# Patient Record
Sex: FEMALE | Race: WHITE | NOT HISPANIC OR LATINO | Employment: OTHER | URBAN - NONMETROPOLITAN AREA
[De-identification: names, ages, dates, MRNs, and addresses within clinical notes are randomized per-mention and may not be internally consistent; named-entity substitution may affect disease eponyms.]

---

## 2017-11-06 ENCOUNTER — TRANSCRIBE ORDERS (OUTPATIENT)
Dept: ADMINISTRATIVE | Facility: HOSPITAL | Age: 77
End: 2017-11-06

## 2017-11-06 DIAGNOSIS — N64.4 BREAST PAIN: ICD-10-CM

## 2017-11-06 DIAGNOSIS — IMO0002 MASS: Primary | ICD-10-CM

## 2017-11-08 ENCOUNTER — HOSPITAL ENCOUNTER (OUTPATIENT)
Dept: MAMMOGRAPHY | Facility: HOSPITAL | Age: 77
Discharge: HOME | End: 2017-11-08
Attending: FAMILY MEDICINE | Admitting: FAMILY MEDICINE

## 2017-11-08 ENCOUNTER — HOSPITAL ENCOUNTER (OUTPATIENT)
Dept: ULTRASOUND IMAGING | Facility: HOSPITAL | Age: 77
Discharge: HOME | End: 2017-11-08
Attending: FAMILY MEDICINE

## 2017-11-08 DIAGNOSIS — N64.4 BREAST PAIN: ICD-10-CM

## 2017-11-08 DIAGNOSIS — IMO0002 MASS: ICD-10-CM

## 2017-11-08 PROCEDURE — G0279 TOMOSYNTHESIS, MAMMO: HCPCS

## 2017-11-08 PROCEDURE — 76642 ULTRASOUND BREAST LIMITED: CPT

## 2017-11-08 PROCEDURE — G0204 DX MAMMO INCL CAD BI: HCPCS

## 2017-11-20 ENCOUNTER — OFFICE VISIT (OUTPATIENT)
Dept: GASTROENTEROLOGY | Age: 77
End: 2017-11-20
Payer: MEDICARE

## 2017-11-20 VITALS
OXYGEN SATURATION: 97 % | SYSTOLIC BLOOD PRESSURE: 122 MMHG | BODY MASS INDEX: 29.09 KG/M2 | HEART RATE: 80 BPM | DIASTOLIC BLOOD PRESSURE: 80 MMHG | WEIGHT: 174.6 LBS | HEIGHT: 65 IN

## 2017-11-20 DIAGNOSIS — R10.13 MIDEPIGASTRIC PAIN: ICD-10-CM

## 2017-11-20 DIAGNOSIS — R11.2 NAUSEA AND VOMITING, INTRACTABILITY OF VOMITING NOT SPECIFIED, UNSPECIFIED VOMITING TYPE: ICD-10-CM

## 2017-11-20 DIAGNOSIS — R53.1 GENERALIZED WEAKNESS: ICD-10-CM

## 2017-11-20 DIAGNOSIS — Z87.11 HISTORY OF GASTRIC ULCER: ICD-10-CM

## 2017-11-20 DIAGNOSIS — Z87.19 HISTORY OF ESOPHAGEAL STRICTURE: ICD-10-CM

## 2017-11-20 DIAGNOSIS — R13.19 ESOPHAGEAL DYSPHAGIA: Primary | ICD-10-CM

## 2017-11-20 PROCEDURE — G8427 DOCREV CUR MEDS BY ELIG CLIN: HCPCS | Performed by: NURSE PRACTITIONER

## 2017-11-20 PROCEDURE — 99214 OFFICE O/P EST MOD 30 MIN: CPT | Performed by: NURSE PRACTITIONER

## 2017-11-20 PROCEDURE — 1036F TOBACCO NON-USER: CPT | Performed by: NURSE PRACTITIONER

## 2017-11-20 PROCEDURE — G8419 CALC BMI OUT NRM PARAM NOF/U: HCPCS | Performed by: NURSE PRACTITIONER

## 2017-11-20 PROCEDURE — 1123F ACP DISCUSS/DSCN MKR DOCD: CPT | Performed by: NURSE PRACTITIONER

## 2017-11-20 PROCEDURE — G8482 FLU IMMUNIZE ORDER/ADMIN: HCPCS | Performed by: NURSE PRACTITIONER

## 2017-11-20 PROCEDURE — 1090F PRES/ABSN URINE INCON ASSESS: CPT | Performed by: NURSE PRACTITIONER

## 2017-11-20 PROCEDURE — G8400 PT W/DXA NO RESULTS DOC: HCPCS | Performed by: NURSE PRACTITIONER

## 2017-11-20 PROCEDURE — 4040F PNEUMOC VAC/ADMIN/RCVD: CPT | Performed by: NURSE PRACTITIONER

## 2017-11-20 ASSESSMENT — ENCOUNTER SYMPTOMS
CONSTIPATION: 0
SORE THROAT: 0
ABDOMINAL PAIN: 1
RECTAL PAIN: 0
CHEST TIGHTNESS: 0
DIARRHEA: 0
NAUSEA: 1
COUGH: 0
VOMITING: 1
ABDOMINAL DISTENTION: 0
BLOOD IN STOOL: 0
BACK PAIN: 0
VOICE CHANGE: 0
TROUBLE SWALLOWING: 1
SHORTNESS OF BREATH: 0

## 2017-11-20 NOTE — PROGRESS NOTES
Subjective:      Patient ID: Mat Adam is a 69 y.o. female. HPI  Chief Complaint   Patient presents with    Gastroesophageal Reflux     referred by Dr. More Martínez    Nausea & Vomiting       Patient c/o nausea and vomiting for last 2 months. Occurs daily. Occurs after eating but not every meal. Associated with AUTUMN pain. She also c/o dysphagia. Onset march/april with progressive worsening. She has history of esophageal stricture previously dilated and history of gastric ulcers. She states she feels weak and tired all the time. She has rx for pantoprazole but takes this only prn. States she does not have a lot of heartburn. Family History   Problem Relation Age of Onset    Breast Cancer Mother     Colon Cancer Neg Hx     Colon Polyps Neg Hx     Esophageal Cancer Neg Hx     Liver Disease Neg Hx     Liver Cancer Neg Hx     Rectal Cancer Neg Hx     Stomach Cancer Neg Hx        Past Medical History:   Diagnosis Date    Arthritis     Fernandes's esophagus     Diabetes ([facility] [address]     Elevated lipids     Hiatal hernia     Osteoarthritis     other     Non Specified Ulcers    Skin cancer     History OF. Past Surgical History:   Procedure Laterality Date    BREAST SURGERY      BG Biopsy    CHOLECYSTECTOMY      COLONOSCOPY  3/16/2005    DR. OTERO    COLONOSCOPY  5/21/2010    tubular adenoma    COLONOSCOPY  12/31/2015    Dr Mcallister Expose, normal, 5 yr recall    FRACTURE SURGERY      x 2 right ankle    HAND SURGERY      HYSTERECTOMY, TOTAL ABDOMINAL      KNEE SURGERY      OTHER SURGICAL HISTORY      Basa Cell Lesion Removal    ROTATOR CUFF REPAIR      X2    UPPER GASTROINTESTINAL ENDOSCOPY  5/19/2005    DR. KIM    UPPER GASTROINTESTINAL ENDOSCOPY  10/22/2009    peptic stricture, schatzki ring dilated 46 fr, sm hiatal hernia, gastritis    UPPER GASTROINTESTINAL ENDOSCOPY  10/2014    empiric dilation, urease neg    UPPER GASTROINTESTINAL ENDOSCOPY N/A 12/18/2015     Analisa, stricture/ulcers, repeat 8 wks       Current Outpatient Prescriptions   Medication Sig Dispense Refill    celecoxib (CELEBREX) 200 MG capsule Take 200 mg by mouth daily.  levothyroxine (SYNTHROID) 75 MCG tablet Take 75 mcg by mouth Daily.  GlipiZIDE (GLUCOTROL PO) Take 10 mg by mouth daily.  MetFORMIN HCl (GLUCOPHAGE PO) Take 1,000 mg by mouth 2 times daily.  LISINOPRIL PO Take 10 mg by mouth daily. No current facility-administered medications for this visit. No Known Allergies     reports that she quit smoking about 28 years ago. She has never used smokeless tobacco. She reports that she drinks alcohol. She reports that she does not use drugs. Review of Systems   Constitutional: Positive for fatigue. Negative for appetite change, fever and unexpected weight change. HENT: Positive for trouble swallowing. Negative for sore throat and voice change. Respiratory: Negative for cough, chest tightness and shortness of breath. Cardiovascular: Negative for chest pain, palpitations and leg swelling. Gastrointestinal: Positive for abdominal pain, nausea and vomiting. Negative for abdominal distention, blood in stool, constipation, diarrhea and rectal pain. Food sticking   Musculoskeletal: Negative for arthralgias, back pain and gait problem. Skin: Negative for pallor, rash and wound. Neurological: Positive for weakness (generalized). Negative for dizziness and light-headedness. Hematological: Negative for adenopathy. Does not bruise/bleed easily. All other systems reviewed and are negative. Objective:   Physical Exam   Constitutional: She is oriented to person, place, and time. She appears well-developed and well-nourished. No distress. /80   Pulse 80   Ht 5' 4.5\" (1.638 m)   Wt 174 lb 9.6 oz (79.2 kg)   SpO2 97%   BMI 29.51 kg/m²      Eyes: Conjunctivae are normal. No scleral icterus. Neck: No tracheal deviation present. 40mg.

## 2017-12-01 ENCOUNTER — ANESTHESIA EVENT (OUTPATIENT)
Dept: OPERATING ROOM | Age: 77
End: 2017-12-01

## 2017-12-01 RX ORDER — SODIUM CHLORIDE, SODIUM LACTATE, POTASSIUM CHLORIDE, CALCIUM CHLORIDE 600; 310; 30; 20 MG/100ML; MG/100ML; MG/100ML; MG/100ML
INJECTION, SOLUTION INTRAVENOUS CONTINUOUS
Status: CANCELLED | OUTPATIENT
Start: 2017-12-01

## 2017-12-01 RX ORDER — LIDOCAINE HYDROCHLORIDE 10 MG/ML
1 INJECTION, SOLUTION EPIDURAL; INFILTRATION; INTRACAUDAL; PERINEURAL
Status: CANCELLED | OUTPATIENT
Start: 2017-12-01 | End: 2017-12-01

## 2017-12-06 ENCOUNTER — HOSPITAL ENCOUNTER (OUTPATIENT)
Age: 77
Setting detail: OUTPATIENT SURGERY
Discharge: HOME | End: 2017-12-06
Attending: INTERNAL MEDICINE | Admitting: INTERNAL MEDICINE
Payer: MEDICARE

## 2017-12-06 ENCOUNTER — HOSPITAL ENCOUNTER (OUTPATIENT)
Age: 77
Setting detail: SPECIMEN
Discharge: HOME | End: 2017-12-06
Payer: MEDICARE

## 2017-12-06 ENCOUNTER — ANESTHESIA (OUTPATIENT)
Dept: OPERATING ROOM | Age: 77
End: 2017-12-06

## 2017-12-06 VITALS
OXYGEN SATURATION: 99 % | TEMPERATURE: 98.6 F | DIASTOLIC BLOOD PRESSURE: 84 MMHG | WEIGHT: 171 LBS | RESPIRATION RATE: 18 BRPM | SYSTOLIC BLOOD PRESSURE: 130 MMHG | BODY MASS INDEX: 29.19 KG/M2 | HEART RATE: 76 BPM | HEIGHT: 64 IN

## 2017-12-06 VITALS — OXYGEN SATURATION: 98 % | SYSTOLIC BLOOD PRESSURE: 155 MMHG | DIASTOLIC BLOOD PRESSURE: 83 MMHG

## 2017-12-06 PROCEDURE — 87077 CULTURE AEROBIC IDENTIFY: CPT

## 2017-12-06 PROCEDURE — G8907 PT DOC NO EVENTS ON DISCHARG: HCPCS | Performed by: NURSE PRACTITIONER

## 2017-12-06 PROCEDURE — 43239 EGD BIOPSY SINGLE/MULTIPLE: CPT

## 2017-12-06 PROCEDURE — G8918 PT W/O PREOP ORDER IV AB PRO: HCPCS | Performed by: NURSE PRACTITIONER

## 2017-12-06 PROCEDURE — 43248 EGD GUIDE WIRE INSERTION: CPT

## 2017-12-06 PROCEDURE — 43248 EGD GUIDE WIRE INSERTION: CPT | Performed by: INTERNAL MEDICINE

## 2017-12-06 PROCEDURE — 43239 EGD BIOPSY SINGLE/MULTIPLE: CPT | Performed by: INTERNAL MEDICINE

## 2017-12-06 RX ORDER — LIDOCAINE HYDROCHLORIDE 10 MG/ML
INJECTION, SOLUTION EPIDURAL; INFILTRATION; INTRACAUDAL; PERINEURAL PRN
Status: DISCONTINUED | OUTPATIENT
Start: 2017-12-06 | End: 2017-12-06 | Stop reason: SDUPTHER

## 2017-12-06 RX ORDER — PROPOFOL 10 MG/ML
INJECTION, EMULSION INTRAVENOUS PRN
Status: DISCONTINUED | OUTPATIENT
Start: 2017-12-06 | End: 2017-12-06 | Stop reason: SDUPTHER

## 2017-12-06 RX ADMIN — PROPOFOL 150 MG: 10 INJECTION, EMULSION INTRAVENOUS at 11:57

## 2017-12-06 RX ADMIN — LIDOCAINE HYDROCHLORIDE 5 ML: 10 INJECTION, SOLUTION EPIDURAL; INFILTRATION; INTRACAUDAL; PERINEURAL at 11:57

## 2017-12-06 NOTE — H&P
[K21.9] 09/16/2014    History of esophageal stricture [Z87.19] 09/16/2014    Esophageal dysphagia [R13.10] 09/16/2014        All other pertinent GI symptoms negative. Physical Exam:  Cardiac:  [x]WNL  []Comments:  Pulmonary:  [x]WNL   []Comments:  Neuro/Mental Status:  [x]WNL  []Comments:  Abdominal:  [x]WNL    []Comments:  Other:   []WNL  []Comments:    Informed Consent:  The risks and benefits of the procedure have been discussed with either the patient or if they cannot consent, their representative. Assessment:  Patient examined and appropriate for planned sedation and procedure. Plan:  Proceed with planned sedation and procedure as above.     Dane Hauser,   11:33 AM  \

## 2017-12-06 NOTE — OP NOTE
will start her on bethanechol 25 mg po bid for her symptoms, then have her f/u in clinic in 4 weeks.     [facility],   12/06/17  12:05 PM

## 2017-12-06 NOTE — ANESTHESIA PRE PROCEDURE
Department of Anesthesiology  Preprocedure Note       Name:  Abigail Gallegos   Age:  69 y.o.  :  1940                                          MRN:  [id number]         Date:  2017      Surgeon: Jez Lou):  Gustavo Hauser DO    Procedure: Procedure(s):  EGD BIOPSY    Medications prior to admission:   Prior to Admission medications    Medication Sig Start Date End Date Taking? Authorizing Provider   celecoxib (CELEBREX) 200 MG capsule Take 200 mg by mouth daily. Historical Provider, MD   levothyroxine (SYNTHROID) 75 MCG tablet Take 75 mcg by mouth Daily. Historical Provider, MD   GlipiZIDE (GLUCOTROL PO) Take 10 mg by mouth daily. Historical Provider, MD   MetFORMIN HCl (GLUCOPHAGE PO) Take 1,000 mg by mouth 2 times daily. Historical Provider, MD   LISINOPRIL PO Take 10 mg by mouth daily. Historical Provider, MD       Current medications:    No current facility-administered medications for this encounter. Allergies:  No Known Allergies    Problem List:    Patient Active Problem List   Diagnosis Code    Esophageal dysphagia R13.10    GERD (gastroesophageal reflux disease) K21.9    History of esophageal stricture Z87.19    History of adenomatous polyp of colon Z86.010    Nausea and vomiting R11.2    Midepigastric pain R10.13    Generalized weakness R53.1    History of gastric ulcer Z87.19       Past Medical History:        Diagnosis Date    Arthritis     Fernandes's esophagus     Diabetes ([address]     Elevated lipids     Hiatal hernia     Osteoarthritis     other     Non Specified Ulcers    Skin cancer     History OF. Past Surgical History:        Procedure Laterality Date    BREAST SURGERY      BG Biopsy    CHOLECYSTECTOMY      COLONOSCOPY  3/16/2005    DR. OTERO    COLONOSCOPY  2010    tubular adenoma    COLONOSCOPY  2015    Dr Low Gravely, normal, 5 yr recall    FRACTURE SURGERY      x 2 right ankle    HAND SURGERY      HYSTERECTOMY, TOTAL ABDOMINAL  KNEE SURGERY      OTHER SURGICAL HISTORY      Basa Cell Lesion Removal    ROTATOR CUFF REPAIR      X2    UPPER GASTROINTESTINAL ENDOSCOPY  5/19/2005    DR. KIM    UPPER GASTROINTESTINAL ENDOSCOPY  10/22/2009    peptic stricture, schatzki ring dilated 46 fr, sm hiatal hernia, gastritis    UPPER GASTROINTESTINAL ENDOSCOPY  10/2014    empiric dilation, urease neg    UPPER GASTROINTESTINAL ENDOSCOPY N/A 12/18/2015    Dr Love Aly, stricture/ulcers, repeat 8 wks       Social History:    Social History   Substance Use Topics    Smoking status: Former Smoker     Quit date: 9/16/1989    Smokeless tobacco: Never Used    Alcohol use Yes      Comment: glass of wine a couple times per month                                Counseling given: Not Answered      Vital Signs (Current): There were no vitals filed for this visit.                                            BP Readings from Last 3 Encounters:   11/20/17 122/80   12/31/15 (!) 136/103   12/31/15 (!) 176/97       NPO Status:                                                                                 BMI:   Wt Readings from Last 3 Encounters:   11/20/17 174 lb 9.6 oz (79.2 kg)   12/31/15 178 lb (80.7 kg)   12/18/15 182 lb (82.6 kg)     There is no height or weight on file to calculate BMI.    CBC:   Lab Results   Component Value Date    WBC 7.33 02/25/2014    RBC 4.09 02/25/2014    HGB 10.9 02/25/2014    HCT 33.4 02/25/2014    MCV 81.7 02/25/2014    RDW 12.7 02/25/2014     02/25/2014       CMP:   Lab Results   Component Value Date     02/25/2014    K 4.2 02/25/2014     02/25/2014    CO2 22 02/25/2014    BUN 9 02/25/2014    CREATININE 1.2 12/03/2014    LABGLOM 65 02/25/2014    GLUCOSE 110 02/25/2014    PROT 6.0 02/25/2014    CALCIUM 8.8 02/25/2014    ALKPHOS 152 02/25/2014    AST 12 02/25/2014    ALT 25 02/25/2014       POC Tests: No results for input(s): POCGLU, POCNA, POCK, POCCL, POCBUN, POCHEMO, POCHCT in the last 72 hours.    Coags: No results found for: PROTIME, INR, APTT    HCG (If Applicable): No results found for: PREGTESTUR, PREGSERUM, HCG, HCGQUANT     ABGs: No results found for: PHART, PO2ART, FPZ3NJN, MLR5EIC, BEART, W1ALKHJW     Type & Screen (If Applicable):  No results found for: LABABO, LABRH    Anesthesia Evaluation   no history of anesthetic complications:   Airway: Mallampati: II  TM distance: >3 FB   Neck ROM: full  Mouth opening: > = 3 FB Dental: normal exam         Pulmonary:Negative Pulmonary ROS and normal exam                               Cardiovascular:Negative CV ROS                      Neuro/Psych:   Negative Neuro/Psych ROS              GI/Hepatic/Renal:   (+) hiatal hernia, GERD: poorly controlled,           Endo/Other:    (+) Type II DM, well controlled, , hypothyroidism::., .          Pt had no PAT visit       Abdominal:           Vascular: negative vascular ROS. Anesthesia Plan      general     ASA 3       Induction: intravenous. Anesthetic plan and risks discussed with patient.                       Ela Jackson CRNA   12/6/2017

## 2017-12-06 NOTE — ANESTHESIA POSTPROCEDURE EVALUATION
Department of Anesthesiology  Postprocedure Note    Patient: Denys Power  MRN: [id number]  YOB: 1940  Date of evaluation: 12/6/2017  Time:  12:06 PM     Procedure Summary     Date:  12/06/17 Room / Location:  [facility] ENDO 01 / [facility] OR    Anesthesia Start:  1156 Anesthesia Stop:      Procedures:       EGD BIOPSY (N/A )      EGD DILATION SAVORY Diagnosis:  (N/V - AUTUMN PAIN - DYSPHAGIA - HX ESOPH STRICTURE - HX GASTRIC ULCERS)    Surgeon:  Dinah Colindres DO Responsible Provider:  Conner Ramírez CRNA    Anesthesia Type:  general ASA Status:  3          Anesthesia Type: general    Alpesh Phase I:      Alpesh Phase II:      Last vitals: Reviewed and per EMR flowsheets.        Anesthesia Post Evaluation    Patient location during evaluation: bedside  Patient participation: complete - patient participated  Level of consciousness: sleepy but conscious  Pain score: 0  Airway patency: patent  Nausea & Vomiting: no nausea and no vomiting  Complications: no  Cardiovascular status: hemodynamically stable and blood pressure returned to baseline  Respiratory status: acceptable and nasal cannula  Hydration status: stable

## 2017-12-07 LAB — CLOTEST: NEGATIVE

## 2017-12-26 ENCOUNTER — OFFICE VISIT (OUTPATIENT)
Dept: GASTROENTEROLOGY | Age: 77
End: 2017-12-26
Payer: MEDICARE

## 2017-12-26 VITALS
SYSTOLIC BLOOD PRESSURE: 140 MMHG | DIASTOLIC BLOOD PRESSURE: 75 MMHG | BODY MASS INDEX: 30.35 KG/M2 | WEIGHT: 177.8 LBS | HEIGHT: 64 IN

## 2017-12-26 DIAGNOSIS — Z87.19 HISTORY OF ESOPHAGEAL STRICTURE: ICD-10-CM

## 2017-12-26 DIAGNOSIS — K21.9 CHRONIC GERD: Primary | ICD-10-CM

## 2017-12-26 DIAGNOSIS — Z87.19 S/P DILATATION OF ESOPHAGEAL STRICTURE: ICD-10-CM

## 2017-12-26 DIAGNOSIS — Z98.890 S/P DILATATION OF ESOPHAGEAL STRICTURE: ICD-10-CM

## 2017-12-26 PROCEDURE — G8427 DOCREV CUR MEDS BY ELIG CLIN: HCPCS | Performed by: NURSE PRACTITIONER

## 2017-12-26 PROCEDURE — G8417 CALC BMI ABV UP PARAM F/U: HCPCS | Performed by: NURSE PRACTITIONER

## 2017-12-26 PROCEDURE — 1123F ACP DISCUSS/DSCN MKR DOCD: CPT | Performed by: NURSE PRACTITIONER

## 2017-12-26 PROCEDURE — G8482 FLU IMMUNIZE ORDER/ADMIN: HCPCS | Performed by: NURSE PRACTITIONER

## 2017-12-26 PROCEDURE — 1090F PRES/ABSN URINE INCON ASSESS: CPT | Performed by: NURSE PRACTITIONER

## 2017-12-26 PROCEDURE — 99213 OFFICE O/P EST LOW 20 MIN: CPT | Performed by: NURSE PRACTITIONER

## 2017-12-26 PROCEDURE — 1036F TOBACCO NON-USER: CPT | Performed by: NURSE PRACTITIONER

## 2017-12-26 PROCEDURE — G8400 PT W/DXA NO RESULTS DOC: HCPCS | Performed by: NURSE PRACTITIONER

## 2017-12-26 PROCEDURE — 4040F PNEUMOC VAC/ADMIN/RCVD: CPT | Performed by: NURSE PRACTITIONER

## 2017-12-26 ASSESSMENT — ENCOUNTER SYMPTOMS
CONSTIPATION: 0
VOICE CHANGE: 0
ABDOMINAL PAIN: 0
CHOKING: 0
NAUSEA: 0
SHORTNESS OF BREATH: 0
TROUBLE SWALLOWING: 0
BLOOD IN STOOL: 0
COUGH: 0
ABDOMINAL DISTENTION: 0
RECTAL PAIN: 0
DIARRHEA: 0
VOMITING: 0

## 2017-12-26 NOTE — PROGRESS NOTES
Subjective:      Patient ID: Omid Rodriguez is a 69 y.o. female. PCP: Avery Stevenson DO     HPI  Chief Complaint   Patient presents with    Follow-up     4 week from endoscopy    Gastroesophageal Reflux       Patient seen in f/u from recent egd with dilation of esophageal stricture with 51 fr dilator and gastric biopsies. Biopsy neg h-pylori. She is swallowing better, no recent problems. She states she had sore throat after procedure lasting about 3 days. Muscles in neck felt tender and sore. She did not get rx for bethanechol. Denies any problem with nausea currently. Past Medical History:   Diagnosis Date    Arthritis     Fernandes's esophagus     Diabetes (HCC)     Elevated lipids     GERD (gastroesophageal reflux disease)     Hiatal hernia     Osteoarthritis     other     Non Specified Ulcers    Skin cancer     History OF. Past Surgical History:   Procedure Laterality Date    BREAST SURGERY      BG Biopsy    CHOLECYSTECTOMY      COLONOSCOPY  3/16/2005    DR. OTERO    COLONOSCOPY  5/21/2010    tubular adenoma    COLONOSCOPY  12/31/2015    Dr Anuradha Patterson, normal, 5 yr recall    FRACTURE SURGERY      x 2 right ankle    HAND SURGERY      HYSTERECTOMY, TOTAL ABDOMINAL      KNEE SURGERY      OTHER SURGICAL HISTORY      Basa Cell Lesion Removal    [place] EGD TRANSORAL BIOPSY SINGLE/MULTIPLE N/A 12/6/2017    EGD BIOPSY performed by Elicia Hauser DO at [address] GASTROINTESTINAL ENDOSCOPY  5/19/2005    DR. KIM    UPPER GASTROINTESTINAL ENDOSCOPY  10/22/2009    peptic stricture, schatzki ring dilated 46 fr, sm hiatal hernia, gastritis    UPPER GASTROINTESTINAL ENDOSCOPY  10/2014    empiric dilation, urease neg    UPPER GASTROINTESTINAL ENDOSCOPY N/A 12/18/2015    Dr Anuradha Patterson, stricture/ulcers, repeat 8 wks    UPPER GASTROINTESTINAL ENDOSCOPY  12/06/2017    Dr Hauser-w/dilation over wire, 46 [place]-hiatal hernia, distal

## 2018-05-09 ENCOUNTER — HOSPITAL ENCOUNTER (OUTPATIENT)
Dept: NUCLEAR MEDICINE | Age: 78
Discharge: HOME | End: 2018-05-11
Payer: MEDICARE

## 2018-05-09 DIAGNOSIS — R11.2 NAUSEA AND VOMITING, INTRACTABILITY OF VOMITING NOT SPECIFIED, UNSPECIFIED VOMITING TYPE: ICD-10-CM

## 2018-05-09 PROCEDURE — 78264 GASTRIC EMPTYING IMG STUDY: CPT

## 2018-05-09 PROCEDURE — 3430000000 HC RX DIAGNOSTIC RADIOPHARMACEUTICAL: Performed by: FAMILY MEDICINE

## 2018-05-09 PROCEDURE — A9541 TC99M SULFUR COLLOID: HCPCS | Performed by: FAMILY MEDICINE

## 2018-05-09 RX ADMIN — Medication 2 MILLICURIE: at 09:16

## 2018-05-21 ENCOUNTER — TRANSCRIBE ORDERS (OUTPATIENT)
Dept: CARDIOLOGY | Facility: HOSPITAL | Age: 78
End: 2018-05-21

## 2018-05-21 ENCOUNTER — HOSPITAL ENCOUNTER (OUTPATIENT)
Dept: CARDIOLOGY | Facility: HOSPITAL | Age: 78
Discharge: HOME | End: 2018-05-21
Admitting: PHYSICIAN ASSISTANT

## 2018-05-21 DIAGNOSIS — R53.83 OTHER FATIGUE: ICD-10-CM

## 2018-05-21 DIAGNOSIS — R11.2 NAUSEA AND VOMITING, INTRACTABILITY OF VOMITING NOT SPECIFIED, UNSPECIFIED VOMITING TYPE: Primary | ICD-10-CM

## 2018-05-21 DIAGNOSIS — E11.65 TYPE 2 DIABETES MELLITUS WITH HYPERGLYCEMIA, UNSPECIFIED LONG TERM INSULIN USE STATUS: ICD-10-CM

## 2018-05-21 DIAGNOSIS — E03.9 HYPOTHYROIDISM, UNSPECIFIED TYPE: ICD-10-CM

## 2018-05-21 DIAGNOSIS — R10.10 UPPER ABDOMINAL PAIN: ICD-10-CM

## 2018-05-21 PROCEDURE — 93005 ELECTROCARDIOGRAM TRACING: CPT

## 2018-05-21 PROCEDURE — 93010 ELECTROCARDIOGRAM REPORT: CPT | Performed by: INTERNAL MEDICINE

## 2018-05-22 ENCOUNTER — TRANSCRIBE ORDERS (OUTPATIENT)
Dept: ADMINISTRATIVE | Facility: HOSPITAL | Age: 78
End: 2018-05-22

## 2018-05-22 DIAGNOSIS — K85.90 PANCREATITIS, UNSPECIFIED PANCREATITIS TYPE: Primary | ICD-10-CM

## 2018-05-22 DIAGNOSIS — R11.2 NAUSEA AND VOMITING, INTRACTABILITY OF VOMITING NOT SPECIFIED, UNSPECIFIED VOMITING TYPE: ICD-10-CM

## 2018-05-23 ENCOUNTER — HOSPITAL ENCOUNTER (OUTPATIENT)
Dept: CT IMAGING | Facility: HOSPITAL | Age: 78
Discharge: HOME | End: 2018-05-23
Attending: FAMILY MEDICINE | Admitting: FAMILY MEDICINE

## 2018-05-23 DIAGNOSIS — K85.90 PANCREATITIS, UNSPECIFIED PANCREATITIS TYPE: ICD-10-CM

## 2018-05-23 DIAGNOSIS — R11.2 NAUSEA AND VOMITING, INTRACTABILITY OF VOMITING NOT SPECIFIED, UNSPECIFIED VOMITING TYPE: ICD-10-CM

## 2018-05-23 LAB — CREAT BLDA-MCNC: 1.2 MG/DL (ref 0.6–1.3)

## 2018-05-23 PROCEDURE — 74177 CT ABD & PELVIS W/CONTRAST: CPT

## 2018-05-23 PROCEDURE — 0 IOPAMIDOL PER 1 ML: Performed by: FAMILY MEDICINE

## 2018-05-23 PROCEDURE — 82565 ASSAY OF CREATININE: CPT

## 2018-05-23 RX ADMIN — IOPAMIDOL 100 ML: 755 INJECTION, SOLUTION INTRAVENOUS at 08:00

## 2018-06-14 RX ORDER — BUPROPION HYDROCHLORIDE 150 MG/1
150 TABLET ORAL EVERY MORNING
COMMUNITY
End: 2021-12-10

## 2018-06-14 RX ORDER — ROSUVASTATIN CALCIUM 20 MG/1
20 TABLET, COATED ORAL DAILY
Status: ON HOLD | COMMUNITY
End: 2021-07-25

## 2018-07-02 ENCOUNTER — OFFICE VISIT (OUTPATIENT)
Dept: GASTROENTEROLOGY | Age: 78
End: 2018-07-02
Payer: MEDICARE

## 2018-07-02 VITALS
HEIGHT: 65 IN | DIASTOLIC BLOOD PRESSURE: 80 MMHG | WEIGHT: 167.4 LBS | BODY MASS INDEX: 27.89 KG/M2 | HEART RATE: 73 BPM | SYSTOLIC BLOOD PRESSURE: 130 MMHG | OXYGEN SATURATION: 96 %

## 2018-07-02 DIAGNOSIS — R10.13 EPIGASTRIC ABDOMINAL PAIN: Primary | ICD-10-CM

## 2018-07-02 DIAGNOSIS — R11.2 NAUSEA AND VOMITING, INTRACTABILITY OF VOMITING NOT SPECIFIED, UNSPECIFIED VOMITING TYPE: ICD-10-CM

## 2018-07-02 DIAGNOSIS — R74.8 ABNORMAL SERUM LEVEL OF LIPASE: ICD-10-CM

## 2018-07-02 DIAGNOSIS — R10.13 EPIGASTRIC ABDOMINAL PAIN: ICD-10-CM

## 2018-07-02 LAB
ALBUMIN SERPL-MCNC: 4.5 G/DL (ref 3.5–5.2)
ALP BLD-CCNC: 97 U/L (ref 35–104)
ALT SERPL-CCNC: 12 U/L (ref 5–33)
ANION GAP SERPL CALCULATED.3IONS-SCNC: 16 MMOL/L (ref 7–19)
AST SERPL-CCNC: 14 U/L (ref 5–32)
BILIRUB SERPL-MCNC: 0.9 MG/DL (ref 0.2–1.2)
BUN BLDV-MCNC: 16 MG/DL (ref 8–23)
CALCIUM SERPL-MCNC: 9.8 MG/DL (ref 8.8–10.2)
CHLORIDE BLD-SCNC: 99 MMOL/L (ref 98–111)
CO2: 23 MMOL/L (ref 22–29)
CREAT SERPL-MCNC: 1.2 MG/DL (ref 0.5–0.9)
GFR NON-AFRICAN AMERICAN: 44
GLUCOSE BLD-MCNC: 130 MG/DL (ref 74–109)
LIPASE: 73 U/L (ref 13–60)
POTASSIUM SERPL-SCNC: 4.4 MMOL/L (ref 3.5–5)
SODIUM BLD-SCNC: 138 MMOL/L (ref 136–145)
TOTAL PROTEIN: 7.3 G/DL (ref 6.6–8.7)

## 2018-07-02 PROCEDURE — 99205 OFFICE O/P NEW HI 60 MIN: CPT | Performed by: INTERNAL MEDICINE

## 2018-07-02 PROCEDURE — 1036F TOBACCO NON-USER: CPT | Performed by: INTERNAL MEDICINE

## 2018-07-02 PROCEDURE — 4040F PNEUMOC VAC/ADMIN/RCVD: CPT | Performed by: INTERNAL MEDICINE

## 2018-07-02 PROCEDURE — 1123F ACP DISCUSS/DSCN MKR DOCD: CPT | Performed by: INTERNAL MEDICINE

## 2018-07-02 PROCEDURE — 1101F PT FALLS ASSESS-DOCD LE1/YR: CPT | Performed by: INTERNAL MEDICINE

## 2018-07-02 PROCEDURE — G8400 PT W/DXA NO RESULTS DOC: HCPCS | Performed by: INTERNAL MEDICINE

## 2018-07-02 PROCEDURE — 1090F PRES/ABSN URINE INCON ASSESS: CPT | Performed by: INTERNAL MEDICINE

## 2018-07-02 PROCEDURE — G8417 CALC BMI ABV UP PARAM F/U: HCPCS | Performed by: INTERNAL MEDICINE

## 2018-07-02 PROCEDURE — G8427 DOCREV CUR MEDS BY ELIG CLIN: HCPCS | Performed by: INTERNAL MEDICINE

## 2018-07-02 RX ORDER — CELECOXIB 200 MG/1
200 CAPSULE ORAL DAILY
Status: ON HOLD | COMMUNITY
End: 2018-08-15 | Stop reason: HOSPADM

## 2018-07-02 NOTE — PROGRESS NOTES
Fidenciodara Navarro  Primary Care Provider Danae Laguna DO  Referral Source Omid Vinson,*    Dorina Navarro is a 69 y.o. female  Chief Complaint:  Chief Complaint   Patient presents with    Nausea & Vomiting    Discuss Labs       Assessment / Plan:   Diagnosis Orders   1. Epigastric abdominal pain     2. Nausea and vomiting, intractability of vomiting not specified, unspecified vomiting type     3. Abnormal serum level of lipase       Given constellation of abdominal complaints, biliary dilatation, and questionable history of \"sand in her gallbladder\" I discussed the idea of an MRI MRCP versus EUS. This in combination with the findings of questionable thickening on her esophagus we decided to pursue an EGD with endoscopic ultrasound.     -Schedule EGD with EUS  - Recheck CMP, Lipase today    I have discussed the benefits, alternatives, and risks (including bleeding, perforation and death)  for pursuing Endoscopy (EGD/Colonscopy/EUS/ERCP) with the patient and they are willing to continue. We also discussed the need for anesthesia, IV access, proper dietary changes, medication changes if necessary, and need for bowel prep (if ordered) prior to their Endoscopic procedure. They are aware they must have someone accompany them to their scheduled procedure to drive them home - they agree to the above and are willing to continue. HPI    Very pleasant 76-year-old female referred by Dr. Rubina Torres with recurrent nausea vomiting elevated lipase. There is evidence of an outpatient CT scan from May shows some question of esophageal wall thickening and a small hiatal hernia. There is a mild dilation of common bile duct. Her pancreas is unremarkable    She has some continued nausea and vomiting. This is nonbloody. She's never been jaundice. Her weight is stable. She states her nausea vomiting has been present since October. She does go to admission [place] for the winter.  She December type pain there for 3 days per week. Occasional feelings of food in stomach acid refluxing into her esophagus. This is worse with certain foods. She states her gallbladder was removed \"a long time ago\" due to recurrent abdominal attacks. She said she had \"sand filling the gallbladder\" this was an open cholecystectomy by Dr. Weir Prim note she had a mild elevation in her lipase to 707. Her amylase was slightly elevated 190. Radiology Review:  CT of the abdomen with contrast from May 2018 shows a common bile duct and outpatient likely from previous cholecystectomy. Is a questionable distal esophageal thickening. Pancreas appears normal. This report personally reviewed by me in her care everywhere    Labwork available on referral cleanse a normal CBC and CMP. Specifically her LFTs are all normal. Lipase and amylase were elevated as described above this was in May 2018    No personal or family history of hepatobiliary disease  She does occasionally drink wine. Denies tobacco use     Past Medical History:   Diagnosis Date    Arthritis     Fernandes's esophagus     DDD (degenerative disc disease), lumbar     DM (diabetes mellitus), type 2 ([address]     Duodenal ulcer     E. coli sepsis ([facility] [address] 02/2014    Elevated lipids     Elevated pancreatic enzyme     Gastritis     GERD (gastroesophageal reflux disease)     Hiatal hernia     History of colon polyps     HTN (hypertension)     Hypercholesterolemia     Hypothyroidism     Nausea and vomiting     Osteoarthritis     other     Non Specified Ulcers    Pernicious anemia     Skin cancer     History OF.  Venous angioma     left-parietal      Past Surgical History:   Procedure Laterality Date    BREAST SURGERY      BG Biopsy    CHOLECYSTECTOMY      COLONOSCOPY  3/16/2005    DR. OTERO    COLONOSCOPY  5/21/2010    tubular adenoma    COLONOSCOPY  12/31/2015    Dr Sandro Ponce, normal, 5 yr recall    FRACTURE SURGERY      x 2 right ankle    HAND SURGERY Right 07/2012    Dr Leidy Urbina removed due to arthritis and tendon repair    HYSTERECTOMY, TOTAL ABDOMINAL  1969    KNEE SURGERY Right     LUMBAR [address] SURGERY  11/2008    L5-S1-Dr Demetrice Centeno LUMBAR SPINE SURGERY  2005    OTHER SURGICAL HISTORY Right     Basa Cell Lesion Removal-elbow    OTHER SURGICAL HISTORY Right 2002    ORIF ankle    [place] EGD TRANSORAL BIOPSY SINGLE/MULTIPLE N/A 12/6/2017    EGD BIOPSY performed by Lynette Stanford DO at [facility] Right     ROTATOR CUFF REPAIR Left 12/2013    Dr Galeana Bras  5/19/2005    DR. KIM    UPPER GASTROINTESTINAL ENDOSCOPY  10/22/2009    peptic stricture, schatzki ring dilated 46 fr, sm hiatal hernia, gastritis    UPPER GASTROINTESTINAL ENDOSCOPY  10/01/2014    Dr Hauser-empiric dilation, urease neg    UPPER GASTROINTESTINAL ENDOSCOPY N/A 12/18/2015    Dr India Weber, stricture/ulcers, repeat 8 wks    UPPER GASTROINTESTINAL ENDOSCOPY  12/06/2017    Dr Hauser-w/dilation over wire, 46 [place]-hiatal hernia, distal esophageal stricture    UPPER GASTROINTESTINAL ENDOSCOPY  12/6/2017    EGD DILATION SAVORY performed by Lynette Stanford DO at [facility] OR      Family History   Problem Relation Age of Onset    Breast Cancer Mother     Colon Cancer Neg Hx     Colon Polyps Neg Hx     Esophageal Cancer Neg Hx     Liver Disease Neg Hx     Liver Cancer Neg Hx     Rectal Cancer Neg Hx     Stomach Cancer Neg Hx       Current Outpatient Prescriptions   Medication Sig Dispense Refill    celecoxib (CELEBREX) 200 MG capsule Take 200 mg by mouth daily      rosuvastatin (CRESTOR) 20 MG tablet Take 20 mg by mouth daily      buPROPion (WELLBUTRIN XL) 150 MG extended release tablet Take 150 mg by mouth every morning      Pantoprazole Sodium (PROTONIX PO) Take 40 mg by mouth daily       levothyroxine (SYNTHROID) 75 MCG tablet Take 75 mcg by mouth Daily.  GlipiZIDE (GLUCOTROL PO) Take 10 mg by mouth daily. No tracheal deviation present. Cardiovascular: Normal rate, regular rhythm, normal heart sounds and intact distal pulses. Exam reveals no gallop and no friction rub. No murmur heard. Pulmonary/Chest: Effort normal and breath sounds normal. No accessory muscle usage. No respiratory distress. She exhibits no tenderness and no deformity. Abdominal: Soft. Normal appearance and bowel sounds are normal. She exhibits no distension and no mass. There is no hepatomegaly. There is no tenderness. There is no rebound and no guarding. Musculoskeletal: Normal range of motion. She exhibits no edema. Right ankle: She exhibits no swelling. Left ankle: She exhibits no swelling. Lymphadenopathy:     She has no cervical adenopathy. Right: No supraclavicular adenopathy present. Left: No supraclavicular adenopathy present. Neurological: She is alert and oriented to person, place, and time. She has normal strength. Gait normal.   Skin: Skin is warm, dry and intact. No bruising, no lesion and no rash noted. No cyanosis. No pallor. Nails show no clubbing. Psychiatric: She has a normal mood and affect. Her speech is normal and behavior is normal. Cognition and memory are normal.       Labs:    Multiple Labs reviewed under Care Everywhere and in referral packet from Dr Saldivar & Jo-Ann office. Pertinent findings listed above in HPI    No visits with results within 1 Month(s) from this visit.    Latest known visit with results is:   Hospital Outpatient Visit on 12/06/2017   Component Date Value Ref Range Status    Clotest 12/06/2017 Negative  Negative Final

## 2018-08-15 ENCOUNTER — ANESTHESIA EVENT (OUTPATIENT)
Dept: ENDOSCOPY | Age: 78
End: 2018-08-15
Payer: MEDICARE

## 2018-08-15 ENCOUNTER — ANESTHESIA (OUTPATIENT)
Dept: ENDOSCOPY | Age: 78
End: 2018-08-15
Payer: MEDICARE

## 2018-08-15 ENCOUNTER — HOSPITAL ENCOUNTER (OUTPATIENT)
Age: 78
Setting detail: OUTPATIENT SURGERY
Discharge: HOME | End: 2018-08-15
Attending: INTERNAL MEDICINE | Admitting: INTERNAL MEDICINE
Payer: MEDICARE

## 2018-08-15 VITALS
SYSTOLIC BLOOD PRESSURE: 129 MMHG | DIASTOLIC BLOOD PRESSURE: 65 MMHG | HEIGHT: 65 IN | HEART RATE: 76 BPM | BODY MASS INDEX: 27.16 KG/M2 | RESPIRATION RATE: 14 BRPM | TEMPERATURE: 97.3 F | OXYGEN SATURATION: 99 % | WEIGHT: 163 LBS

## 2018-08-15 VITALS
OXYGEN SATURATION: 93 % | DIASTOLIC BLOOD PRESSURE: 79 MMHG | SYSTOLIC BLOOD PRESSURE: 116 MMHG | RESPIRATION RATE: 14 BRPM

## 2018-08-15 LAB
GLUCOSE BLD-MCNC: 160 MG/DL (ref 70–99)
PERFORMED ON: ABNORMAL

## 2018-08-15 PROCEDURE — 43259 EGD US EXAM DUODENUM/JEJUNUM: CPT | Performed by: INTERNAL MEDICINE

## 2018-08-15 PROCEDURE — 6360000002 HC RX W HCPCS: Performed by: NURSE ANESTHETIST, CERTIFIED REGISTERED

## 2018-08-15 PROCEDURE — 2709999900 HC NON-CHARGEABLE SUPPLY: Performed by: INTERNAL MEDICINE

## 2018-08-15 PROCEDURE — 2580000003 HC RX 258: Performed by: INTERNAL MEDICINE

## 2018-08-15 PROCEDURE — 3609018500 HC EGD US SCOPE W/ADJACENT STRUCTURES: Performed by: INTERNAL MEDICINE

## 2018-08-15 PROCEDURE — 82948 REAGENT STRIP/BLOOD GLUCOSE: CPT

## 2018-08-15 PROCEDURE — C1769 GUIDE WIRE: HCPCS | Performed by: INTERNAL MEDICINE

## 2018-08-15 PROCEDURE — 3609012400 HC EGD TRANSORAL BIOPSY SINGLE/MULTIPLE: Performed by: INTERNAL MEDICINE

## 2018-08-15 PROCEDURE — 43239 EGD BIOPSY SINGLE/MULTIPLE: CPT | Performed by: INTERNAL MEDICINE

## 2018-08-15 PROCEDURE — 7100000010 HC PHASE II RECOVERY - FIRST 15 MIN: Performed by: INTERNAL MEDICINE

## 2018-08-15 PROCEDURE — 3609012700 HC EGD DILATION SAVORY: Performed by: INTERNAL MEDICINE

## 2018-08-15 PROCEDURE — 2500000003 HC RX 250 WO HCPCS: Performed by: INTERNAL MEDICINE

## 2018-08-15 PROCEDURE — 7100000011 HC PHASE II RECOVERY - ADDTL 15 MIN: Performed by: INTERNAL MEDICINE

## 2018-08-15 PROCEDURE — 43248 EGD GUIDE WIRE INSERTION: CPT | Performed by: INTERNAL MEDICINE

## 2018-08-15 PROCEDURE — 2500000003 HC RX 250 WO HCPCS: Performed by: NURSE ANESTHETIST, CERTIFIED REGISTERED

## 2018-08-15 PROCEDURE — 3700000000 HC ANESTHESIA ATTENDED CARE: Performed by: INTERNAL MEDICINE

## 2018-08-15 PROCEDURE — 88312 SPECIAL STAINS GROUP 1: CPT

## 2018-08-15 PROCEDURE — 88305 TISSUE EXAM BY PATHOLOGIST: CPT

## 2018-08-15 PROCEDURE — 3700000001 HC ADD 15 MINUTES (ANESTHESIA): Performed by: INTERNAL MEDICINE

## 2018-08-15 RX ORDER — PROPOFOL 10 MG/ML
INJECTION, EMULSION INTRAVENOUS CONTINUOUS PRN
Status: DISCONTINUED | OUTPATIENT
Start: 2018-08-15 | End: 2018-08-15 | Stop reason: SDUPTHER

## 2018-08-15 RX ORDER — LIDOCAINE HYDROCHLORIDE 20 MG/ML
INJECTION, SOLUTION INFILTRATION; PERINEURAL PRN
Status: DISCONTINUED | OUTPATIENT
Start: 2018-08-15 | End: 2018-08-15 | Stop reason: SDUPTHER

## 2018-08-15 RX ORDER — FENTANYL CITRATE 50 UG/ML
INJECTION, SOLUTION INTRAMUSCULAR; INTRAVENOUS PRN
Status: DISCONTINUED | OUTPATIENT
Start: 2018-08-15 | End: 2018-08-15 | Stop reason: SDUPTHER

## 2018-08-15 RX ORDER — SUCRALFATE 1 G/1
1 TABLET ORAL 4 TIMES DAILY
Qty: 120 TABLET | Refills: 3 | Status: ON HOLD | OUTPATIENT
Start: 2018-08-15 | End: 2021-07-25

## 2018-08-15 RX ORDER — SODIUM CHLORIDE, SODIUM LACTATE, POTASSIUM CHLORIDE, CALCIUM CHLORIDE 600; 310; 30; 20 MG/100ML; MG/100ML; MG/100ML; MG/100ML
INJECTION, SOLUTION INTRAVENOUS CONTINUOUS
Status: DISCONTINUED | OUTPATIENT
Start: 2018-08-15 | End: 2018-08-15 | Stop reason: HOSPADM

## 2018-08-15 RX ORDER — PANTOPRAZOLE SODIUM 40 MG/1
40 TABLET, DELAYED RELEASE ORAL
Qty: 30 TABLET | Refills: 3 | Status: ON HOLD | OUTPATIENT
Start: 2018-08-15 | End: 2021-07-25

## 2018-08-15 RX ORDER — LIDOCAINE HYDROCHLORIDE 10 MG/ML
1 INJECTION, SOLUTION EPIDURAL; INFILTRATION; INTRACAUDAL; PERINEURAL ONCE
Status: COMPLETED | OUTPATIENT
Start: 2018-08-15 | End: 2018-08-15

## 2018-08-15 RX ORDER — SUCRALFATE ORAL 1 G/10ML
1 SUSPENSION ORAL 4 TIMES DAILY
Qty: 1200 ML | Refills: 3 | Status: SHIPPED | OUTPATIENT
Start: 2018-08-15 | End: 2018-08-15

## 2018-08-15 RX ORDER — MIDAZOLAM HYDROCHLORIDE 1 MG/ML
INJECTION INTRAMUSCULAR; INTRAVENOUS PRN
Status: DISCONTINUED | OUTPATIENT
Start: 2018-08-15 | End: 2018-08-15 | Stop reason: SDUPTHER

## 2018-08-15 RX ADMIN — PROPOFOL 100 MCG/KG/MIN: 10 INJECTION, EMULSION INTRAVENOUS at 13:24

## 2018-08-15 RX ADMIN — FENTANYL CITRATE 50 MCG: 50 INJECTION INTRAMUSCULAR; INTRAVENOUS at 13:24

## 2018-08-15 RX ADMIN — SODIUM CHLORIDE, POTASSIUM CHLORIDE, SODIUM LACTATE AND CALCIUM CHLORIDE: 600; 310; 30; 20 INJECTION, SOLUTION INTRAVENOUS at 11:24

## 2018-08-15 RX ADMIN — MIDAZOLAM 2 MG: 1 INJECTION INTRAMUSCULAR; INTRAVENOUS at 13:22

## 2018-08-15 RX ADMIN — LIDOCAINE HYDROCHLORIDE 40 MG: 20 INJECTION, SOLUTION INFILTRATION; PERINEURAL at 13:24

## 2018-08-15 RX ADMIN — LIDOCAINE HYDROCHLORIDE 1 ML: 10 INJECTION, SOLUTION EPIDURAL; INFILTRATION; INTRACAUDAL; PERINEURAL at 11:24

## 2018-08-15 NOTE — H&P
Patient Name: Lena Joiner  : 1940  MRN: [id number]  DATE: 08/15/18    Allergies: Allergies   Allergen Reactions    Januvia [Sitagliptin] Nausea And Vomiting        ENDOSCOPY  History and Physical    Procedure:    [] Diagnostic Colonoscopy       [] Screening Colonoscopy  [x] EGD      [] ERCP      [x] EUS       [] Other    [x] Previous office notes/History and Physical reviewed from the patients chart. Please see EMR for further details of HPI. I have examined the patient's status immediately prior to the procedure and:      Indications/HPI:      - nausea/vomiting and abnormal labs    Anesthesia:   [x] MAC [] Moderate Sedation   [] General   [] None     ROS: 12 pt Review of Symptoms was negative unless mentioned above    Medications:   Prior to Admission medications    Medication Sig Start Date End Date Taking? Authorizing Provider   celecoxib (CELEBREX) 200 MG capsule Take 200 mg by mouth daily   Yes Historical Provider, MD   buPROPion (WELLBUTRIN XL) 150 MG extended release tablet Take 150 mg by mouth every morning   Yes Historical Provider, MD   levothyroxine (SYNTHROID) 75 MCG tablet Take 75 mcg by mouth Daily. Yes Historical Provider, MD   GlipiZIDE (GLUCOTROL PO) Take 10 mg by mouth daily. Yes Historical Provider, MD   MetFORMIN HCl (GLUCOPHAGE PO) Take 1,000 mg by mouth 2 times daily. Yes Historical Provider, MD   LISINOPRIL PO Take 10 mg by mouth daily.    Yes Historical Provider, MD   rosuvastatin (CRESTOR) 20 MG tablet Take 20 mg by mouth daily    Historical Provider, MD   Pantoprazole Sodium (PROTONIX PO) Take 40 mg by mouth daily     Historical Provider, MD       Past Medical History:  Past Medical History:   Diagnosis Date    Arthritis     Fernandes's esophagus     DDD (degenerative disc disease), lumbar     DM (diabetes mellitus), type 2 ([facility] [address]     Duodenal ulcer     E. coli sepsis ([facility] 2014    Elevated lipids     Elevated pancreatic enzyme     Gastritis     GERD (gastroesophageal reflux disease)     Hiatal hernia     History of colon polyps     HTN (hypertension)     Hypercholesterolemia     Hypothyroidism     Nausea and vomiting     Osteoarthritis     other     Non Specified Ulcers    Pernicious anemia     Skin cancer     History OF.  Venous angioma     left-parietal       Past Surgical History:  Past Surgical History:   Procedure Laterality Date    BREAST SURGERY      BG Biopsy    CHOLECYSTECTOMY      COLONOSCOPY  3/16/2005    DR. OTERO    COLONOSCOPY  5/21/2010    tubular adenoma    COLONOSCOPY  12/31/2015    Dr Leobardo Baez, normal, 5 yr recall    FRACTURE SURGERY      x 2 right ankle    HAND SURGERY Right 07/2012    Dr Jean Gannon removed due to arthritis and tendon repair    HYSTERECTOMY, TOTAL ABDOMINAL  1969    KNEE SURGERY Right     LUMBAR One [address] SURGERY  11/2008    L5-S1-Dr Lorin Dominguez LUMBAR SPINE SURGERY  2005    OTHER SURGICAL HISTORY Right     Basa Cell Lesion Removal-elbow    OTHER SURGICAL HISTORY Right 2002    ORIF ankle    CA EGD TRANSORAL BIOPSY SINGLE/MULTIPLE N/A 12/6/2017    EGD BIOPSY performed by Tobias Torres DO at [facility] Right     ROTATOR CUFF REPAIR Left 12/2013    Dr Pena Axon  5/19/2005    DR. KIM    UPPER GASTROINTESTINAL ENDOSCOPY  10/22/2009    peptic stricture, schatzki ring dilated 46 fr, sm hiatal hernia, gastritis    UPPER GASTROINTESTINAL ENDOSCOPY  10/01/2014    Dr Hauser-empiric dilation, urease neg    UPPER GASTROINTESTINAL ENDOSCOPY N/A 12/18/2015    Dr Leobardo Baez, stricture/ulcers, repeat 8 wks    UPPER GASTROINTESTINAL ENDOSCOPY  12/06/2017    Dr Hauser-w/dilation over wire, 46 [place]-hiatal hernia, distal esophageal stricture    UPPER GASTROINTESTINAL ENDOSCOPY  12/6/2017    EGD DILATION SAVORY performed by Tobias Torres DO at [facility] History:  Social History   Substance Use Topics    Smoking status: Former Smoker     Packs/day: 0.50     Years: 10.00     Types: Cigarettes     Quit date: 9/16/1989    Smokeless tobacco: Never Used    Alcohol use Yes      Comment: glass of wine a couple times per month       Vital Signs:   Vitals:    08/15/18 1109   BP: 126/64   Pulse: 72   Resp: 18   Temp: 97.6 °F (36.4 °C)   SpO2: 98%        Physical Exam:  Cardiac:  [x]WNL  []Comments:  Pulmonary:  [x]WNL   []Comments:  Neuro/Mental Status:  [x]WNL  []Comments:  Abdominal:  [x]WNL    []Comments:  Other:   []WNL  []Comments:    Informed Consent:  The risks and benefits of the procedure have been discussed with either the patient or if they cannot consent, their representative. Assessment:  Patient examined and appropriate for planned sedation and procedure. Plan:  Proceed with planned sedation and procedure as above.     Tricia Pope MD

## 2018-08-15 NOTE — ANESTHESIA POSTPROCEDURE EVALUATION
Department of Anesthesiology  Postprocedure Note    Patient: Krystle Veliz  MRN: [id number]  YOB: 1940  Date of evaluation: 8/15/2018  Time:  2:10 PM     Procedure Summary     Date:  08/15/18 Room / Location:  [facility] ENDO 10 / [facility]    Anesthesia Start:  3095 Anesthesia Stop:      Procedures:       EGD ESOPHAGOGASTRODUODENOSCOPY ULTRASOUND (N/A )      EGD BIOPSY (Abdomen)      EGD DILATION SAVORY Diagnosis:  (PANCREATITIS - ABD PAIN)    Surgeon:  Jorge James MD Responsible Provider:  MARTY Thorne CRNA    Anesthesia Type:  general ASA Status:  3          Anesthesia Type: general    Alpesh Phase I: Alpesh Score: 10    Alpesh Phase II:      Last vitals: Reviewed and per EMR flowsheets.        Anesthesia Post Evaluation    Patient location during evaluation: bedside  Patient participation: complete - patient participated  Level of consciousness: sleepy but conscious  Pain score: 0  Airway patency: patent  Nausea & Vomiting: no nausea and no vomiting  Complications: no  Cardiovascular status: hemodynamically stable and blood pressure returned to baseline  Respiratory status: acceptable and nasal cannula  Hydration status: stable

## 2018-08-16 NOTE — OP NOTE
[facility] [address] [facility]                                 OPERATIVE REPORT    PATIENT NAME: Jef Franks                    :        1940  MED REC NO:   [id number]                              ROOM:  ACCOUNT NO:   [de-identified]                           ADMIT DATE: 08/15/2018  PROVIDER:     Denise Rubin MD    DATE OF PROCEDURE:  08/15/2018    ATTENDING PHYSICIAN:  Denise Rubin MD    PCP:  Neto Stewart DO    INDICATION FOR PROCEDURE:  A 52-year-old female with recurrent nausea,  vomiting and elevated lipase. She has had an outpatient CT scan of the  abdomen from May, which shows some esophageal wall thickening and small  hiatal hernia, hepatic steatosis, prior cholecystectomy with slightly  dilated common bile duct, unremarkable pancreas. The patient admits to continued nausea and vomiting. No jaundice. No  significant weight loss. PROCEDURE PERFORMED:  1. EGD with biopsy. 2.  EGD with endoscopic ultrasound. 3.  EGD with wire-guided dilation esophagus. ANESTHESIA:  MAC.    COMPLICATIONS:  None. BLOOD LOSS:  No blood loss. DESCRIPTION OF PROCEDURE:  I met with the patient prior to the procedure. We discussed risks, benefits and alternatives. She was agreeable to  continue. She was brought to the endoscopy unit and placed in the left  lateral decubitus position. Initially, a forward-viewing endoscope was  advanced in the oropharynx down to the distal duodenum. ENDOSCOPIC FINDINGS:  1. The distal esophagus was mildly inflamed. There was a 2 to 3 cm hiatal  hernia. There was a questionable stricture in the distal esophagus just  prior to the hernia itself. This had been dilated 54-Turkmen in the past  approximately 8 months ago. 2.  Retroflex view in the stomach revealed a small hiatal hernia. No other  obvious abnormalities.   There was evidence of erythema and mucosal change  involving the was not  significantly dilated. No masses, hyperechoic stranding or findings of  chronic pancreatitis were seen. No cystic lesions were noted in the body  or neck of the pancreas. 5.  The scope was then advanced to duodenal bulb. From here, the common  bile duct was visualized. It was mildly dilated just over a centimeter in  greatest dimension. No obvious filling defects were seen. The main  pancreatic duct did not appear dilated in the head of the pancreas. 6.  Again, the ampulla or the uncinate process cannot be evaluated due to  the significant duodenal stricture. IMPRESSION:  1. 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 2.  Duodenitis. 3.  Gastritis. 4.  Hiatal hernia. 5.  Distal esophageal stricture status post wire-guided dilation 51-Vietnamese. 6.  No significant pancreatic abnormalities appreciated on somewhat limited  upper EUS as described above. RECOMMENDATIONS:  1. Aggressive PPI treatment at 40 mg b.i.d.  2.  Carafate 3 times a day. 3.  Complete NSAID avoidance. The patient is prescribed Celebrex and I  would ask her to stop this. She also needs to avoid all over-the-counter  NSAIDs. Follow up pathology results and treat H. pylori if positive. 4.  I would recommend outpatient cross-sectional imaging of her upper  abdomen. This could possibly be done with an MRI with contrast and MRCP to  rule out any pancreatic or biliary disease in the area of the duodenal  turn. This will be arranged in my office.         Adan Melton MD    D: 08/15/2018 15:19:07      T: 08/15/2018 20:10:18     [id number]  Job#: [id number]     Doc#: [id number]    CC:  Gilma Pickett MD

## 2018-08-20 ASSESSMENT — ENCOUNTER SYMPTOMS
BACK PAIN: 1
TROUBLE SWALLOWING: 0
ABDOMINAL PAIN: 1
VOICE CHANGE: 0
EYES NEGATIVE: 1
NAUSEA: 1
SORE THROAT: 0
RECTAL PAIN: 0
CHEST TIGHTNESS: 0
BLOOD IN STOOL: 0
DIARRHEA: 0
CONSTIPATION: 0
SHORTNESS OF BREATH: 0
COUGH: 0
VOMITING: 1
ALLERGIC/IMMUNOLOGIC NEGATIVE: 1

## 2018-08-22 ENCOUNTER — HOSPITAL ENCOUNTER (OUTPATIENT)
Dept: MRI IMAGING | Age: 78
Discharge: HOME | End: 2018-08-22
Payer: MEDICARE

## 2018-08-22 LAB
GFR NON-AFRICAN AMERICAN: 40
PERFORMED ON: ABNORMAL
POC CREATININE: 1.3 MG/DL (ref 0.3–1.3)
POC SAMPLE TYPE: ABNORMAL

## 2018-08-22 PROCEDURE — 74183 MRI ABD W/O CNTR FLWD CNTR: CPT

## 2018-08-22 PROCEDURE — A9577 INJ MULTIHANCE: HCPCS | Performed by: INTERNAL MEDICINE

## 2018-08-22 PROCEDURE — 82565 ASSAY OF CREATININE: CPT

## 2018-08-22 PROCEDURE — 6360000004 HC RX CONTRAST MEDICATION: Performed by: INTERNAL MEDICINE

## 2018-08-22 RX ADMIN — GADOBENATE DIMEGLUMINE 15 ML: 529 INJECTION, SOLUTION INTRAVENOUS at 08:02

## 2018-11-12 ENCOUNTER — OFFICE VISIT (OUTPATIENT)
Dept: GASTROENTEROLOGY | Age: 78
End: 2018-11-12
Payer: MEDICARE

## 2018-11-12 VITALS
DIASTOLIC BLOOD PRESSURE: 80 MMHG | HEART RATE: 80 BPM | SYSTOLIC BLOOD PRESSURE: 102 MMHG | WEIGHT: 172.6 LBS | OXYGEN SATURATION: 96 % | HEIGHT: 65 IN | BODY MASS INDEX: 28.76 KG/M2

## 2018-11-12 DIAGNOSIS — Z98.890 S/P DILATATION OF ESOPHAGEAL STRICTURE: ICD-10-CM

## 2018-11-12 DIAGNOSIS — K29.90 GASTRITIS AND DUODENITIS: ICD-10-CM

## 2018-11-12 DIAGNOSIS — Z87.19 S/P DILATATION OF ESOPHAGEAL STRICTURE: ICD-10-CM

## 2018-11-12 DIAGNOSIS — Z87.19 HISTORY OF ESOPHAGEAL STRICTURE: ICD-10-CM

## 2018-11-12 DIAGNOSIS — K31.5 DUODENAL STRICTURE: Primary | ICD-10-CM

## 2018-11-12 PROCEDURE — G8484 FLU IMMUNIZE NO ADMIN: HCPCS | Performed by: NURSE PRACTITIONER

## 2018-11-12 PROCEDURE — 1101F PT FALLS ASSESS-DOCD LE1/YR: CPT | Performed by: NURSE PRACTITIONER

## 2018-11-12 PROCEDURE — 99213 OFFICE O/P EST LOW 20 MIN: CPT | Performed by: NURSE PRACTITIONER

## 2018-11-12 PROCEDURE — G8417 CALC BMI ABV UP PARAM F/U: HCPCS | Performed by: NURSE PRACTITIONER

## 2018-11-12 PROCEDURE — 1036F TOBACCO NON-USER: CPT | Performed by: NURSE PRACTITIONER

## 2018-11-12 PROCEDURE — G8427 DOCREV CUR MEDS BY ELIG CLIN: HCPCS | Performed by: NURSE PRACTITIONER

## 2018-11-12 PROCEDURE — 4040F PNEUMOC VAC/ADMIN/RCVD: CPT | Performed by: NURSE PRACTITIONER

## 2018-11-12 PROCEDURE — G8400 PT W/DXA NO RESULTS DOC: HCPCS | Performed by: NURSE PRACTITIONER

## 2018-11-12 PROCEDURE — 1090F PRES/ABSN URINE INCON ASSESS: CPT | Performed by: NURSE PRACTITIONER

## 2018-11-12 PROCEDURE — 1123F ACP DISCUSS/DSCN MKR DOCD: CPT | Performed by: NURSE PRACTITIONER

## 2018-11-12 RX ORDER — CELECOXIB 200 MG/1
200 CAPSULE ORAL DAILY
COMMUNITY
End: 2022-05-11

## 2018-11-12 ASSESSMENT — ENCOUNTER SYMPTOMS
COUGH: 0
BLOOD IN STOOL: 0
DIARRHEA: 0
CHEST TIGHTNESS: 0
SHORTNESS OF BREATH: 0
BACK PAIN: 1
ABDOMINAL PAIN: 0
TROUBLE SWALLOWING: 0
VOICE CHANGE: 0
CONSTIPATION: 0
NAUSEA: 0
SORE THROAT: 0
RECTAL PAIN: 0
VOMITING: 0
EYES NEGATIVE: 1
ALLERGIC/IMMUNOLOGIC NEGATIVE: 1

## 2018-11-12 NOTE — PROGRESS NOTES
Subjective:      Patient ID: Eliza Iraheta is a 69 y.o. female  Francia Bound, DO    HPI  Chief Complaint   Patient presents with    Follow Up After Procedure       Patient seen in f/u from procedures: EGD with EUS and MRI/MRCP     IMPRESSION:  1. 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 2.  Duodenitis. 3.  Gastritis. 4.  Hiatal hernia. 5.  Distal esophageal stricture status post wire-guided dilation 51-Thai. 6.  No significant pancreatic abnormalities appreciated on somewhat limited  upper EUS as described above. RECOMMENDATIONS:  1. Aggressive PPI treatment at 40 mg b.i.d.  2.  Carafate 3 times a day. 3.  Complete NSAID avoidance. The patient is prescribed Celebrex and I  would ask her to stop this. She also needs to avoid all over-the-counter  NSAIDs. Follow up pathology results and treat H. pylori if positive. 4.  I would recommend outpatient cross-sectional imaging of her upper  abdomen. This could possibly be done with an MRI with contrast and MRCP to  rule out any pancreatic or biliary disease in the area of the duodenal  turn. This will be arranged in my office. FINAL DIAGNOSIS:  A.  Small intestine, biopsy at duodenal stricture: Benign duodenal mucosa  with mild chronic nonspecific inflammation, negative for evidence of  sprue. B.  Stomach, gastric biopsy:   1. Benign gastric mucosa with focal minimal chronic nonspecific  inflammation. 2.  No Helicobacter pylori organisms identified by special stain. COMMENT:    Specimen A: No histologic evidence to explain a stricture is  identified. Clinical correlation is suggested. CBG/CBG    MRI Impression: No discrete pancreatic or duodenal mass identified. Prior cholecystectomy with biliary duct dilatation, favored to reflect reservoir effect. Signed by Dr Brett Otoole on 8/22/2018 10:11 AM    Patient states she started the carafate and pantoprazole. She took as directed for 2 full weeks.  She felt like her symptoms were getting worse. She stopped taking it and her n/v quickly resolved. She has not had any further problems with dysphagia. Reviewed path and procedure reports with patient. Recommendations provided. Q&A. Voices understanding. Patient reports no problems r/t procedure. She has restarted her celebrex. She stopped this for a time but she says her joint pain, stiffness and inflammation were so severe she could hardly move and she has restarted this medication without any return of there gi complaints. Family History   Problem Relation Age of Onset    Breast Cancer Mother     Colon Cancer Neg Hx     Colon Polyps Neg Hx     Esophageal Cancer Neg Hx     Liver Disease Neg Hx     Liver Cancer Neg Hx     Rectal Cancer Neg Hx     Stomach Cancer Neg Hx        Past Medical History:   Diagnosis Date    Arthritis     Fernandes's esophagus     DDD (degenerative disc disease), lumbar     DM [facility]di[facility] mellitus), [facility] 2 ([facility] [address]     Duodenal ulcer     E. coli sepsis ([facility] [address] 02/2014    Elevated lipids     Elevated pancreatic enzyme     Gastritis     GERD (gastroesophageal reflux disease)     Hiatal hernia     History of colon polyps     HTN (hypertension)     Hypercholesterolemia     Hypothyroidism     Nausea and vomiting     Osteoarthritis     other     Non Specified Ulcers    Pernicious anemia     Skin cancer     History OF.  Venous angioma     left-parietal       Past Surgical History:   Procedure Laterality Date    BREAST SURGERY      BG Biopsy    CHOLECYSTECTOMY      COLONOSCOPY  3/16/2005    DR. OTERO    COLONOSCOPY  5/21/2010    tubular adenoma    COLONOSCOPY  12/31/2015    Dr Priyank Aguilar, normal, 5 yr recall    FRACTURE SURGERY      x 2 right ankle    HAND SURGERY Right 07/2012    Dr Asha Adames removed due to arthritis and tendon repair    HYSTERECTOMY, TOTAL ABDOMINAL  1969    KNEE SURGERY Right     LUMBAR [address] SURGERY  11/2008    L5-S1-Dr Akiko Cobb  LUMBAR SPINE SURGERY  2005    OTHER SURGICAL HISTORY Right     Basa Cell Lesion Removal-elbow    OTHER SURGICAL HISTORY Right 2002    ORIF ankle    DC EGD INTRMURAL NEEDLE ASPIR/BIOP ALTERED ANATOMY N/A 8/15/2018    Dr Tomas Keita EGD TRANSORAL BIOPSY SINGLE/MULTIPLE N/A 12/6/2017    [facility]     ROTATOR CUFF REPAIR Left 12/2013    Dr Jorge Luis Tena ENDOSCOPY  5/19/2005    DR. KIM    UPPER GASTROINTESTINAL ENDOSCOPY  10/22/2009    peptic stricture, schatzki ring dilated 46 fr, sm hiatal hernia, gastritis    UPPER GASTROINTESTINAL ENDOSCOPY  10/01/2014    Dr Hauser-empiric dilation, urease neg    UPPER GASTROINTESTINAL ENDOSCOPY N/A 12/18/2015    Dr Priyank Aguilar, stricture/ulcers, repeat 8 wks    UPPER GASTROINTESTINAL ENDOSCOPY  12/6/2017    Dr Hauser-w/dilation over wire, 46 [place]-hiatal hernia, distal esophageal stricture    UPPER GASTROINTESTINAL ENDOSCOPY  8/15/2018    Dr SANYA Finley-w/dilation over wire-51 [place] and EUS-Possible duodenitis, gastritis, duodenal stricture likely peptic stricture nature, hiatal hernia    UPPER GASTROINTESTINAL ENDOSCOPY  8/15/2018    Dr SANYA Finley-w/dilation over wire-51 [place] and EUS-Possible duodenitis, gastritis, duodenal stricture likely peptic stricture nature, hiatal hernia       Current Outpatient Prescriptions   Medication Sig Dispense Refill    celecoxib (CELEBREX) 200 MG capsule Take 200 mg by mouth daily      levothyroxine (SYNTHROID) 75 MCG tablet Take 75 mcg by mouth Daily.  GlipiZIDE (GLUCOTROL PO) Take 10 mg by mouth daily.  MetFORMIN HCl (GLUCOPHAGE PO) Take 1,000 mg by mouth 2 times daily.  LISINOPRIL PO Take 10 mg by mouth daily.       pantoprazole (PROTONIX) 40 MG tablet Take 1 tablet by mouth 2 times daily (before meals) 30 tablet 3    sucralfate (CARAFATE) 1 GM tablet Take 1 tablet by mouth 4 times daily 120 tablet 3   

## 2018-11-16 ENCOUNTER — TRANSCRIBE ORDERS (OUTPATIENT)
Dept: ADMINISTRATIVE | Facility: HOSPITAL | Age: 78
End: 2018-11-16

## 2018-11-16 DIAGNOSIS — Z12.31 ENCOUNTER FOR SCREENING MAMMOGRAM FOR MALIGNANT NEOPLASM OF BREAST: Primary | ICD-10-CM

## 2018-11-16 DIAGNOSIS — Z78.0 ASYMPTOMATIC MENOPAUSAL STATE: ICD-10-CM

## 2018-11-28 ENCOUNTER — HOSPITAL ENCOUNTER (OUTPATIENT)
Dept: MAMMOGRAPHY | Facility: HOSPITAL | Age: 78
Discharge: HOME | End: 2018-11-28
Attending: FAMILY MEDICINE | Admitting: FAMILY MEDICINE

## 2018-11-28 ENCOUNTER — HOSPITAL ENCOUNTER (OUTPATIENT)
Dept: BONE DENSITY | Facility: HOSPITAL | Age: 78
Discharge: HOME | End: 2018-11-28
Attending: FAMILY MEDICINE

## 2018-11-28 DIAGNOSIS — Z12.31 ENCOUNTER FOR SCREENING MAMMOGRAM FOR MALIGNANT NEOPLASM OF BREAST: ICD-10-CM

## 2018-11-28 DIAGNOSIS — Z78.0 ASYMPTOMATIC MENOPAUSAL STATE: ICD-10-CM

## 2018-11-28 PROCEDURE — 77063 BREAST TOMOSYNTHESIS BI: CPT

## 2018-11-28 PROCEDURE — 77067 SCR MAMMO BI INCL CAD: CPT

## 2018-11-28 PROCEDURE — 77080 DXA BONE DENSITY AXIAL: CPT

## 2019-11-20 ENCOUNTER — TRANSCRIBE ORDERS (OUTPATIENT)
Dept: ADMINISTRATIVE | Facility: HOSPITAL | Age: 79
End: 2019-11-20

## 2019-11-20 DIAGNOSIS — Z12.31 ENCOUNTER FOR SCREENING MAMMOGRAM FOR MALIGNANT NEOPLASM OF BREAST: Primary | ICD-10-CM

## 2020-05-22 ENCOUNTER — TRANSCRIBE ORDERS (OUTPATIENT)
Dept: ADMINISTRATIVE | Facility: HOSPITAL | Age: 80
End: 2020-05-22

## 2020-05-22 DIAGNOSIS — Z01.812 ENCOUNTER FOR PREPROCEDURAL LABORATORY EXAMINATION: ICD-10-CM

## 2020-05-22 DIAGNOSIS — E11.9 TYPE 2 DIABETES MELLITUS WITHOUT COMPLICATION, UNSPECIFIED WHETHER LONG TERM INSULIN USE (HCC): Primary | ICD-10-CM

## 2020-05-22 DIAGNOSIS — M54.16 LUMBAR RADICULOPATHY: ICD-10-CM

## 2020-05-29 ENCOUNTER — APPOINTMENT (OUTPATIENT)
Dept: LAB | Facility: HOSPITAL | Age: 80
End: 2020-05-29

## 2020-05-29 LAB
ALBUMIN SERPL-MCNC: 4.6 G/DL (ref 3.5–5.2)
ALBUMIN/GLOB SERPL: 1.9 G/DL
ALP SERPL-CCNC: 71 U/L (ref 39–117)
ALT SERPL W P-5'-P-CCNC: 10 U/L (ref 1–33)
ANION GAP SERPL CALCULATED.3IONS-SCNC: 13.5 MMOL/L (ref 5–15)
AST SERPL-CCNC: 12 U/L (ref 1–32)
BILIRUB SERPL-MCNC: 0.8 MG/DL (ref 0.2–1.2)
BUN BLD-MCNC: 18 MG/DL (ref 8–23)
BUN/CREAT SERPL: 16.7 (ref 7–25)
CALCIUM SPEC-SCNC: 9.7 MG/DL (ref 8.6–10.5)
CHLORIDE SERPL-SCNC: 103 MMOL/L (ref 98–107)
CO2 SERPL-SCNC: 24.5 MMOL/L (ref 22–29)
CREAT BLD-MCNC: 1.08 MG/DL (ref 0.57–1)
GFR SERPL CREATININE-BSD FRML MDRD: 49 ML/MIN/1.73
GLOBULIN UR ELPH-MCNC: 2.4 GM/DL
GLUCOSE BLD-MCNC: 120 MG/DL (ref 65–99)
POTASSIUM BLD-SCNC: 4.1 MMOL/L (ref 3.5–5.2)
PROT SERPL-MCNC: 7 G/DL (ref 6–8.5)
SODIUM BLD-SCNC: 141 MMOL/L (ref 136–145)

## 2020-05-29 PROCEDURE — 80053 COMPREHEN METABOLIC PANEL: CPT | Performed by: ORTHOPAEDIC SURGERY

## 2020-05-29 PROCEDURE — 36415 COLL VENOUS BLD VENIPUNCTURE: CPT | Performed by: ORTHOPAEDIC SURGERY

## 2020-07-03 ENCOUNTER — TRANSCRIBE ORDERS (OUTPATIENT)
Dept: ADMINISTRATIVE | Facility: HOSPITAL | Age: 80
End: 2020-07-03

## 2020-07-03 DIAGNOSIS — Z12.31 ENCOUNTER FOR SCREENING MAMMOGRAM FOR MALIGNANT NEOPLASM OF BREAST: Primary | ICD-10-CM

## 2020-07-03 DIAGNOSIS — Z78.0 POSTMENOPAUSAL STATUS: ICD-10-CM

## 2020-11-30 ENCOUNTER — HOSPITAL ENCOUNTER (OUTPATIENT)
Dept: BONE DENSITY | Facility: HOSPITAL | Age: 80
Discharge: HOME | End: 2020-11-30

## 2020-11-30 ENCOUNTER — HOSPITAL ENCOUNTER (OUTPATIENT)
Dept: MAMMOGRAPHY | Facility: HOSPITAL | Age: 80
Discharge: HOME | End: 2020-11-30

## 2020-11-30 DIAGNOSIS — Z12.31 ENCOUNTER FOR SCREENING MAMMOGRAM FOR MALIGNANT NEOPLASM OF BREAST: ICD-10-CM

## 2020-11-30 DIAGNOSIS — Z78.0 POSTMENOPAUSAL STATUS: ICD-10-CM

## 2020-11-30 PROCEDURE — 77080 DXA BONE DENSITY AXIAL: CPT

## 2020-11-30 PROCEDURE — 77063 BREAST TOMOSYNTHESIS BI: CPT

## 2020-11-30 PROCEDURE — 77067 SCR MAMMO BI INCL CAD: CPT

## 2021-07-20 ENCOUNTER — TRANSCRIBE ORDERS (OUTPATIENT)
Dept: ADMINISTRATIVE | Facility: HOSPITAL | Age: 81
End: 2021-07-20

## 2021-07-20 DIAGNOSIS — Z12.31 ENCOUNTER FOR SCREENING MAMMOGRAM FOR MALIGNANT NEOPLASM OF BREAST: Primary | ICD-10-CM

## 2021-07-25 ENCOUNTER — APPOINTMENT (OUTPATIENT)
Dept: GENERAL RADIOLOGY | Age: 81
DRG: 247 | End: 2021-07-25
Payer: MEDICARE

## 2021-07-25 ENCOUNTER — HOSPITAL ENCOUNTER (INPATIENT)
Age: 81
LOS: 2 days | Discharge: NURSING, REHAB OR LONG-TERM CARE | DRG: 247 | End: 2021-07-27
Attending: EMERGENCY MEDICINE | Admitting: INTERNAL MEDICINE
Payer: MEDICARE

## 2021-07-25 DIAGNOSIS — I21.19 ACUTE ST ELEVATION MYOCARDIAL INFARCTION (STEMI) OF INFEROLATERAL WALL (HCC): Primary | ICD-10-CM

## 2021-07-25 LAB
ALBUMIN SERPL-MCNC: 4.1 G/DL (ref 3.5–5.2)
ALP BLD-CCNC: 93 U/L (ref 35–104)
ALT SERPL-CCNC: 13 U/L (ref 5–33)
ANION GAP SERPL CALCULATED.3IONS-SCNC: 13 MMOL/L (ref 7–19)
APTT: 30.3 SEC (ref 26–36.2)
AST SERPL-CCNC: 13 U/L (ref 5–32)
BACTERIA: NEGATIVE /HPF
BASOPHILS ABSOLUTE: 0.1 K/UL (ref 0–0.2)
BASOPHILS RELATIVE PERCENT: 0.9 % (ref 0–1)
BILIRUB SERPL-MCNC: 0.5 MG/DL (ref 0.2–1.2)
BILIRUBIN URINE: NEGATIVE
BLOOD, URINE: ABNORMAL
BUN BLDV-MCNC: 19 MG/DL (ref 8–23)
CALCIUM SERPL-MCNC: 9.2 MG/DL (ref 8.8–10.2)
CHLORIDE BLD-SCNC: 103 MMOL/L (ref 98–111)
CLARITY: CLEAR
CO2: 20 MMOL/L (ref 22–29)
COLOR: YELLOW
CREAT SERPL-MCNC: 0.9 MG/DL (ref 0.5–0.9)
CRYSTALS, UA: ABNORMAL /HPF
EOSINOPHILS ABSOLUTE: 0.3 K/UL (ref 0–0.6)
EOSINOPHILS RELATIVE PERCENT: 3.3 % (ref 0–5)
EPITHELIAL CELLS, UA: 1 /HPF (ref 0–5)
GFR AFRICAN AMERICAN: >59
GFR NON-AFRICAN AMERICAN: >60
GLUCOSE BLD-MCNC: 123 MG/DL (ref 70–99)
GLUCOSE BLD-MCNC: 150 MG/DL (ref 70–99)
GLUCOSE BLD-MCNC: 184 MG/DL (ref 70–99)
GLUCOSE BLD-MCNC: 188 MG/DL (ref 74–109)
GLUCOSE URINE: 100 MG/DL
HBA1C MFR BLD: 6.5 % (ref 4–6)
HCT VFR BLD CALC: 41.8 % (ref 37–47)
HEMOGLOBIN: 13.1 G/DL (ref 12–16)
HYALINE CASTS: 0 /HPF (ref 0–8)
IMMATURE GRANULOCYTES #: 0 K/UL
INR BLD: 0.9 (ref 0.88–1.18)
KETONES, URINE: ABNORMAL MG/DL
LEUKOCYTE ESTERASE, URINE: ABNORMAL
LIPASE: 86 U/L (ref 13–60)
LV EF: 45 %
LVEF MODALITY: NORMAL
LYMPHOCYTES ABSOLUTE: 2.5 K/UL (ref 1.1–4.5)
LYMPHOCYTES RELATIVE PERCENT: 31 % (ref 20–40)
MCH RBC QN AUTO: 27.8 PG (ref 27–31)
MCHC RBC AUTO-ENTMCNC: 31.3 G/DL (ref 33–37)
MCV RBC AUTO: 88.7 FL (ref 81–99)
MONOCYTES ABSOLUTE: 0.5 K/UL (ref 0–0.9)
MONOCYTES RELATIVE PERCENT: 6 % (ref 0–10)
NEUTROPHILS ABSOLUTE: 4.7 K/UL (ref 1.5–7.5)
NEUTROPHILS RELATIVE PERCENT: 58.4 % (ref 50–65)
NITRITE, URINE: NEGATIVE
PDW BLD-RTO: 12.1 % (ref 11.5–14.5)
PERFORMED ON: ABNORMAL
PH UA: 6.5 (ref 5–8)
PLATELET # BLD: 274 K/UL (ref 130–400)
PMV BLD AUTO: 9.9 FL (ref 9.4–12.3)
POC ACT LR: 346 SEC
POTASSIUM REFLEX MAGNESIUM: 4.1 MMOL/L (ref 3.5–5)
PRO-BNP: 79 PG/ML (ref 0–1800)
PROTEIN UA: NEGATIVE MG/DL
PROTHROMBIN TIME: 12.4 SEC (ref 12–14.6)
RBC # BLD: 4.71 M/UL (ref 4.2–5.4)
RBC UA: 0 /HPF (ref 0–4)
SARS-COV-2, NAAT: NOT DETECTED
SODIUM BLD-SCNC: 136 MMOL/L (ref 136–145)
SPECIFIC GRAVITY UA: 1.02 (ref 1–1.03)
TOTAL PROTEIN: 6.9 G/DL (ref 6.6–8.7)
TROPONIN: <0.01 NG/ML (ref 0–0.03)
UROBILINOGEN, URINE: 0.2 E.U./DL
WBC # BLD: 8 K/UL (ref 4.8–10.8)
WBC UA: 2 /HPF (ref 0–5)

## 2021-07-25 PROCEDURE — 85610 PROTHROMBIN TIME: CPT

## 2021-07-25 PROCEDURE — 99283 EMERGENCY DEPT VISIT LOW MDM: CPT

## 2021-07-25 PROCEDURE — 99223 1ST HOSP IP/OBS HIGH 75: CPT | Performed by: INTERNAL MEDICINE

## 2021-07-25 PROCEDURE — B2111ZZ FLUOROSCOPY OF MULTIPLE CORONARY ARTERIES USING LOW OSMOLAR CONTRAST: ICD-10-PCS | Performed by: INTERNAL MEDICINE

## 2021-07-25 PROCEDURE — B2151ZZ FLUOROSCOPY OF LEFT HEART USING LOW OSMOLAR CONTRAST: ICD-10-PCS | Performed by: INTERNAL MEDICINE

## 2021-07-25 PROCEDURE — 6360000002 HC RX W HCPCS

## 2021-07-25 PROCEDURE — 93458 L HRT ARTERY/VENTRICLE ANGIO: CPT

## 2021-07-25 PROCEDURE — 2500000003 HC RX 250 WO HCPCS

## 2021-07-25 PROCEDURE — 027034Z DILATION OF CORONARY ARTERY, ONE ARTERY WITH DRUG-ELUTING INTRALUMINAL DEVICE, PERCUTANEOUS APPROACH: ICD-10-PCS | Performed by: INTERNAL MEDICINE

## 2021-07-25 PROCEDURE — 81001 URINALYSIS AUTO W/SCOPE: CPT

## 2021-07-25 PROCEDURE — 83690 ASSAY OF LIPASE: CPT

## 2021-07-25 PROCEDURE — 36415 COLL VENOUS BLD VENIPUNCTURE: CPT

## 2021-07-25 PROCEDURE — 2000000000 HC ICU R&B

## 2021-07-25 PROCEDURE — C1894 INTRO/SHEATH, NON-LASER: HCPCS

## 2021-07-25 PROCEDURE — 71045 X-RAY EXAM CHEST 1 VIEW: CPT

## 2021-07-25 PROCEDURE — 99153 MOD SED SAME PHYS/QHP EA: CPT

## 2021-07-25 PROCEDURE — 87635 SARS-COV-2 COVID-19 AMP PRB: CPT

## 2021-07-25 PROCEDURE — 6370000000 HC RX 637 (ALT 250 FOR IP): Performed by: INTERNAL MEDICINE

## 2021-07-25 PROCEDURE — 6360000004 HC RX CONTRAST MEDICATION: Performed by: EMERGENCY MEDICINE

## 2021-07-25 PROCEDURE — 99152 MOD SED SAME PHYS/QHP 5/>YRS: CPT

## 2021-07-25 PROCEDURE — C1887 CATHETER, GUIDING: HCPCS

## 2021-07-25 PROCEDURE — 85347 COAGULATION TIME ACTIVATED: CPT

## 2021-07-25 PROCEDURE — 80053 COMPREHEN METABOLIC PANEL: CPT

## 2021-07-25 PROCEDURE — 6370000000 HC RX 637 (ALT 250 FOR IP)

## 2021-07-25 PROCEDURE — 85025 COMPLETE CBC W/AUTO DIFF WBC: CPT

## 2021-07-25 PROCEDURE — C1769 GUIDE WIRE: HCPCS

## 2021-07-25 PROCEDURE — C1874 STENT, COATED/COV W/DEL SYS: HCPCS

## 2021-07-25 PROCEDURE — 96361 HYDRATE IV INFUSION ADD-ON: CPT

## 2021-07-25 PROCEDURE — 96374 THER/PROPH/DIAG INJ IV PUSH: CPT

## 2021-07-25 PROCEDURE — C1725 CATH, TRANSLUMIN NON-LASER: HCPCS

## 2021-07-25 PROCEDURE — 2580000003 HC RX 258: Performed by: EMERGENCY MEDICINE

## 2021-07-25 PROCEDURE — 83880 ASSAY OF NATRIURETIC PEPTIDE: CPT

## 2021-07-25 PROCEDURE — 85730 THROMBOPLASTIN TIME PARTIAL: CPT

## 2021-07-25 PROCEDURE — 4A023N7 MEASUREMENT OF CARDIAC SAMPLING AND PRESSURE, LEFT HEART, PERCUTANEOUS APPROACH: ICD-10-PCS | Performed by: INTERNAL MEDICINE

## 2021-07-25 PROCEDURE — 2580000003 HC RX 258: Performed by: INTERNAL MEDICINE

## 2021-07-25 PROCEDURE — 2709999900 HC NON-CHARGEABLE SUPPLY

## 2021-07-25 PROCEDURE — 92941 PRQ TRLML REVSC TOT OCCL AMI: CPT

## 2021-07-25 PROCEDURE — 93005 ELECTROCARDIOGRAM TRACING: CPT | Performed by: EMERGENCY MEDICINE

## 2021-07-25 PROCEDURE — 83036 HEMOGLOBIN GLYCOSYLATED A1C: CPT

## 2021-07-25 PROCEDURE — 82947 ASSAY GLUCOSE BLOOD QUANT: CPT

## 2021-07-25 PROCEDURE — 84484 ASSAY OF TROPONIN QUANT: CPT

## 2021-07-25 RX ORDER — SODIUM CHLORIDE 0.9 % (FLUSH) 0.9 %
5-40 SYRINGE (ML) INJECTION PRN
Status: DISCONTINUED | OUTPATIENT
Start: 2021-07-25 | End: 2021-07-27 | Stop reason: HOSPADM

## 2021-07-25 RX ORDER — SODIUM CHLORIDE 0.9 % (FLUSH) 0.9 %
5-40 SYRINGE (ML) INJECTION EVERY 12 HOURS SCHEDULED
Status: DISCONTINUED | OUTPATIENT
Start: 2021-07-25 | End: 2021-07-27 | Stop reason: HOSPADM

## 2021-07-25 RX ORDER — ACETAMINOPHEN 325 MG/1
650 TABLET ORAL EVERY 4 HOURS PRN
Status: DISCONTINUED | OUTPATIENT
Start: 2021-07-25 | End: 2021-07-27 | Stop reason: HOSPADM

## 2021-07-25 RX ORDER — NOREPINEPHRINE BIT/0.9 % NACL 16MG/250ML
2-100 INFUSION BOTTLE (ML) INTRAVENOUS CONTINUOUS
Status: DISCONTINUED | OUTPATIENT
Start: 2021-07-25 | End: 2021-07-26

## 2021-07-25 RX ORDER — HEPARIN SODIUM 10000 [USP'U]/100ML
INJECTION, SOLUTION INTRAVENOUS
Status: DISCONTINUED
Start: 2021-07-25 | End: 2021-07-25

## 2021-07-25 RX ORDER — PANTOPRAZOLE SODIUM 40 MG/1
40 TABLET, DELAYED RELEASE ORAL
Status: DISCONTINUED | OUTPATIENT
Start: 2021-07-25 | End: 2021-07-27 | Stop reason: HOSPADM

## 2021-07-25 RX ORDER — NICOTINE POLACRILEX 4 MG
15 LOZENGE BUCCAL PRN
Status: DISCONTINUED | OUTPATIENT
Start: 2021-07-25 | End: 2021-07-27 | Stop reason: HOSPADM

## 2021-07-25 RX ORDER — SODIUM CHLORIDE 9 MG/ML
25 INJECTION, SOLUTION INTRAVENOUS PRN
Status: DISCONTINUED | OUTPATIENT
Start: 2021-07-25 | End: 2021-07-27 | Stop reason: HOSPADM

## 2021-07-25 RX ORDER — CLOPIDOGREL BISULFATE 75 MG/1
75 TABLET ORAL DAILY
Status: DISCONTINUED | OUTPATIENT
Start: 2021-07-26 | End: 2021-07-27 | Stop reason: HOSPADM

## 2021-07-25 RX ORDER — GLIPIZIDE 10 MG/1
10 TABLET ORAL DAILY
Status: DISCONTINUED | OUTPATIENT
Start: 2021-07-25 | End: 2021-07-27 | Stop reason: HOSPADM

## 2021-07-25 RX ORDER — SODIUM CHLORIDE, SODIUM LACTATE, POTASSIUM CHLORIDE, CALCIUM CHLORIDE 600; 310; 30; 20 MG/100ML; MG/100ML; MG/100ML; MG/100ML
1000 INJECTION, SOLUTION INTRAVENOUS ONCE
Status: COMPLETED | OUTPATIENT
Start: 2021-07-25 | End: 2021-07-25

## 2021-07-25 RX ORDER — INSULIN GLARGINE 100 [IU]/ML
16 INJECTION, SOLUTION SUBCUTANEOUS NIGHTLY
COMMUNITY

## 2021-07-25 RX ORDER — LISINOPRIL 10 MG/1
10 TABLET ORAL DAILY
Status: DISCONTINUED | OUTPATIENT
Start: 2021-07-25 | End: 2021-07-27 | Stop reason: HOSPADM

## 2021-07-25 RX ORDER — LABETALOL HYDROCHLORIDE 5 MG/ML
10 INJECTION, SOLUTION INTRAVENOUS EVERY 4 HOURS PRN
Status: DISCONTINUED | OUTPATIENT
Start: 2021-07-25 | End: 2021-07-27 | Stop reason: HOSPADM

## 2021-07-25 RX ORDER — DEXTROSE MONOHYDRATE 25 G/50ML
12.5 INJECTION, SOLUTION INTRAVENOUS PRN
Status: DISCONTINUED | OUTPATIENT
Start: 2021-07-25 | End: 2021-07-27 | Stop reason: HOSPADM

## 2021-07-25 RX ORDER — LEVOTHYROXINE SODIUM 0.07 MG/1
75 TABLET ORAL DAILY
Status: DISCONTINUED | OUTPATIENT
Start: 2021-07-25 | End: 2021-07-27 | Stop reason: HOSPADM

## 2021-07-25 RX ORDER — ASPIRIN 81 MG/1
81 TABLET, CHEWABLE ORAL DAILY
Status: DISCONTINUED | OUTPATIENT
Start: 2021-07-26 | End: 2021-07-27 | Stop reason: HOSPADM

## 2021-07-25 RX ORDER — DEXTROSE MONOHYDRATE 50 MG/ML
100 INJECTION, SOLUTION INTRAVENOUS PRN
Status: DISCONTINUED | OUTPATIENT
Start: 2021-07-25 | End: 2021-07-27 | Stop reason: HOSPADM

## 2021-07-25 RX ORDER — ROSUVASTATIN CALCIUM 20 MG/1
40 TABLET, COATED ORAL NIGHTLY
Status: DISCONTINUED | OUTPATIENT
Start: 2021-07-25 | End: 2021-07-27 | Stop reason: HOSPADM

## 2021-07-25 RX ORDER — HEPARIN SODIUM 5000 [USP'U]/ML
INJECTION, SOLUTION INTRAVENOUS; SUBCUTANEOUS
Status: COMPLETED
Start: 2021-07-25 | End: 2021-07-25

## 2021-07-25 RX ORDER — HEPARIN SODIUM 5000 [USP'U]/ML
5000 INJECTION, SOLUTION INTRAVENOUS; SUBCUTANEOUS ONCE
Status: COMPLETED | OUTPATIENT
Start: 2021-07-25 | End: 2021-07-25

## 2021-07-25 RX ORDER — SUCRALFATE 1 G/1
1 TABLET ORAL 4 TIMES DAILY
Status: DISCONTINUED | OUTPATIENT
Start: 2021-07-25 | End: 2021-07-27 | Stop reason: HOSPADM

## 2021-07-25 RX ORDER — CARVEDILOL 3.12 MG/1
3.12 TABLET ORAL 2 TIMES DAILY WITH MEALS
Status: DISCONTINUED | OUTPATIENT
Start: 2021-07-25 | End: 2021-07-27 | Stop reason: HOSPADM

## 2021-07-25 RX ORDER — INSULIN GLARGINE 100 [IU]/ML
16 INJECTION, SOLUTION SUBCUTANEOUS NIGHTLY
Status: DISCONTINUED | OUTPATIENT
Start: 2021-07-25 | End: 2021-07-27 | Stop reason: HOSPADM

## 2021-07-25 RX ORDER — SODIUM CHLORIDE 9 MG/ML
INJECTION, SOLUTION INTRAVENOUS CONTINUOUS
Status: ACTIVE | OUTPATIENT
Start: 2021-07-25 | End: 2021-07-25

## 2021-07-25 RX ADMIN — ROSUVASTATIN 40 MG: 20 TABLET, FILM COATED ORAL at 20:37

## 2021-07-25 RX ADMIN — SUCRALFATE 1 G: 1 TABLET ORAL at 20:37

## 2021-07-25 RX ADMIN — HEPARIN SODIUM 5000 UNITS: 5000 INJECTION INTRAVENOUS; SUBCUTANEOUS at 09:19

## 2021-07-25 RX ADMIN — SODIUM CHLORIDE: 9 INJECTION, SOLUTION INTRAVENOUS at 16:06

## 2021-07-25 RX ADMIN — SODIUM CHLORIDE, POTASSIUM CHLORIDE, SODIUM LACTATE AND CALCIUM CHLORIDE 1000 ML: 600; 310; 30; 20 INJECTION, SOLUTION INTRAVENOUS at 09:20

## 2021-07-25 RX ADMIN — SODIUM CHLORIDE, PRESERVATIVE FREE 10 ML: 5 INJECTION INTRAVENOUS at 20:37

## 2021-07-25 RX ADMIN — Medication 16 UNITS: at 20:37

## 2021-07-25 RX ADMIN — IOPAMIDOL 130 ML: 612 INJECTION, SOLUTION INTRAVENOUS at 10:46

## 2021-07-25 RX ADMIN — LISINOPRIL 10 MG: 10 TABLET ORAL at 17:31

## 2021-07-25 RX ADMIN — HEPARIN SODIUM 5000 UNITS: 5000 INJECTION, SOLUTION INTRAVENOUS; SUBCUTANEOUS at 09:19

## 2021-07-25 RX ADMIN — CARVEDILOL 3.12 MG: 3.12 TABLET, FILM COATED ORAL at 16:04

## 2021-07-25 ASSESSMENT — ENCOUNTER SYMPTOMS
VOMITING: 0
SHORTNESS OF BREATH: 0
VOICE CHANGE: 0
ABDOMINAL PAIN: 0
EYE REDNESS: 0
COUGH: 0
DIARRHEA: 0
RHINORRHEA: 0
EYE PAIN: 0

## 2021-07-25 ASSESSMENT — PAIN SCALES - GENERAL
PAINLEVEL_OUTOF10: 0

## 2021-07-25 NOTE — H&P
Patient:    Cole Bowman  : 1940         Chief Complaint: Acute inferior wall MI  HPI: Patient is a 80-year-old white female woke up at 6:30 AM with substernal chest pain with radiation down both arms and the throat associated with shortness of breath, diaphoresis and nausea as well as vomiting. After 1 hour is subsided. She was seen in the emergency room EKG showed acute inferior wall myocardial infarct. Patient is known to have hypertension and hyperlipidemia with diabetes. She is a non-smoker and drinks occasionally. She does not use recreational drug. In a good day, patient can do anything she wants to do with no difficulty. There has been no history of myocardial infarct or CVA    Review of Systems:  HENNT: normal  PULMONARY: normal   CVS:see history of present illness  ABD: denies any abdominal pain  PERIPHERL: normal  MSK: Chronic back pain and leg pain. Right ankle surgery in the past  CNS: Denies any dizziness, syncopal episode or history of stroke  Renal: Denies any history of dysuria or frequency    Cardiac Specific Data:  Hypertension, hyperlipidemia    Past Medical History:  Hypertension, hyperlipidemia, degenerative arthritis    Past Surgical History:  Surgery of the right ankle    Past Family History: Mother  of cancer    Past Social History:   with 2 healthy grown children. Patient is retired    Allergies:  Vancomycin and Januvia    Home Meds:  As stated in the chart    Current Meds:  Aspirin    Current Infused Meds:  None    Physical Exam:  170/80, 65 /min afebrile    PHYSICAL EXAM:  HENNT: normal  PULMONARY: normal to inspection, palpation, percussion and ascultation  CVS:Normal neck vein, S1 and S2 normal, no murmur  ABD: liver and spleen not palpable, nontender, bowel sound normal  PERIPHERL: all pulses are normal with no bruit  MSK: no swelling of the lower extremities  CNS: Normal CN 2,3,4,6,5,7,9,10,11,and 12.   Oriented to time, place and person    Labs:  Pending  EKG showed acute inferior wall myocardial infarct, sinus bradycardia CK, CKMB, Troponin: Pending    Last 3 BNP:  Pending              Assessment and Plan:  1. Acute inferior wall myocardial infarct, hemodynamically stable  2. Hypertension, diabetes and hyperlipidemia  3. Degenerative arthritis of the low back and lower extremities    Plan: Patient is a suitable candidate for STEMI protocol. The risk and benefits of procedure been explained to the patient risks include but not limited to myocardial infarction or vascular accident, leg problem, kidney problem, stroke and possibility of emergency bypass surgery patient and her  have given the consent    ASA class III  Mallampati class II    This dictation was performed using [address] [place]. Mistake and misspelling may have been created without  realizing them. Please notify me for clarification.   Thank you

## 2021-07-25 NOTE — LETTER
[facility]  Cardiac Rehab Department  [address] [place] [address] [place]   [phone]  Toll Free [phone]              July 28, 2021    Dear Renita Shah,    In follow-up to your recent hospitalization, please find this informational packet that has been sent to you on heart disease and the guidelines you are to follow concerning your present cardiac condition and immediate recovery. Due to your cardiac diagnosis and intervention you now qualify for participation in a Phase II Outpatient Cardiac Rehab Program.  This elective service has been shown to significantly reduce cardiac mortality by 26-31% among patients such as yourself! A brochure has been included in this mailing for the purpose of providing you with a brief overview of program components. Simply contact [address] [place] [address] at [phone] or the hospital nearest your residence to inquire about program specifics and the opportunity to enroll. You may inquire about the Cardiac Rehab Program at [facility].  The telephone number is [phone]. Feel free to reach out to us now or at any other time and our staff will be more than pleased to assist you. Thank you. To the betterment of your health,        [facility] Cardiac Rehab Staff    AMY Mcallister, MA Humberto Stubbs RN  Registered Nurse  Exercise Physiologist  Registered Nurse

## 2021-07-25 NOTE — PROCEDURES
artery was occluded at its midportion. After PCI, the mid and distal right coronary artery was large in caliber. The mid posterolateral branch had a narrowing of 50% of a short segment. Otherwise it was free of significant disease    Interventional procedure: Stenting of the mid right coronary artery  Guiding catheter used: 6 Hungarian right Abhishek 4 guidewire used: Run-through balloon used: Trek balloon: 2.0x15 stent used: Resolute Luther: 4.0x22  Procedures in detail: The guidewire had no difficulty passing down the distal right coronary artery through the occluded mid right coronary artery. The balloon was used to dilate the occlusion at 20 stacy. The stent was then deployed at 18 stacy. Residual stenosis was 0%. BEV-3 flow was reestablished        FLUOROSCOPY TIME as stated in the chart  CONTRAST VOLUME 130 cc. ESTIMATED BLOOD LOSS: Minimal  COMPLICATIONS: None             CONCLUSIONS:   #1 successful stenting of the mid right coronary artery. BEV flow: Pre-PCI was 0, post PCI 3. Stenosis: Pre-PCI was 100%, post PCI was 0%. Type C lesion  #2 moderately impaired left ventricular systolic wall motion  #3 moderate disease of the mid left anterior descending and mid posterolateral branch       RECOMMENDATIONS:    patient will be treated with aspirin, P2 Y 12, high intensity statin, beta-blocker, ACE inhibitor. Metformin will be withheld. SGLT2 inhibitor may be considered. Patient will be asked to lose weight. This dictation was performed using [address]. Mistake and misspelling may have been created without  realizing them. Please notify me for clarification.   Thank you  Evgeny Rodriguez MD  7/25/2021, @NO

## 2021-07-25 NOTE — PROGRESS NOTES
Patient received from cath lab. Patient alert. Attached to ICU monitors. Vital signs stable.     Electronically signed by Rashel Mulligan RN on 7/25/2021 at 11:48 AM

## 2021-07-25 NOTE — ED NOTES
Bed: 01-A  Expected date:   Expected time:   Means of arrival:   Comments:  Ems edvin stemi alert     Miki Dela Cruz RN  07/25/21 6703

## 2021-07-25 NOTE — CONSULTS
Hospitalist Initial Consultation  [facility]    Patient: Ryan Esquivel   : 1940   MRN: [id number]  Code Status: Full Code  PCP: Gabby Paulino DO  Date of Service: 2021    History of Present Illness:   64-year-old female with past medical history as listed below presented to ER with chest pain. Work-up revealed acute inferior wall STEMI. Patient taken for emergent cath per Dr. Donta Sanchez.  S/p mid RCA stenting. Admitted to ICU post cath. Medicine consult ordered in this regard. Review of Systems:   A comprehensive review of systems was negative except for: Cardiovascular: positive for chest pain    Past Medical History:     Past Medical History:   Diagnosis Date    Arthritis     Fernandes's esophagus     DDD (degenerative disc disease), lumbar     DM (diabetes mellitus), type 2 ([address]     Duodenal ulcer     E. coli sepsis ([facility] [address] 2014    Elevated lipids     Elevated pancreatic enzyme     Gastritis     GERD (gastroesophageal reflux disease)     Hiatal hernia     History of colon polyps     HTN (hypertension)     Hypercholesterolemia     Hypothyroidism     Nausea and vomiting     Osteoarthritis     other     Non Specified Ulcers    Pernicious anemia     Skin cancer     History OF.  Venous angioma     left-parietal         Past Surgical History:     Past Surgical History:   Procedure Laterality Date    BREAST SURGERY      BG Biopsy    CHOLECYSTECTOMY      COLONOSCOPY  3/16/2005    DR. OTERO    COLONOSCOPY  2010    tubular adenoma    COLONOSCOPY  2015    Dr Quin Ahn, normal, 5 yr recall    FRACTURE SURGERY      x 2 right ankle    HAND SURGERY Right 2012    Dr Viviana Alfaro removed due to arthritis and tendon repair    HYSTERECTOMY, [address] Right     LUMBAR [address] SURGERY  2008    L5-S1-Dr Woody 50  2005    OTHER SURGICAL HISTORY Right     Basa Cell Lesion Years since quittin.8    Smokeless tobacco: Never Used   Vaping Use    Vaping Use: Never used   Substance and Sexual Activity    Alcohol use: Yes     Comment: glass of wine a couple times per month    Drug use: No    Sexual activity: None   Other Topics Concern    None   Social History Narrative    None     Social Determinants of Health     Financial Resource Strain:     Difficulty of Paying Living Expenses:    Food Insecurity:     Worried About Running Out of Food in the Last Year:     Ran Out of Food in the Last Year:    Transportation Needs:     Lack of Transportation (Medical):  Lack of Transportation (Non-Medical):    Physical Activity:     Days of Exercise per Week:     Minutes of Exercise per Session:    Stress:     Feeling of Stress :    Social Connections:     Frequency of Communication with Friends and Family:     Frequency of Social Gatherings with Friends and Family:     Attends Alevism Services:     Active Member of Clubs or Organizations:     Attends Club or Organization Meetings:     Marital Status:    Intimate Partner Violence:     Fear of Current or Ex-Partner:     Emotionally Abused:     Physically Abused:     Sexually Abused:        Prior to Admission Medications:   Medications Prior to Admission: insulin glargine (LANTUS) 100 UNIT/ML injection vial, Inject 16 Units into the skin nightly  celecoxib (CELEBREX) 200 MG capsule, Take 200 mg by mouth daily  [DISCONTINUED] pantoprazole (PROTONIX) 40 MG tablet, Take 1 tablet by mouth 2 times daily (before meals)  [DISCONTINUED] sucralfate (CARAFATE) 1 GM tablet, Take 1 tablet by mouth 4 times daily  rosuvastatin (CRESTOR) 20 MG tablet, Take 20 mg by mouth daily  buPROPion (WELLBUTRIN XL) 150 MG extended release tablet, Take 150 mg by mouth every morning  levothyroxine (SYNTHROID) 75 MCG tablet, Take 75 mcg by mouth Daily. MetFORMIN HCl (GLUCOPHAGE PO), Take 1,000 mg by mouth 2 times daily.   LISINOPRIL PO, Take 10 mg by mouth daily. [DISCONTINUED] GlipiZIDE (GLUCOTROL PO), Take 10 mg by mouth daily. Allergies:      Allergies   Allergen Reactions    Vancomycin     Januvia [Sitagliptin] Nausea And Vomiting         Physical Exam:   /74   Pulse 80   Temp 97.8 °F (36.6 °C) (Temporal)   Resp 15   Ht 5' 4.5\" (1.638 m)   Wt 168 lb (76.2 kg)   SpO2 96%   BMI 28.39 kg/m²     General: no acute distress  HEENT: normocephalic, atraumatic  Neck: supple, symmetrical, trachea midline   Lungs: clear to auscultation bilaterally   Cardiovascular: s1 and s2 normal  Abdomen: soft, positive bowel sounds  Extremities: no edema or cyanosis   Neuro: aaox3, no focal deficits   Skin: normal color and texture     Recent Results (from the past 72 hour(s))   CBC Auto Differential    Collection Time: 07/25/21  9:16 AM   Result Value Ref Range    WBC 8.0 4.8 - 10.8 K/uL    RBC 4.71 4.20 - 5.40 M/uL    Hemoglobin 13.1 12.0 - 16.0 g/dL    Hematocrit 41.8 37.0 - 47.0 %    MCV 88.7 81.0 - 99.0 fL    MCH 27.8 27.0 - 31.0 pg    MCHC 31.3 (L) 33.0 - 37.0 g/dL    RDW 12.1 11.5 - 14.5 %    Platelets 274 602 - 511 K/uL    MPV 9.9 9.4 - 12.3 fL    Neutrophils % 58.4 50.0 - 65.0 %    Lymphocytes % 31.0 20.0 - 40.0 %    Monocytes % 6.0 0.0 - 10.0 %    Eosinophils % 3.3 0.0 - 5.0 %    Basophils % 0.9 0.0 - 1.0 %    Neutrophils Absolute 4.7 1.5 - 7.5 K/uL    Immature Granulocytes # 0.0 K/uL    Lymphocytes Absolute 2.5 1.1 - 4.5 K/uL    Monocytes Absolute 0.50 0.00 - 0.90 K/uL    Eosinophils Absolute 0.30 0.00 - 0.60 K/uL    Basophils Absolute 0.10 0.00 - 0.20 K/uL   Comprehensive Metabolic Panel w/ Reflex to MG    Collection Time: 07/25/21  9:16 AM   Result Value Ref Range    Sodium 136 136 - 145 mmol/L    Potassium reflex Magnesium 4.1 3.5 - 5.0 mmol/L    Chloride 103 98 - 111 mmol/L    CO2 20 (L) 22 - 29 mmol/L    Anion Gap 13 7 - 19 mmol/L    Glucose 188 (H) 74 - 109 mg/dL    BUN 19 8 - 23 mg/dL    CREATININE 0.9 0.5 - 0.9 mg/dL    GFR Non-African American Collection Time: 07/25/21  2:36 PM   Result Value Ref Range    Bacteria, UA NEGATIVE (A) None Seen /HPF    Crystals, UA NEG (A) None Seen /HPF    Hyaline Casts, UA 0 0 - 8 /HPF    WBC, UA 2 0 - 5 /HPF    RBC, UA 0 0 - 4 /HPF    Epithelial Cells, UA 1 0 - 5 /HPF   POCT Glucose    Collection Time: 07/25/21  4:05 PM   Result Value Ref Range    POC Glucose 123 (H) 70 - 99 mg/dl    Performed on [employer]        I/O this shift: In: 137 [P.O.:290; I.V.:295]  Out: 875 [Urine:875]    No results found.     Assessment and Plan:   Acute inferior wall STEMI  Taken for emergent cath per Dr. Millicent Michelle  S/p mid RCA stenting  Meds per cardiology  Hemodynamically stable currently  TTE    DM2  Meds on board  HbA1c 6.5    DVT prophylaxis  Per primary    Total critical care time: 70 minutes  Total time spent managing the care of this patient: 70 minutes    Zachary Kramer MD  7/25/2021 6:45 PM

## 2021-07-26 LAB
ALBUMIN SERPL-MCNC: 3.6 G/DL (ref 3.5–5.2)
ALP BLD-CCNC: 79 U/L (ref 35–104)
ALT SERPL-CCNC: 48 U/L (ref 5–33)
ANION GAP SERPL CALCULATED.3IONS-SCNC: 7 MMOL/L (ref 7–19)
AST SERPL-CCNC: 185 U/L (ref 5–32)
BASOPHILS ABSOLUTE: 0.1 K/UL (ref 0–0.2)
BASOPHILS RELATIVE PERCENT: 0.5 % (ref 0–1)
BILIRUB SERPL-MCNC: 0.7 MG/DL (ref 0.2–1.2)
BUN BLDV-MCNC: 12 MG/DL (ref 8–23)
CALCIUM SERPL-MCNC: 9 MG/DL (ref 8.8–10.2)
CHLORIDE BLD-SCNC: 103 MMOL/L (ref 98–111)
CO2: 26 MMOL/L (ref 22–29)
CREAT SERPL-MCNC: 0.9 MG/DL (ref 0.5–0.9)
EKG P AXIS: -59 DEGREES
EKG P-R INTERVAL: 220 MS
EKG Q-T INTERVAL: 356 MS
EKG QRS DURATION: 112 MS
EKG QTC CALCULATION (BAZETT): 371 MS
EKG T AXIS: 107 DEGREES
EOSINOPHILS ABSOLUTE: 0.2 K/UL (ref 0–0.6)
EOSINOPHILS RELATIVE PERCENT: 1.6 % (ref 0–5)
GFR AFRICAN AMERICAN: >59
GFR NON-AFRICAN AMERICAN: >60
GLUCOSE BLD-MCNC: 118 MG/DL (ref 70–99)
GLUCOSE BLD-MCNC: 122 MG/DL (ref 70–99)
GLUCOSE BLD-MCNC: 83 MG/DL (ref 74–109)
GLUCOSE BLD-MCNC: 85 MG/DL (ref 70–99)
GLUCOSE BLD-MCNC: 98 MG/DL (ref 70–99)
HCT VFR BLD CALC: 35.6 % (ref 37–47)
HEMOGLOBIN: 11.3 G/DL (ref 12–16)
IMMATURE GRANULOCYTES #: 0 K/UL
LV EF: 55 %
LVEF MODALITY: NORMAL
LYMPHOCYTES ABSOLUTE: 2.5 K/UL (ref 1.1–4.5)
LYMPHOCYTES RELATIVE PERCENT: 24.6 % (ref 20–40)
MAGNESIUM: 1.7 MG/DL (ref 1.6–2.4)
MCH RBC QN AUTO: 28 PG (ref 27–31)
MCHC RBC AUTO-ENTMCNC: 31.7 G/DL (ref 33–37)
MCV RBC AUTO: 88.1 FL (ref 81–99)
MONOCYTES ABSOLUTE: 0.7 K/UL (ref 0–0.9)
MONOCYTES RELATIVE PERCENT: 6.9 % (ref 0–10)
NEUTROPHILS ABSOLUTE: 6.8 K/UL (ref 1.5–7.5)
NEUTROPHILS RELATIVE PERCENT: 66 % (ref 50–65)
PDW BLD-RTO: 12.3 % (ref 11.5–14.5)
PERFORMED ON: ABNORMAL
PERFORMED ON: ABNORMAL
PERFORMED ON: NORMAL
PERFORMED ON: NORMAL
PLATELET # BLD: 250 K/UL (ref 130–400)
PMV BLD AUTO: 9.9 FL (ref 9.4–12.3)
POTASSIUM SERPL-SCNC: 4 MMOL/L (ref 3.5–5)
RBC # BLD: 4.04 M/UL (ref 4.2–5.4)
SODIUM BLD-SCNC: 136 MMOL/L (ref 136–145)
TOTAL PROTEIN: 5.9 G/DL (ref 6.6–8.7)
WBC # BLD: 10.3 K/UL (ref 4.8–10.8)

## 2021-07-26 PROCEDURE — 2580000003 HC RX 258: Performed by: INTERNAL MEDICINE

## 2021-07-26 PROCEDURE — C8929 TTE W OR WO FOL WCON,DOPPLER: HCPCS

## 2021-07-26 PROCEDURE — 82947 ASSAY GLUCOSE BLOOD QUANT: CPT

## 2021-07-26 PROCEDURE — 2500000003 HC RX 250 WO HCPCS: Performed by: INTERNAL MEDICINE

## 2021-07-26 PROCEDURE — 6370000000 HC RX 637 (ALT 250 FOR IP): Performed by: INTERNAL MEDICINE

## 2021-07-26 PROCEDURE — 36415 COLL VENOUS BLD VENIPUNCTURE: CPT

## 2021-07-26 PROCEDURE — 2140000000 HC CCU INTERMEDIATE R&B

## 2021-07-26 PROCEDURE — 85025 COMPLETE CBC W/AUTO DIFF WBC: CPT

## 2021-07-26 PROCEDURE — 80053 COMPREHEN METABOLIC PANEL: CPT

## 2021-07-26 PROCEDURE — 83735 ASSAY OF MAGNESIUM: CPT

## 2021-07-26 PROCEDURE — 99233 SBSQ HOSP IP/OBS HIGH 50: CPT | Performed by: INTERNAL MEDICINE

## 2021-07-26 PROCEDURE — 6360000004 HC RX CONTRAST MEDICATION: Performed by: INTERNAL MEDICINE

## 2021-07-26 RX ADMIN — DEXTROSE MONOHYDRATE 12.5 G: 25 INJECTION, SOLUTION INTRAVENOUS at 11:49

## 2021-07-26 RX ADMIN — ACETAMINOPHEN 650 MG: 325 TABLET ORAL at 12:33

## 2021-07-26 RX ADMIN — PANTOPRAZOLE SODIUM 40 MG: 40 TABLET, DELAYED RELEASE ORAL at 06:36

## 2021-07-26 RX ADMIN — SODIUM CHLORIDE, PRESERVATIVE FREE 10 ML: 5 INJECTION INTRAVENOUS at 08:26

## 2021-07-26 RX ADMIN — SUCRALFATE 1 G: 1 TABLET ORAL at 20:00

## 2021-07-26 RX ADMIN — SODIUM CHLORIDE, PRESERVATIVE FREE 10 ML: 5 INJECTION INTRAVENOUS at 19:58

## 2021-07-26 RX ADMIN — LISINOPRIL 10 MG: 10 TABLET ORAL at 08:25

## 2021-07-26 RX ADMIN — CARVEDILOL 3.12 MG: 3.12 TABLET, FILM COATED ORAL at 08:25

## 2021-07-26 RX ADMIN — GLIPIZIDE 10 MG: 10 TABLET ORAL at 08:25

## 2021-07-26 RX ADMIN — PERFLUTREN 2.2 MG: 6.52 INJECTION, SUSPENSION INTRAVENOUS at 15:30

## 2021-07-26 RX ADMIN — ROSUVASTATIN 40 MG: 20 TABLET, FILM COATED ORAL at 19:58

## 2021-07-26 RX ADMIN — CARVEDILOL 3.12 MG: 3.12 TABLET, FILM COATED ORAL at 16:58

## 2021-07-26 RX ADMIN — LEVOTHYROXINE SODIUM 75 MCG: 75 TABLET ORAL at 06:36

## 2021-07-26 RX ADMIN — CLOPIDOGREL BISULFATE 75 MG: 75 TABLET ORAL at 08:25

## 2021-07-26 RX ADMIN — ASPIRIN 81 MG: 81 TABLET, CHEWABLE ORAL at 08:25

## 2021-07-26 ASSESSMENT — PAIN SCALES - GENERAL
PAINLEVEL_OUTOF10: 0
PAINLEVEL_OUTOF10: 3
PAINLEVEL_OUTOF10: 0
PAINLEVEL_OUTOF10: 0

## 2021-07-26 ASSESSMENT — PAIN DESCRIPTION - FREQUENCY: FREQUENCY: INTERMITTENT

## 2021-07-26 ASSESSMENT — PAIN DESCRIPTION - ORIENTATION: ORIENTATION: OTHER (COMMENT)

## 2021-07-26 ASSESSMENT — PAIN DESCRIPTION - DESCRIPTORS: DESCRIPTORS: ACHING

## 2021-07-26 ASSESSMENT — PAIN DESCRIPTION - PAIN TYPE: TYPE: ACUTE PAIN

## 2021-07-26 ASSESSMENT — PAIN DESCRIPTION - PROGRESSION: CLINICAL_PROGRESSION: NOT CHANGED

## 2021-07-26 ASSESSMENT — PAIN DESCRIPTION - ONSET: ONSET: GRADUAL

## 2021-07-26 ASSESSMENT — PAIN - FUNCTIONAL ASSESSMENT: PAIN_FUNCTIONAL_ASSESSMENT: ACTIVITIES ARE NOT PREVENTED

## 2021-07-26 ASSESSMENT — PAIN DESCRIPTION - LOCATION: LOCATION: HEAD

## 2021-07-26 NOTE — PLAN OF CARE
Problem: Falls - Risk of:  Goal: Will remain free from falls  Description: Will remain free from falls  Outcome: Not Met This Shift  Goal: Absence of physical injury  Description: Absence of physical injury  Outcome: Not Met This Shift     Problem: HEMODYNAMIC STATUS  Goal: Patient has stable vital signs and fluid balance  Outcome: Ongoing     Problem: FLUID AND ELECTROLYTE IMBALANCE  Goal: Fluid and electrolyte balance are achieved/maintained  Outcome: Ongoing     Problem: OXYGENATION/RESPIRATORY FUNCTION  Goal: Patient will maintain patent airway  Outcome: Ongoing  Goal: Patient will achieve/maintain normal respiratory rate/effort  Description: Respiratory rate and effort will be within normal limits for the patient  Outcome: Ongoing     Problem: ACTIVITY INTOLERANCE/IMPAIRED MOBILITY  Goal: Mobility/activity is maintained at optimum level for patient  Outcome: Ongoing     Problem: PSYCHOSOCIAL NEEDS  Goal: Demonstrates ability to cope with illness  Outcome: Ongoing     Problem: ACTIVITY INTOLERANCE/IMPAIRED MOBILITY  Goal: Mobility/activity is maintained at optimum level for patient  Outcome: Ongoing

## 2021-07-26 NOTE — PROGRESS NOTES
Hospitalist Progress Note  [facility]     Patient: Sharyn Jackson  : 1940  MRN: [id number]  Code Status: Juan Alberto Tim Day: 1   Date of Service: 2021    Subjective:   Patient seen and examined. Doing well. No current complaints. Family at bedside. All questions sought and answered. Past Medical History:   Diagnosis Date    Arthritis     Fernandes's esophagus     DDD (degenerative disc disease), lumbar     DM (diabetes mellitus), type 2 ([facility] [address]     Duodenal ulcer     E. coli sepsis ([facility] [address] 2014    Elevated lipids     Elevated pancreatic enzyme     Gastritis     GERD (gastroesophageal reflux disease)     Hiatal hernia     History of colon polyps     HTN (hypertension)     Hypercholesterolemia     Hypothyroidism     Nausea and vomiting     Osteoarthritis     other     Non Specified Ulcers    Pernicious anemia     Skin cancer     History OF.  Venous angioma     left-parietal       Past Surgical History:   Procedure Laterality Date    BREAST SURGERY      BG Biopsy    CHOLECYSTECTOMY      COLONOSCOPY  3/16/2005    DR. OTERO    COLONOSCOPY  2010    tubular adenoma    COLONOSCOPY  2015    Dr Miguel Burch, normal, 5 yr recall    FRACTURE SURGERY      x 2 right ankle    HAND SURGERY Right 2012    Dr Alice Peabody removed due to arthritis and tendon repair    HYSTERECTOMY, TOTAL ABDOMINAL  1969    KNEE SURGERY Right     LUMBAR [address] SURGERY  2008    L5-S1-Dr Brett Weston LUMBAR SPINE SURGERY  2005    OTHER SURGICAL HISTORY Right     Basa Cell Lesion Removal-elbow    OTHER SURGICAL HISTORY Right     ORIF ankle    NM EGD INTRMURAL NEEDLE ASPIR/BIOP ALTERED ANATOMY N/A 8/15/2018    Dr Kailey Pham    NM EGD TRANSORAL BIOPSY SINGLE/MULTIPLE N/A 2017    EGD BIOPSY performed by Deandra Melgoza DO at [facility] Right     ROTATOR CUFF REPAIR Left 2013    Dr Kitchen Dec ENDOSCOPY  2005    DR. KIM    UPPER GASTROINTESTINAL ENDOSCOPY  10/22/2009    peptic stricture, schatzki ring dilated 46 fr, sm hiatal hernia, gastritis    UPPER GASTROINTESTINAL ENDOSCOPY  10/01/2014    Dr Lott Early dilation, urease neg    UPPER GASTROINTESTINAL ENDOSCOPY N/A 2015    Dr Nathalia Friend, stricture/ulcers, repeat 8 wks    UPPER GASTROINTESTINAL ENDOSCOPY  2017    Dr Hauser-w/dilation over wire, [address] [place]-hiatal hernia, distal esophageal stricture    UPPER GASTROINTESTINAL ENDOSCOPY  8/15/2018    Dr SANYA Finley-w/dilation over wire-[address] and EUS-Possible duodenitis, gastritis, duodenal stricture likely peptic stricture nature, hiatal hernia    UPPER GASTROINTESTINAL ENDOSCOPY  8/15/2018    Dr SANYA Finley-w/dilation over wire-[address] and EUS-Possible duodenitis, gastritis, duodenal stricture likely peptic stricture nature, hiatal hernia       Family History   Problem Relation Age of Onset    Breast Cancer Mother     Colon Cancer Neg Hx     Colon Polyps Neg Hx     Esophageal Cancer Neg Hx     Liver Disease Neg Hx     Liver Cancer Neg Hx     Rectal Cancer Neg Hx     Stomach Cancer Neg Hx        Social History     Socioeconomic History    Marital status:      Spouse name: Not on file    Number of children: Not on file    Years of education: Not on file    Highest education level: Not on file   Occupational History    Not on file   Tobacco Use    Smoking status: Former Smoker     Packs/day: 0.50     Years: 10.00     Pack years: 5.00     Types: Cigarettes     Quit date: 1989     Years since quittin.8    Smokeless tobacco: Never Used   Vaping Use    Vaping Use: Never used   Substance and Sexual Activity    Alcohol use: Yes     Comment: glass of wine a couple times per month    Drug use: No    Sexual activity: Not on file   Other Topics Concern    Not on file   Social History Narrative    Not on file     Social Determinants of Health     Financial Resource Strain:     Difficulty of Paying Living Expenses:    Food Insecurity:     Worried About Running Out of Food in the Last Year:     [address] in the Last Year:    Transportation Needs:     Lack of Transportation (Medical):      Lack of Transportation (Non-Medical):    Physical Activity:     Days of Exercise per Week:     Minutes of Exercise per Session:    Stress:     Feeling of Stress :    Social Connections:     Frequency of Communication with Friends and Family:     Frequency of Social Gatherings with Friends and Family:     Attends Zoroastrian Services:     Active Member of Clubs or Organizations:     Attends Club or Organization Meetings:     Marital Status:    Intimate Partner Violence:     Fear of Current or Ex-Partner:     Emotionally Abused:     Physically Abused:     Sexually Abused:        Current Facility-Administered Medications   Medication Dose Route Frequency Provider Last Rate Last Admin    [Held by provider] glipiZIDE (GLUCOTROL) tablet 10 mg  10 mg Oral Daily Casi Murray MD   10 mg at 07/26/21 0825    levothyroxine (SYNTHROID) tablet 75 mcg  75 mcg Oral Daily Casi Murray MD   75 mcg at 07/26/21 0636    [Held by provider] metFORMIN (GLUCOPHAGE) tablet 1,000 mg  1,000 mg Oral BID WC Casi Murray MD        pantoprazole (PROTONIX) tablet 40 mg  40 mg Oral BID AC Casi Murary MD   40 mg at 07/26/21 0636    rosuvastatin (CRESTOR) tablet 40 mg  40 mg Oral Nightly Casi Murray MD   40 mg at 07/25/21 2037    sucralfate (CARAFATE) tablet 1 g  1 g Oral 4x Daily Casi Murray MD   1 g at 07/25/21 2037    sodium chloride flush 0.9 % injection 5-40 mL  5-40 mL Intravenous 2 times per day Casi Murray MD   10 mL at 07/26/21 0826    sodium chloride flush 0.9 % injection 5-40 mL  5-40 mL Intravenous PRN Casi Murray MD        0.9 % sodium chloride infusion  25 mL Intravenous PRN Casi Murray MD        acetaminophen (TYLENOL) tablet 650 mg  650 mg Oral Q4H PRN Casi Murray MD   650 mg at 07/26/21 1233    carvedilol (COREG) tablet 3.125 mg  3.125 mg Oral BID  Mark Rae MD   3.125 mg at 07/26/21 0825    lisinopril (PRINIVIL;ZESTRIL) tablet 10 mg  10 mg Oral Daily Mark Rae MD   10 mg at 07/26/21 0825    aspirin chewable tablet 81 mg  81 mg Oral Daily Mark Rae MD   81 mg at 07/26/21 0825    clopidogrel (PLAVIX) tablet 75 mg  75 mg Oral Daily Mark Rae MD   75 mg at 07/26/21 0825    norepinephrine (LEVOPHED) 16 mg in sodium chloride 0.9 % 250 mL infusion  2-100 mcg/min Intravenous Continuous Omkar Ernst MD   Stopped at 07/25/21 1200    glucose (GLUTOSE) 40 % oral gel 15 g  15 g Oral PRN Omkar Ernst MD        dextrose 50 % IV solution  12.5 g Intravenous PRN Omkar Ernst MD   12.5 g at 07/26/21 1149    glucagon (rDNA) injection 1 mg  1 mg Intramuscular PRN Omkar Ernst MD        dextrose 5 % solution  100 mL/hr Intravenous PRN Omkar Ernst MD        insulin lispro (HUMALOG) injection vial 0-6 Units  0-6 Units Subcutaneous TID  Omkar Ernst MD        insulin lispro (HUMALOG) injection vial 0-3 Units  0-3 Units Subcutaneous Nightly Omkar Ernst MD   1 Units at 07/25/21 2039    insulin glargine (LANTUS) injection vial 16 Units  16 Units Subcutaneous Nightly Omkar Ernst MD   16 Units at 07/25/21 2037    labetalol (NORMODYNE;TRANDATE) injection 10 mg  10 mg Intravenous Q4H PRN Omkar Ernst MD             sodium chloride      norepinephrine Stopped (07/25/21 1200)    dextrose          Objective:   BP (!) 94/50   Pulse 75   Temp 97.4 °F (36.3 °C) (Temporal)   Resp 15   Ht 5' 4.5\" (1.638 m)   Wt 172 lb 3.2 oz (78.1 kg)   SpO2 97%   BMI 29.10 kg/m²     General: no acute distress  HEENT: normocephalic, atraumatic  Neck: supple, symmetrical, trachea midline   Lungs: clear to auscultation bilaterally   Cardiovascular: s1 and s2 normal  Abdomen: soft, positive bowel sounds  Extremities: no edema or cyanosis   Neuro: aaox3, no focal deficits   Skin: normal color and texture    Recent Labs     07/25/21  0916 07/26/21  0314   WBC 8.0 10.3   RBC 4.71 4.04*   HGB 13.1 11.3*   HCT 41.8 35.6*   MCV 88.7 88.1   MCH 27.8 28.0   MCHC 31.3* 31.7*    250     Recent Labs     07/25/21  0916 07/26/21  0314    136   K 4.1 4.0   ANIONGAP 13 7    103   CO2 20* 26   BUN 19 12   CREATININE 0.9 0.9   GLUCOSE 188* 83   CALCIUM 9.2 9.0     Recent Labs     07/26/21  0314   MG 1.7     Recent Labs     07/25/21  0916 07/26/21  0314   AST 13 185*   ALT 13 48*   BILITOT 0.5 0.7   ALKPHOS 93 79     No results for input(s): PH, PO2, PCO2, HCO3, BE, O2SAT in the last 72 hours. Recent Labs     07/25/21  0916   TROPONINI <0.01     Recent Labs     07/25/21  0916   INR 0.90     No results for input(s): LACTA in the last 72 hours. Intake/Output Summary (Last 24 hours) at 7/26/2021 1556  Last data filed at 7/26/2021 1400  Gross per 24 hour   Intake 1950 ml   Output 2150 ml   Net -200 ml       XR CHEST PORTABLE    Result Date: 7/25/2021  XR CHEST PORTABLE 7/25/2021 9:26 AM HISTORY:   Chest pain  Single view. COMPARISONS:  2/22/2014 chest radiography FINDINGS: Calcified granuloma in the right lower lobe again appreciated. The lungs are clear without infiltrates. The heart is normal in size, pulmonary circulation appropriate, without heart failure. The bony structures are intact without acute osseous abnormality. . Radiographically, chest is unchanged. No acute cardiopulmonary process.  Signed by Dr Jarrell Edmond and Plan:   Acute inferior wall STEMI  Taken for emergent cath 7/25/2021 per Dr. Agnes Hummel  S/p mid RCA stenting  Meds per cardiology  Remains hemodynamically stable     DM2  Hold home meds given episode of hypoglycemia  SSI for now  HbA1c 6.5     DVT prophylaxis  Per primary    Total critical care time: 45 minutes    Prashant Avilez MD   7/26/2021 3:56 PM

## 2021-07-26 NOTE — CONSULTS
**Physician Signature**  This document was electronically signed by: Татьяна Michele MD  2021   10:18 PM    **Consult Information**  Member Facility: [address] [place] [address] MRN: [id number]  Visit/Encounter Number: [id number]  Consult ID: [id number]  Facility Time Zone: CT  Date and Time of Request: 2021 09:52 PM  CT  Requesting Clinician: Jeff Solorio MD  Patient Name: King Verito  YOB: 1940  Gender: Female  Patient identity was confirmed at the beginning of the consult with the   patient/family/staff using two personal identifiers: Patient name and       **Reason for Consult**  Reason for Consult: [facility]    **Admission**  Admission Date: 2021  Chief reason for ICU admission: STEMI    **Core Metrics**  General orienting sentence for patient:  PM: 78 yo woman from home,   presented with nausea, malaise-->STEMI-->100% RCA-->Stent  Chief physiologic deterioration: None - Stable patient  Is the patient on DVT prophylaxis?: No  DVT Prophylaxis Comments: Up to bathroom  Is the patient on GI prophylaxis?: Yes  Gi Prophylaxis Comments: Protonix  Has this patient reached their nutritional goal?: Yes  Are there current issues with pain management in this patient?:   No  Are there issues with skin integrity?: No  Skin Integrity Details: Radial puncture site clean  Are there issues with delirium?: No  Has the patient been mobilized?: Yes  Is this patient currently intubated?: No  Are there ethical or care philosophy or family issues?: No  Do you recommend an in depth evaluation?: No  Disposition Recommendations: Downgrade/transition to ICU    **Physician Signature**  This document was electronically signed by: Татьяна Michele MD  2021   10:18 PM

## 2021-07-27 ENCOUNTER — TELEPHONE (OUTPATIENT)
Dept: CARDIOLOGY CLINIC | Age: 81
End: 2021-07-27

## 2021-07-27 VITALS
OXYGEN SATURATION: 99 % | SYSTOLIC BLOOD PRESSURE: 112 MMHG | BODY MASS INDEX: 26.56 KG/M2 | DIASTOLIC BLOOD PRESSURE: 70 MMHG | WEIGHT: 159.4 LBS | TEMPERATURE: 97.5 F | RESPIRATION RATE: 18 BRPM | HEART RATE: 78 BPM | HEIGHT: 65 IN

## 2021-07-27 LAB
ALBUMIN SERPL-MCNC: 3.6 G/DL (ref 3.5–5.2)
ALP BLD-CCNC: 80 U/L (ref 35–104)
ALT SERPL-CCNC: 31 U/L (ref 5–33)
ANION GAP SERPL CALCULATED.3IONS-SCNC: 9 MMOL/L (ref 7–19)
AST SERPL-CCNC: 63 U/L (ref 5–32)
BASOPHILS ABSOLUTE: 0.1 K/UL (ref 0–0.2)
BASOPHILS RELATIVE PERCENT: 0.6 % (ref 0–1)
BILIRUB SERPL-MCNC: 0.6 MG/DL (ref 0.2–1.2)
BUN BLDV-MCNC: 14 MG/DL (ref 8–23)
CALCIUM SERPL-MCNC: 9 MG/DL (ref 8.8–10.2)
CHLORIDE BLD-SCNC: 109 MMOL/L (ref 98–111)
CO2: 23 MMOL/L (ref 22–29)
CREAT SERPL-MCNC: 1 MG/DL (ref 0.5–0.9)
EOSINOPHILS ABSOLUTE: 0.3 K/UL (ref 0–0.6)
EOSINOPHILS RELATIVE PERCENT: 3.5 % (ref 0–5)
GFR AFRICAN AMERICAN: >59
GFR NON-AFRICAN AMERICAN: 53
GLUCOSE BLD-MCNC: 138 MG/DL (ref 74–109)
HCT VFR BLD CALC: 34.1 % (ref 37–47)
HEMOGLOBIN: 10.9 G/DL (ref 12–16)
IMMATURE GRANULOCYTES #: 0 K/UL
LYMPHOCYTES ABSOLUTE: 2.4 K/UL (ref 1.1–4.5)
LYMPHOCYTES RELATIVE PERCENT: 27.1 % (ref 20–40)
MAGNESIUM: 2 MG/DL (ref 1.6–2.4)
MCH RBC QN AUTO: 28.2 PG (ref 27–31)
MCHC RBC AUTO-ENTMCNC: 32 G/DL (ref 33–37)
MCV RBC AUTO: 88.3 FL (ref 81–99)
MONOCYTES ABSOLUTE: 0.5 K/UL (ref 0–0.9)
MONOCYTES RELATIVE PERCENT: 5.9 % (ref 0–10)
NEUTROPHILS ABSOLUTE: 5.5 K/UL (ref 1.5–7.5)
NEUTROPHILS RELATIVE PERCENT: 62.4 % (ref 50–65)
PDW BLD-RTO: 12.4 % (ref 11.5–14.5)
PLATELET # BLD: 243 K/UL (ref 130–400)
PMV BLD AUTO: 10.3 FL (ref 9.4–12.3)
POTASSIUM SERPL-SCNC: 4.1 MMOL/L (ref 3.5–5)
RBC # BLD: 3.86 M/UL (ref 4.2–5.4)
SODIUM BLD-SCNC: 141 MMOL/L (ref 136–145)
TOTAL PROTEIN: 5.5 G/DL (ref 6.6–8.7)
WBC # BLD: 8.8 K/UL (ref 4.8–10.8)

## 2021-07-27 PROCEDURE — 83735 ASSAY OF MAGNESIUM: CPT

## 2021-07-27 PROCEDURE — 2580000003 HC RX 258: Performed by: INTERNAL MEDICINE

## 2021-07-27 PROCEDURE — 36415 COLL VENOUS BLD VENIPUNCTURE: CPT

## 2021-07-27 PROCEDURE — 85025 COMPLETE CBC W/AUTO DIFF WBC: CPT

## 2021-07-27 PROCEDURE — 6370000000 HC RX 637 (ALT 250 FOR IP): Performed by: INTERNAL MEDICINE

## 2021-07-27 PROCEDURE — 80053 COMPREHEN METABOLIC PANEL: CPT

## 2021-07-27 PROCEDURE — 99239 HOSP IP/OBS DSCHRG MGMT >30: CPT | Performed by: INTERNAL MEDICINE

## 2021-07-27 RX ORDER — CLOPIDOGREL BISULFATE 75 MG/1
75 TABLET ORAL DAILY
Qty: 90 TABLET | Refills: 1 | Status: ON HOLD | OUTPATIENT
Start: 2021-07-27 | End: 2022-10-16 | Stop reason: HOSPADM

## 2021-07-27 RX ORDER — ROSUVASTATIN CALCIUM 40 MG/1
40 TABLET, COATED ORAL DAILY
Qty: 90 TABLET | Refills: 1 | Status: CANCELLED | OUTPATIENT
Start: 2021-07-27

## 2021-07-27 RX ORDER — ROSUVASTATIN CALCIUM 40 MG/1
40 TABLET, COATED ORAL NIGHTLY
Qty: 30 TABLET | Refills: 3 | Status: SHIPPED | OUTPATIENT
Start: 2021-07-27 | End: 2021-12-10 | Stop reason: SDUPTHER

## 2021-07-27 RX ORDER — CARVEDILOL 3.12 MG/1
3.12 TABLET ORAL 2 TIMES DAILY WITH MEALS
Qty: 60 TABLET | Refills: 3 | Status: ON HOLD | OUTPATIENT
Start: 2021-07-27 | End: 2022-10-16 | Stop reason: HOSPADM

## 2021-07-27 RX ORDER — ASPIRIN 81 MG/1
81 TABLET ORAL DAILY
Qty: 90 TABLET | Refills: 1 | Status: SHIPPED | OUTPATIENT
Start: 2021-07-27

## 2021-07-27 RX ADMIN — PANTOPRAZOLE SODIUM 40 MG: 40 TABLET, DELAYED RELEASE ORAL at 05:18

## 2021-07-27 RX ADMIN — SUCRALFATE 1 G: 1 TABLET ORAL at 07:56

## 2021-07-27 RX ADMIN — LEVOTHYROXINE SODIUM 75 MCG: 75 TABLET ORAL at 05:18

## 2021-07-27 RX ADMIN — CLOPIDOGREL BISULFATE 75 MG: 75 TABLET ORAL at 07:56

## 2021-07-27 RX ADMIN — CARVEDILOL 3.12 MG: 3.12 TABLET, FILM COATED ORAL at 07:56

## 2021-07-27 RX ADMIN — LISINOPRIL 10 MG: 10 TABLET ORAL at 07:56

## 2021-07-27 RX ADMIN — ASPIRIN 81 MG: 81 TABLET, CHEWABLE ORAL at 07:56

## 2021-07-27 RX ADMIN — SODIUM CHLORIDE, PRESERVATIVE FREE 10 ML: 5 INJECTION INTRAVENOUS at 07:59

## 2021-07-27 NOTE — PROGRESS NOTES
buPROPion (WELLBUTRIN XL) 150 MG extended release tablet Take 150 mg by mouth every morning    Historical Provider, MD   levothyroxine (SYNTHROID) 75 MCG tablet Take 75 mcg by mouth Daily. Historical Provider, MD   MetFORMIN HCl (GLUCOPHAGE PO) Take 1,000 mg by mouth 2 times daily. Historical Provider, MD   LISINOPRIL PO Take 10 mg by mouth daily. Historical Provider, MD Renetta Benoit by provider] glipiZIDE  10 mg Oral Daily    levothyroxine  75 mcg Oral Daily    [Held by provider] metFORMIN  1,000 mg Oral BID WC    pantoprazole  40 mg Oral BID AC    rosuvastatin  40 mg Oral Nightly    sucralfate  1 g Oral 4x Daily    sodium chloride flush  5-40 mL Intravenous 2 times per day    carvedilol  3.125 mg Oral BID WC    lisinopril  10 mg Oral Daily    aspirin  81 mg Oral Daily    clopidogrel  75 mg Oral Daily    insulin lispro  0-6 Units Subcutaneous TID WC    insulin lispro  0-3 Units Subcutaneous Nightly    [Held by provider] insulin glargine  16 Units Subcutaneous Nightly       TELEMETRY: Sinus     Physical Exam:    Physical Exam  Vitals reviewed. Constitutional:       Appearance: Normal appearance. HENT:      Head: Normocephalic. Mouth/Throat:      Mouth: Mucous membranes are moist.   Cardiovascular:      Rate and Rhythm: Normal rate and regular rhythm. Pulses: Normal pulses. Heart sounds: Normal heart sounds. No murmur heard. Pulmonary:      Effort: Pulmonary effort is normal.      Breath sounds: Normal breath sounds. Abdominal:      General: Bowel sounds are normal.      Palpations: Abdomen is soft. Tenderness: There is no abdominal tenderness. Musculoskeletal:         General: Normal range of motion. Right lower leg: No edema. Left lower leg: No edema. Skin:     General: Skin is warm. Neurological:      General: No focal deficit present. Mental Status: She is alert and oriented to person, place, and time.    Psychiatric:         Mood and Affect: Mood normal.         Behavior: Behavior normal.         Lab Data:  CBC:   Recent Labs     07/25/21  0916 07/26/21 0314   WBC 8.0 10.3   HGB 13.1 11.3*   HCT 41.8 35.6*   MCV 88.7 88.1    250     BMP:   Recent Labs     07/25/21  0916 07/26/21 0314    136   K 4.1 4.0    103   CO2 20* 26   BUN 19 12   CREATININE 0.9 0.9     LIVER PROFILE:   Recent Labs     07/25/21  0916 07/26/21 0314   AST 13 185*   ALT 13 48*   LIPASE 86*  --    BILITOT 0.5 0.7   ALKPHOS 93 79     PT/INR:   Recent Labs     07/25/21 0916   PROTIME 12.4   INR 0.90     APTT:   Recent Labs     07/25/21 0916   APTT 30.3       CK, CKMB, Troponin:   Recent Labs     07/25/21 0916   TROPONINI <0.01       Last 3 BNP:  No results for input(s): BNP in the last 72 hours. IMAGING:  XR CHEST PORTABLE    Result Date: 7/25/2021  XR CHEST PORTABLE 7/25/2021 9:26 AM HISTORY:   Chest pain  Single view. COMPARISONS:  2/22/2014 chest radiography FINDINGS: Calcified granuloma in the right lower lobe again appreciated. The lungs are clear without infiltrates. The heart is normal in size, pulmonary circulation appropriate, without heart failure. The bony structures are intact without acute osseous abnormality. . Radiographically, chest is unchanged. No acute cardiopulmonary process. Signed by Dr Kaykay De    ====    DATE OF PROCEDURE: 7/25/2021   INDICATIONS:   This is a 79 y.o. female was admitted with 2-1/2 hours of   substernal chest pain.  EKG showed acute inferior wall myocardial   infarct     PROCEDURE:   1. Coronary Angiogram   2. Left heart catheterization   3. Left ventriculogram   4.  Stenting of the mid right coronary artery   ---------------      Assessment and Plan:    Acute inferior STEMI status post successful primary PCI with one OSBALDO to mid RCA, patient did well overnight with no further chest pain  She is hemodynamically stable this morning,  at bedside  Echo pending  Patient is stable and can be transferred to cardiology floor  Continue with current medical management including aspirin, Plavix, lisinopril, Crestor Coreg  Diabetic control    Plan for discharge tomorrow if no issues overnight, she will need follow-up with cardiology in 4 weeks    Discussed the plan of care with the patient and       Mirian Willams MD, MD 7/26/2021 7:54 PM      Mirian Willams MD, [facility] [place], [place]  Interventional Cardiologist, Endovascular Specialist   Medical Director, Rhonda Morris

## 2021-07-27 NOTE — DISCHARGE SUMMARY
Discharge Summary    Kirstie Tubbs  :  1940  MRN:  [id number]    Admit date:  2021  Discharge date:  2021    Admitting Physician:  Wesley Vargas MD    Advance Directive: Prior    Consults: Hospitalist service    Primary Care Physician:  Enrique Ochoa DO    Discharge Diagnoses: Active Problems:    Acute inferior myocardial infarction [facility])  Resolved Problems:    * No resolved hospital problems. *      Problem List:   Patient Active Problem List    Diagnosis Date Noted    Acute inferior myocardial infarction ([facility] [address] 2021     Priority: High    DDD (degenerative disc disease), lumbar      Priority: High    Hypercholesterolemia      Priority: High    DM (diabetes mellitus), type 2 ([facility] [address]      Priority: High    Elevated lipase     Duodenal stricture     Duodenitis     Esophageal stricture     S/P dilatation of esophageal stricture 2017    Non-intractable vomiting with nausea 2017    Midepigastric pain 2017    Generalized weakness 2017    History of gastric ulcer 2017    History of adenomatous polyp of colon 2015    Esophageal dysphagia 2014    Chronic GERD 2014    History of esophageal stricture 2014       Cardiology Specific Data:  Specialty Problems        Cardiology Problems    Acute inferior myocardial infarction [facility])        Hypercholesterolemia              Significant Diagnostic Studies:   XR CHEST PORTABLE    Result Date: 2021  XR CHEST PORTABLE 2021 9:26 AM HISTORY:   Chest pain  Single view. COMPARISONS:  2014 chest radiography FINDINGS: Calcified granuloma in the right lower lobe again appreciated. The lungs are clear without infiltrates. The heart is normal in size, pulmonary circulation appropriate, without heart failure. The bony structures are intact without acute osseous abnormality. . Radiographically, chest is unchanged. No acute cardiopulmonary process.  Signed by Dr Kevin Ch Ryan Leung    Echo 2d w doppler w color w contrast    Result Date: 7/26/2021  Transthoracic Echocardiography Report (TTE)  Demographics   Patient Name Levar Perez Date of Study         07/26/2021   MRN          [id number]          Gender                Female   Date of      1940      Room Number           TAVON-5683  Birth   Age          79 year(s)   Height:      64.5 inches     Referring Physician   Thor Avelar MD   Weight:      165 pounds      Sonographer           KVNG Castillo   BSA:         1.81 m^2        Interpreting          Thaddeus Nelson MD                               Physician   BMI:         27.88 kg/m^2  Procedure Type of Study   TTE procedure:ECHO 2D W/DOPPLER/COLOR/CONTRAST. Study Location: Portable Technical Quality: Adequate visualization Patient Status: Inpatient Contrast Medium: Definity. Amount - 3 ml BP: 113/49 mmHg Indications:Myocardial infarction. Conclusions   Summary  LV is normal in size with preserved LV systolic function. LV ejection  fraction estimated at 55%. Grade 1 diastolic dysfunction. RV is normal in size with normal systolic function. Mild left atrial enlargement. Normal right atrial size. Aortic valve appears trileaflet with thickened noncoronary leaflet but  preserved leaflet mobility. No significant stenosis or regurgitation. Mitral valve appears structurally normal with normal leaflet mobility. No  significant stenosis or regurgitation noted. Mild tricuspid regurgitation. No significant pericardial effusion. Signature   ----------------------------------------------------------------  Electronically signed by Leonila Nelson MD(Interpreting physician)  on 07/26/2021 08:47 PM  ----------------------------------------------------------------  Allergies   - Vancomycin.   - Other allergy:(Junuvia).  M-Mode Measurements (cm)   LVIDd: 4.04 cm                       LVIDs: 2.88 cm  IVSd: 1.02 cm  LVPWd: 1.07 cm                       AO Root Dimension: 1.9 cm  % Ejection Fraction: 56 %            LA: 3.7 cm                                       LVOT: 1.8 cm  Doppler Measurements:   AV Peak Velocity:153 cm/s            MV Peak E-Wave: 98.5 cm/s  AV Peak Gradient: 9.36 mmHg          MV Peak A-Wave: 121 cm/s  AV Mean Gradient: 6 mmHg             MV E/A Ratio: 0.81 %  AV Area (Continuity):1.64 cm^2       MV Peak Gradient: 3.88 mmHg  TR Velocity:204 cm/s                 MV P1/2t: 63 msec  TR Gradient:16.65 mmHg               MVA by PHT3.49 cm^2  Estimated RAP:5 mmHg  RVSP:22 mmHg        Pertinent Labs:   CBC:   No results for input(s): WBC, HGB, PLT in the last 72 hours. BMP:    No results for input(s): NA, K, CL, CO2, BUN, CREATININE, GLUCOSE in the last 72 hours. INR:   No results for input(s): INR in the last 72 hours. Lipids: No results for input(s): CHOL, HDL in the last 72 hours. Invalid input(s): LDLCALCU  ABGs:No results for input(s): PHART, XMB3JSV, PO2ART, SWH5STB, BEART, HGBAE, Q1OTHRIF, CARBOXHGBART, 02THERAPY in the last 72 hours. HgBA1c:    No results for input(s): LABA1C in the last 72 hours. Procedures:   Bar Stevens MD         DATE OF PROCEDURE: 7/25/2021   INDICATIONS:   This is a 79 y.o. female was admitted with 2-1/2 hours of   substernal chest pain.  EKG showed acute inferior wall myocardial   infarct     PROCEDURE:   1. Coronary Angiogram   2. Left heart catheterization   3.  Left ventriculogram   4.  Stenting of the mid right coronary artery       ANESTHESIA   Moderate sedation     DESCRIPTION OF PROCEDURE:   Ty Garces was brought to the cath lab in a fasting state   after informed consent was obtained. A pre procedural time out   occurred to identify correct patient and procedure. Patient was   prepped and draped in the usual sterile fashion. Right radial approach was used. Bleeding was stopped with TR band   Routine use of diagnostic catheters were used for angiogram and   different projections.      FINDINGS: HEMODYNAMICS:   LVEDP: 20 mmHg   BP: 120/70   HR: 85/min   There was no gradient seen across the aortic valve.  Ejection   fraction was 45%     Left ventriculogram was performed: Left ventriculogram was   performed in the right anterior oblique projection.  Left   ventricle was normal in size.  There was akinesis of the inferior   and apical region     Comments: Moderately elevated left ventricular end-diastolic   pressure, ejection fraction was mildly decreased     CORONARY ANGIOGRAPHY:   Dominance: Right   Left Main: Normal   LAD: Left anterior descending was normal in caliber just reached   the apex.  Left anterior descending was tortuous.  There was a   80% short segment stenosis after the second diagonal branch.     Diagonal branches were small tortuous and free of significant   disease   LCx: Left circumflex was normal size vessel.  The supply to   marginal branches.  There were normal   RCA: Right coronary artery was occluded at its midportion.  After   PCI, the mid and distal right coronary artery was large in   caliber.  The mid posterolateral branch had a narrowing of 50% of   a short segment.  Otherwise it was free of significant disease     Interventional procedure: Stenting of the mid right coronary   artery   Guiding catheter used: 6 Serbian right Abhishek 4 guidewire used:   Run-through balloon used: Trek balloon: 2.0x15 stent used:   Resolute [place]: 4.0x22   Procedures in detail: The guidewire had no difficulty passing   down the distal right coronary artery through the occluded mid   right coronary artery.  The balloon was used to dilate the   occlusion at 20 stacy.  The stent was then deployed at 18 stacy.     Residual stenosis was 0%.  BEV-3 flow was reestablished         FLUOROSCOPY TIME as stated in the chart   CONTRAST VOLUME 130 cc.    ESTIMATED BLOOD LOSS: Minimal   COMPLICATIONS: None             CONCLUSIONS:   #1 successful stenting of the mid right coronary artery.  BEV   flow: Pre-PCI was 0, post PCI 3.  Stenosis: Pre-PCI was 100%,   post PCI was 0%.  Type C lesion   #2 moderately impaired left ventricular systolic wall motion   #3 moderate disease of the mid left anterior descending and mid   posterolateral branch        RECOMMENDATIONS:    patient will be treated with aspirin, P2 Y 12, high intensity   statin, beta-blocker, ACE inhibitor.  Metformin will be withheld.    SGLT2 inhibitor may be considered. Venkat Pierre will be asked to lose weight.   -------------------------    Physical Exam:    Vital Signs: /70   Pulse 78   Temp 97.5 °F (36.4 °C) (Temporal)   Resp 18   Ht 5' 4.5\" (1.638 m)   Wt 159 lb 6.4 oz (72.3 kg)   SpO2 99%   BMI 26.94 kg/m²     Physical Exam  Vitals reviewed. Constitutional:       Appearance: Normal appearance. HENT:      Head: Normocephalic. Mouth/Throat:      Mouth: Mucous membranes are moist.   Cardiovascular:      Rate and Rhythm: Normal rate and regular rhythm. Pulses: Normal pulses. Heart sounds: Normal heart sounds. No murmur heard. Pulmonary:      Effort: Pulmonary effort is normal.      Breath sounds: Normal breath sounds. Abdominal:      General: Bowel sounds are normal.      Palpations: Abdomen is soft. Tenderness: There is no abdominal tenderness. Musculoskeletal:         General: Normal range of motion. Right lower leg: No edema. Left lower leg: No edema. Skin:     General: Skin is warm. Neurological:      General: No focal deficit present. Mental Status: She is alert and oriented to person, place, and time.    Psychiatric:         Mood and Affect: Mood normal.         Behavior: Behavior normal.           Discharge Medications:       Nataliya Monteroilir   Home Medication Instructions BRM:[id number]    Printed on:08/02/21 0915   Medication Information                      aspirin EC 81 MG EC tablet  Take 1 tablet by mouth daily             buPROPion (WELLBUTRIN XL) 150 MG extended release tablet  Take 150 mg by mouth every morning             carvedilol (COREG) 3.125 MG tablet  Take 1 tablet by mouth 2 times daily (with meals)             celecoxib (CELEBREX) 200 MG capsule  Take 200 mg by mouth daily             clopidogrel (PLAVIX) 75 MG tablet  Take 1 tablet by mouth daily             insulin glargine (LANTUS) 100 UNIT/ML injection vial  Inject 16 Units into the skin nightly             levothyroxine (SYNTHROID) 75 MCG tablet  Take 75 mcg by mouth Daily. LISINOPRIL PO  Take 10 mg by mouth daily. MetFORMIN HCl (GLUCOPHAGE PO)  Take 1,000 mg by mouth 2 times daily. rosuvastatin (CRESTOR) 40 MG tablet  Take 1 tablet by mouth nightly                 Discharge Instructions:   Gideon Brunner, DO  [facility]  [id number]    On 7/29/2021  hospital follow up @ [address] [place] [address] [address] [address]  [phone]    Go in 6 weeks  office will call you with appointment        Take medications as directed. Resume activity as tolerated.     Diet: No diet orders on file     Disposition: Patient is medically stable and will be discharged *    Electronically signed by Javan Louis MD on 8/2/2021 at 9:40 PM    More than 30 minutes used to document and evaluate patient during discharge process      Javan Louis MD, [facility] [place], [place]  Interventional Cardiologist, Endovascular Specialist   Medical Director, Rhonda Morris

## 2021-07-28 ENCOUNTER — CARE COORDINATION (OUTPATIENT)
Dept: CASE MANAGEMENT | Age: 81
End: 2021-07-28

## 2021-07-28 NOTE — CONSULTS
Cardiac Rehab MI/PTCA/Stent education packet was sent to the patient's address on record. Handouts included were titled; \"Home Instructions Following a Cardiac Event\", \"Cardiac Home Exercise Program - Phase I\", \"Risk Factors for Heart Disease and Stroke\" and \"Cardiac Diet/Low Cholesterol\". Patient was instructed to contact [address] [place] [address] or the hospital nearest their residence for the opportunity to enroll in Phase II Outpatient Cardiac Rehab.

## 2021-07-29 ENCOUNTER — CARE COORDINATION (OUTPATIENT)
Dept: CASE MANAGEMENT | Age: 81
End: 2021-07-29

## 2021-09-10 ENCOUNTER — OFFICE VISIT (OUTPATIENT)
Dept: CARDIOLOGY CLINIC | Age: 81
End: 2021-09-10
Payer: MEDICARE

## 2021-09-10 VITALS
HEART RATE: 67 BPM | DIASTOLIC BLOOD PRESSURE: 70 MMHG | HEIGHT: 64 IN | SYSTOLIC BLOOD PRESSURE: 138 MMHG | BODY MASS INDEX: 27.14 KG/M2 | OXYGEN SATURATION: 98 % | WEIGHT: 159 LBS

## 2021-09-10 DIAGNOSIS — I10 ESSENTIAL HYPERTENSION: Primary | ICD-10-CM

## 2021-09-10 DIAGNOSIS — R53.83 FATIGUE, UNSPECIFIED TYPE: ICD-10-CM

## 2021-09-10 DIAGNOSIS — E55.9 VITAMIN D DEFICIENCY, UNSPECIFIED: ICD-10-CM

## 2021-09-10 DIAGNOSIS — I25.10 CORONARY ARTERY DISEASE INVOLVING NATIVE CORONARY ARTERY OF NATIVE HEART WITHOUT ANGINA PECTORIS: ICD-10-CM

## 2021-09-10 DIAGNOSIS — E78.5 HYPERLIPIDEMIA, UNSPECIFIED HYPERLIPIDEMIA TYPE: ICD-10-CM

## 2021-09-10 PROCEDURE — 93000 ELECTROCARDIOGRAM COMPLETE: CPT | Performed by: NURSE PRACTITIONER

## 2021-09-10 PROCEDURE — G8400 PT W/DXA NO RESULTS DOC: HCPCS | Performed by: NURSE PRACTITIONER

## 2021-09-10 PROCEDURE — 4040F PNEUMOC VAC/ADMIN/RCVD: CPT | Performed by: NURSE PRACTITIONER

## 2021-09-10 PROCEDURE — G8427 DOCREV CUR MEDS BY ELIG CLIN: HCPCS | Performed by: NURSE PRACTITIONER

## 2021-09-10 PROCEDURE — 1123F ACP DISCUSS/DSCN MKR DOCD: CPT | Performed by: NURSE PRACTITIONER

## 2021-09-10 PROCEDURE — 1036F TOBACCO NON-USER: CPT | Performed by: NURSE PRACTITIONER

## 2021-09-10 PROCEDURE — 1090F PRES/ABSN URINE INCON ASSESS: CPT | Performed by: NURSE PRACTITIONER

## 2021-09-10 PROCEDURE — 99214 OFFICE O/P EST MOD 30 MIN: CPT | Performed by: NURSE PRACTITIONER

## 2021-09-10 PROCEDURE — G8417 CALC BMI ABV UP PARAM F/U: HCPCS | Performed by: NURSE PRACTITIONER

## 2021-09-10 ASSESSMENT — ENCOUNTER SYMPTOMS
WHEEZING: 0
CHEST TIGHTNESS: 0
COUGH: 0
SHORTNESS OF BREATH: 0
SORE THROAT: 0

## 2021-09-10 NOTE — PROGRESS NOTES
[facility]  [address] [facility]  [phone]      Chief Complaint / Reason for Being Seen: Hospital follow-up    1. Essential hypertension    2. Coronary artery disease involving native coronary artery of native heart without angina pectoris    3. Hyperlipidemia, unspecified hyperlipidemia type    4. Fatigue, unspecified type    5. Vitamin D deficiency, unspecified          Patient presents to clinic today for hospital follow-up. Patient was admitted to the hospital on 7/25/2021 with complaints of substernal chest pain with radiation. EKG showed acute inferior wall MI. She was taken to the cardiac catheterization lab and underwent drug-eluting stent to the mid RCA. Patient was placed on aspirin, Plavix, statin, beta-blocker and ACE inhibitor. Patient presents to clinic today with complaints of chest pain, pressure or tightness. There is no shortness of breath, orthopnea or PND. Patient denies any lightheadedness, dizziness or syncope. Patient states her main complaint is fatigue. Subjective: Old records have been obtained from the referring providers. Those records have been reviewed and summarized.       Arturo Jameson is a 79 y.o. female with the following history as [facility] in [facility]:  Patient Active Problem List    Diagnosis Date Noted    Acute inferior myocardial infarction ([facility] [address] 07/25/2021    DDD (degenerative disc disease), lumbar     Hypercholesterolemia     DM (diabetes mellitus), type 2 (HCC)     Elevated lipase     Duodenal stricture     Duodenitis     Esophageal stricture     S/P dilatation of esophageal stricture 12/26/2017    Non-intractable vomiting with nausea 11/20/2017    Midepigastric pain 11/20/2017    Generalized weakness 11/20/2017    History of gastric ulcer 11/20/2017    History of adenomatous polyp of colon 11/12/2015    Esophageal dysphagia 09/16/2014    Chronic GERD 09/16/2014    History of esophageal stricture 09/16/2014     Current Outpatient Medications   Medication Sig Dispense Refill    aspirin EC 81 MG EC tablet Take 1 tablet by mouth daily 90 tablet 1    clopidogrel (PLAVIX) 75 MG tablet Take 1 tablet by mouth daily 90 tablet 1    carvedilol (COREG) 3.125 MG tablet Take 1 tablet by mouth 2 times daily (with meals) 60 tablet 3    rosuvastatin (CRESTOR) 40 MG tablet Take 1 tablet by mouth nightly 30 tablet 3    insulin glargine (LANTUS) 100 UNIT/ML injection vial Inject 16 Units into the skin nightly      celecoxib (CELEBREX) 200 MG capsule Take 200 mg by mouth daily      buPROPion (WELLBUTRIN XL) 150 MG extended release tablet Take 150 mg by mouth every morning      levothyroxine (SYNTHROID) 75 MCG tablet Take 75 mcg by mouth Daily.  MetFORMIN HCl (GLUCOPHAGE PO) Take 1,000 mg by mouth 2 times daily.  LISINOPRIL PO Take 10 mg by mouth daily. No current facility-administered medications for this visit. Allergies: Vancomycin and Januvia [sitagliptin]  Past Medical History:   Diagnosis Date    Arthritis     Fernandes's esophagus     DDD (degenerative disc disease), lumbar     DM [facility]diabetes mellitus), type 2 ([facility] [address]     Duodenal ulcer     E. coli sepsis ([facility] [address] 02/2014    Elevated lipids     Elevated pancreatic enzyme     Gastritis     GERD (gastroesophageal reflux disease)     Hiatal hernia     History of colon polyps     HTN (hypertension)     Hypercholesterolemia     Hypothyroidism     Nausea and vomiting     Osteoarthritis     other     Non Specified Ulcers    Pernicious anemia     Skin cancer     History OF.  Venous angioma     left-parietal     Past Surgical History:   Procedure Laterality Date    BREAST SURGERY      BG Biopsy    CHOLECYSTECTOMY      COLONOSCOPY  3/16/2005    DR. OTERO    COLONOSCOPY  5/21/2010    tubular adenoma    COLONOSCOPY  12/31/2015    Dr Evelyn Martin, normal, 5 yr recall    FRACTURE SURGERY      x 2 right ankle    HAND SURGERY Right 07/2012 Dr Nicole De Dios removed due to arthritis and tendon repair    HYSTERECTOMY, TOTAL ABDOMINAL  1969    KNEE SURGERY Right     LUMBAR One [address] SURGERY  11/2008    L5-S1-Dr Josie Carrizales LUMBAR SPINE SURGERY  2005    OTHER SURGICAL HISTORY Right     Basa Cell Lesion Removal-elbow    OTHER SURGICAL HISTORY Right 2002    ORIF ankle    MI EGD INTRMURAL NEEDLE ASPIR/BIOP ALTERED ANATOMY N/A 8/15/2018    Dr Lola Burger EGD TRANSORAL BIOPSY SINGLE/MULTIPLE N/A 12/6/2017    [facility] Right     ROTATOR CUFF REPAIR Left 12/2013    Dr Steve Herrmann  5/19/2005    DR. KIM    UPPER GASTROINTESTINAL ENDOSCOPY  10/22/2009    peptic stricture, schatzki ring dilated 46 fr, sm hiatal hernia, gastritis    UPPER GASTROINTESTINAL ENDOSCOPY  10/01/2014    Dr Armani Ramirez dilation, urease neg    UPPER GASTROINTESTINAL ENDOSCOPY N/A 12/18/2015    Dr Edith Trimbleign, stricture/ulcers, repeat 8 wks    UPPER GASTROINTESTINAL ENDOSCOPY  12/6/2017    Dr Hauser-w/dilation over wire, 46 Spanish-hiatal hernia, distal esophageal stricture    UPPER GASTROINTESTINAL ENDOSCOPY  8/15/2018    Dr SANYA Finley-joey/dilation over wire-[address] and EUS-Possible duodenitis, gastritis, duodenal stricture likely peptic stricture nature, hiatal hernia    UPPER GASTROINTESTINAL ENDOSCOPY  8/15/2018    Dr SANYA Finley-joey/dilation over wire-[address] and EUS-Possible duodenitis, gastritis, duodenal stricture likely peptic stricture nature, hiatal hernia     Family History   Problem Relation Age of Onset    Breast Cancer Mother     Colon Cancer Neg Hx     Colon Polyps Neg Hx     Esophageal Cancer Neg Hx     Liver Disease Neg Hx     Liver Cancer Neg Hx     Rectal Cancer Neg Hx     Stomach Cancer Neg Hx      Social History     Tobacco Use    Smoking status: Former Smoker     Packs/day: 0.50     Years: 10.00     Pack years: 5.00     Types:

## 2021-10-07 ENCOUNTER — TRANSCRIBE ORDERS (OUTPATIENT)
Dept: ADMINISTRATIVE | Facility: HOSPITAL | Age: 81
End: 2021-10-07

## 2021-10-07 DIAGNOSIS — R74.8 ABNORMAL LEVELS OF OTHER SERUM ENZYMES: Primary | ICD-10-CM

## 2021-10-08 ENCOUNTER — HOSPITAL ENCOUNTER (OUTPATIENT)
Dept: CT IMAGING | Facility: HOSPITAL | Age: 81
Discharge: HOME | End: 2021-10-08
Admitting: FAMILY MEDICINE

## 2021-10-08 DIAGNOSIS — R74.8 ABNORMAL LEVELS OF OTHER SERUM ENZYMES: ICD-10-CM

## 2021-10-08 LAB — CREAT BLDA-MCNC: 1.2 MG/DL (ref 0.6–1.3)

## 2021-10-08 PROCEDURE — 82565 ASSAY OF CREATININE: CPT

## 2021-10-08 PROCEDURE — 25010000002 IOPAMIDOL 61 % SOLUTION: Performed by: FAMILY MEDICINE

## 2021-10-08 PROCEDURE — 74170 CT ABD WO CNTRST FLWD CNTRST: CPT

## 2021-10-08 RX ADMIN — IOPAMIDOL 100 ML: 612 INJECTION, SOLUTION INTRAVENOUS at 14:57

## 2021-10-12 ENCOUNTER — APPOINTMENT (OUTPATIENT)
Dept: CT IMAGING | Facility: HOSPITAL | Age: 81
End: 2021-10-12

## 2021-10-28 ENCOUNTER — OFFICE VISIT (OUTPATIENT)
Dept: GASTROENTEROLOGY | Age: 81
End: 2021-10-28
Payer: MEDICARE

## 2021-10-28 VITALS
HEART RATE: 74 BPM | DIASTOLIC BLOOD PRESSURE: 70 MMHG | WEIGHT: 167 LBS | HEIGHT: 65 IN | OXYGEN SATURATION: 100 % | SYSTOLIC BLOOD PRESSURE: 139 MMHG | BODY MASS INDEX: 27.82 KG/M2

## 2021-10-28 DIAGNOSIS — K22.89 ESOPHAGEAL THICKENING: Primary | ICD-10-CM

## 2021-10-28 DIAGNOSIS — K21.9 CHRONIC GERD: ICD-10-CM

## 2021-10-28 DIAGNOSIS — Z79.01 CURRENT USE OF ANTICOAGULANT THERAPY: ICD-10-CM

## 2021-10-28 PROCEDURE — 4040F PNEUMOC VAC/ADMIN/RCVD: CPT | Performed by: NURSE PRACTITIONER

## 2021-10-28 PROCEDURE — 1036F TOBACCO NON-USER: CPT | Performed by: NURSE PRACTITIONER

## 2021-10-28 PROCEDURE — 99214 OFFICE O/P EST MOD 30 MIN: CPT | Performed by: NURSE PRACTITIONER

## 2021-10-28 PROCEDURE — G8427 DOCREV CUR MEDS BY ELIG CLIN: HCPCS | Performed by: NURSE PRACTITIONER

## 2021-10-28 PROCEDURE — 1090F PRES/ABSN URINE INCON ASSESS: CPT | Performed by: NURSE PRACTITIONER

## 2021-10-28 PROCEDURE — G8400 PT W/DXA NO RESULTS DOC: HCPCS | Performed by: NURSE PRACTITIONER

## 2021-10-28 PROCEDURE — 1123F ACP DISCUSS/DSCN MKR DOCD: CPT | Performed by: NURSE PRACTITIONER

## 2021-10-28 PROCEDURE — G8417 CALC BMI ABV UP PARAM F/U: HCPCS | Performed by: NURSE PRACTITIONER

## 2021-10-28 PROCEDURE — G8484 FLU IMMUNIZE NO ADMIN: HCPCS | Performed by: NURSE PRACTITIONER

## 2021-10-28 RX ORDER — PANTOPRAZOLE SODIUM 40 MG/1
40 TABLET, DELAYED RELEASE ORAL DAILY
COMMUNITY

## 2021-10-28 ASSESSMENT — ENCOUNTER SYMPTOMS
ABDOMINAL DISTENTION: 0
BLOOD IN STOOL: 0
NAUSEA: 0
CHOKING: 0
ANAL BLEEDING: 0
DIARRHEA: 0
VOMITING: 0
COUGH: 0
ABDOMINAL PAIN: 0
SHORTNESS OF BREATH: 0
RECTAL PAIN: 0
CONSTIPATION: 0
TROUBLE SWALLOWING: 1

## 2021-10-28 NOTE — PATIENT INSTRUCTIONS

## 2021-10-28 NOTE — PROGRESS NOTES
GENITOURINARY:   No hydronephrosis, urolithiasis or solid renal lesion. Bilateral   extrarenal pelves. Pelvis outside the field-of-view. PERITONEUM: No free air or ascites. GI TRACT: Thickening of the distal esophagus. Small hiatal hernia. Normal CT appearance of the stomach. Normal C-sweep of the duodenum. 2 cm proximal duodenal diverticulum. No abnormally dilated loops of bowel or bowel wall thickening.  Appendix not discretely identified. VESSELS: Aorta normal in course and caliber. Main branch vessels appear patent. Calcified and noncalcified atherosclerosis. Bilateral renal veins patent. RETROPERITONEUM: No mass, lymphadenopathy or hemorrhage. SOFT TISSUES: Fat-containing umbilical hernia. BONES: No acute or aggressive bony lesion. Degenerative changes of the spine. IMPRESSION:   1. Thickening of the distal esophagus, which could be seen with reflux   esophagitis. Correlate with patient's symptoms and consider direct   visualization. 2. Fatty liver. 3. Coronary artery calcification versus stent. This report was finalized on 10/08/2021 16:28 by Dr Randolph Echevarria MD.     Date: 10/08/21   Received From: [facility]        Assessment:     1. Esophageal thickening    2. Chronic GERD    3. Current use of anticoagulant therapy            Plan:   - Continue PPI BID  - Due to recent MI and Plavix use, pt not candidate for EGD, will plan for Esophagram at this time to rule out any large mass or obstruction   - Call with any questions or concerns  - She is due for EGD for Fernandes's surveillance  - Plan pending Esophagram       Orders  Orders Placed This Encounter   Procedures    FL ESOPHAGRAM     Standing Status:   Future     Standing Expiration Date:   10/28/2022     Order Specific Question:   Reason for exam:     Answer:   abnormal CT, hx Fernandes's esophagus     Medications  No orders of the defined types were placed in this encounter.         Patient History:     Past Medical History:   Diagnosis Date    Arthritis     Fernandes's esophagus     DDD (degenerative disc disease), lumbar     DM [facility]diabetes mellitus[facility] type 2 ([facility] [address]     Duodenal ulcer     E. coli sepsis ([facility] [address] 02/2014    Elevated lipids     Elevated pancreatic enzyme     Gastritis     GERD (gastroesophageal reflux disease)     Hiatal hernia     History of colon polyps     HTN (hypertension)     Hypercholesterolemia     Hypothyroidism     Nausea and vomiting     Osteoarthritis     other     Non Specified Ulcers    Pernicious anemia     Skin cancer     History OF.  Venous angioma     left-parietal       Past Surgical History:   Procedure Laterality Date    BREAST SURGERY      BG Biopsy    CHOLECYSTECTOMY      COLONOSCOPY  3/16/2005    DR. OTERO    COLONOSCOPY  5/21/2010    tubular adenoma    COLONOSCOPY  12/31/2015    Dr Fide Jacobo, normal, 5 yr recall    FRACTURE SURGERY      x 2 right ankle    HAND SURGERY Right 07/2012    Dr Swati Carrillo removed due to arthritis and tendon repair    HYSTERECTOMY, TOTAL ABDOMINAL  1969    KNEE SURGERY Right     LUMBAR [address] SURGERY  11/2008    L5-S1-Dr Kathryn Veloz LUMBAR SPINE SURGERY  2005    OTHER SURGICAL HISTORY Right     Basa Cell Lesion Removal-elbow    OTHER SURGICAL HISTORY Right 2002    ORIF ankle    [place] EGD INTRMURAL NEEDLE ASPIR/BIOP ALTERED ANATOMY N/A 8/15/2018    Dr Margaret Mireles EGD TRANSORAL BIOPSY SINGLE/MULTIPLE N/A 12/6/2017    EGD BIOPSY performed by Juanito Plaza DO at [address] Right     ROTATOR CUFF REPAIR Left 12/2013    Dr Norma Cunningham  5/19/2005    DR. KIM    UPPER GASTROINTESTINAL ENDOSCOPY  10/22/2009    peptic stricture, schatzki ring dilated 46 fr, sm hiatal hernia, gastritis    UPPER GASTROINTESTINAL ENDOSCOPY  10/01/2014    Dr Hauser-empiric dilation, urease neg    UPPER GASTROINTESTINAL ENDOSCOPY N/A 12/18/2015    Dr Fide Jacobo, stricture/ulcers, repeat 8 wks    UPPER GASTROINTESTINAL ENDOSCOPY  2017    Dr Hauser-w/dilation over wire, 46 Kh[place]-hiatal hernia, distal esophageal stricture    UPPER GASTROINTESTINAL ENDOSCOPY  8/15/2018    Dr SANYA Finley-w/dilation over wire-[address] and EUS-Possible duodenitis, gastritis, duodenal stricture likely peptic stricture nature, hiatal hernia    UPPER GASTROINTESTINAL ENDOSCOPY  8/15/2018    Dr SANYA Finley[facility]-Possible duodenitis, gastritis, duodenal stricture likely peptic stricture nature, hiatal hernia       Family History   Problem Relation Age of Onset    Breast Cancer Mother     Colon Cancer Neg Hx     Colon Polyps Neg Hx     Esophageal Cancer Neg Hx     Liver Disease Neg Hx     Liver Cancer Neg Hx     Rectal Cancer Neg Hx     Stomach Cancer Neg Hx        Social History     Socioeconomic History    Marital status:      Spouse name: None    Number of children: None    Years of education: None    Highest education level: None   Occupational History    None   Tobacco Use    Smoking status: Former Smoker     Packs/day: 0.50     Years: 10.00     Pack years: 5.00     Types: Cigarettes     Quit date: 1989     Years since quittin.1    Smokeless tobacco: Never Used   Vaping Use    Vaping Use: Never used   Substance and Sexual Activity    Alcohol use: Yes     Comment: glass of wine a couple times per month    Drug use: No    Sexual activity: None   Other Topics Concern    None   Social History Narrative    None     Social Determinants of Health     Financial Resource Strain:     Difficulty of Paying Living Expenses:    Food Insecurity:     Worried About Running Out of Food in the Last Year:     Ran Out of Food in the Last Year:    Transportation Needs:     Lack of Transportation (Medical):      Lack of Transportation (Non-Medical):    Physical Activity:     Days of Exercise per Week:     Minutes of Exercise per Session:    Stress:     Feeling of Stress :    Social Connections:     Frequency of Communication with Friends and Family:     Frequency of Social Gatherings with Friends and Family:     Attends Taoist Services:     Active Member of Clubs or Organizations:     Attends Club or Organization Meetings:     Marital Status:    Intimate Partner Violence:     Fear of Current or Ex-Partner:     Emotionally Abused:     Physically Abused:     Sexually Abused:        Current Outpatient Medications   Medication Sig Dispense Refill    pantoprazole (PROTONIX) 40 MG tablet Take 40 mg by mouth daily      aspirin EC 81 MG EC tablet Take 1 tablet by mouth daily 90 tablet 1    clopidogrel (PLAVIX) 75 MG tablet Take 1 tablet by mouth daily 90 tablet 1    carvedilol (COREG) 3.125 MG tablet Take 1 tablet by mouth 2 times daily (with meals) 60 tablet 3    rosuvastatin (CRESTOR) 40 MG tablet Take 1 tablet by mouth nightly 30 tablet 3    insulin glargine (LANTUS) 100 UNIT/ML injection vial Inject 16 Units into the skin nightly      celecoxib (CELEBREX) 200 MG capsule Take 200 mg by mouth daily      buPROPion (WELLBUTRIN XL) 150 MG extended release tablet Take 150 mg by mouth every morning      levothyroxine (SYNTHROID) 75 MCG tablet Take 75 mcg by mouth Daily.  MetFORMIN HCl (GLUCOPHAGE PO) Take 1,000 mg by mouth daily       LISINOPRIL PO Take 10 mg by mouth daily. No current facility-administered medications for this visit. Allergies   Allergen Reactions    Januvia [Sitagliptin] Nausea And Vomiting    Vancomycin Itching     Red itchy scalp       Review of Systems   Constitutional: Negative for activity change, appetite change, fatigue, fever and unexpected weight change. HENT: Positive for trouble swallowing. Respiratory: Negative for cough, choking and shortness of breath. Cardiovascular: Negative for chest pain.    Gastrointestinal: Negative for abdominal distention, abdominal pain, anal bleeding, blood in stool,

## 2021-11-04 ENCOUNTER — HOSPITAL ENCOUNTER (OUTPATIENT)
Dept: ULTRASOUND IMAGING | Age: 81
Discharge: HOME | End: 2021-11-04
Payer: MEDICARE

## 2021-11-04 ENCOUNTER — HOSPITAL ENCOUNTER (OUTPATIENT)
Dept: GENERAL RADIOLOGY | Age: 81
Discharge: HOME | End: 2021-11-04
Payer: MEDICARE

## 2021-11-04 DIAGNOSIS — R10.2 PELVIC AND PERINEAL PAIN: ICD-10-CM

## 2021-11-04 DIAGNOSIS — K22.89 ESOPHAGEAL THICKENING: ICD-10-CM

## 2021-11-04 PROCEDURE — 76857 US EXAM PELVIC LIMITED: CPT

## 2021-11-04 PROCEDURE — 74220 X-RAY XM ESOPHAGUS 1CNTRST: CPT

## 2021-11-09 ENCOUNTER — TELEPHONE (OUTPATIENT)
Dept: GASTROENTEROLOGY | Age: 81
End: 2021-11-09

## 2021-11-09 NOTE — TELEPHONE ENCOUNTER
Patient had stent placement in July 2021 -I spoke with Mayda Crespo we are going to put her in recall for June 2022 to come back to see Mayda Crespo,  I spoke with the patient about the plan, she voiced understanding,  If she were to have any further or worsening symptoms, before next year she will call our office and we will reasses.   She knows to expect a letter around June/July    Recall has been placed

## 2021-12-01 ENCOUNTER — HOSPITAL ENCOUNTER (OUTPATIENT)
Dept: MAMMOGRAPHY | Facility: HOSPITAL | Age: 81
Discharge: HOME | End: 2021-12-01
Admitting: FAMILY MEDICINE

## 2021-12-01 DIAGNOSIS — Z12.31 ENCOUNTER FOR SCREENING MAMMOGRAM FOR MALIGNANT NEOPLASM OF BREAST: ICD-10-CM

## 2021-12-01 PROCEDURE — 77067 SCR MAMMO BI INCL CAD: CPT

## 2021-12-01 PROCEDURE — 77063 BREAST TOMOSYNTHESIS BI: CPT

## 2021-12-10 ENCOUNTER — OFFICE VISIT (OUTPATIENT)
Dept: CARDIOLOGY CLINIC | Age: 81
End: 2021-12-10
Payer: MEDICARE

## 2021-12-10 VITALS
SYSTOLIC BLOOD PRESSURE: 108 MMHG | HEIGHT: 64 IN | WEIGHT: 164 LBS | OXYGEN SATURATION: 99 % | DIASTOLIC BLOOD PRESSURE: 64 MMHG | BODY MASS INDEX: 28 KG/M2

## 2021-12-10 DIAGNOSIS — I25.10 CORONARY ARTERY DISEASE INVOLVING NATIVE CORONARY ARTERY OF NATIVE HEART WITHOUT ANGINA PECTORIS: ICD-10-CM

## 2021-12-10 DIAGNOSIS — I10 ESSENTIAL HYPERTENSION: ICD-10-CM

## 2021-12-10 DIAGNOSIS — E78.5 HYPERLIPIDEMIA, UNSPECIFIED HYPERLIPIDEMIA TYPE: Primary | ICD-10-CM

## 2021-12-10 PROCEDURE — G8484 FLU IMMUNIZE NO ADMIN: HCPCS | Performed by: NURSE PRACTITIONER

## 2021-12-10 PROCEDURE — 1123F ACP DISCUSS/DSCN MKR DOCD: CPT | Performed by: NURSE PRACTITIONER

## 2021-12-10 PROCEDURE — G8427 DOCREV CUR MEDS BY ELIG CLIN: HCPCS | Performed by: NURSE PRACTITIONER

## 2021-12-10 PROCEDURE — G8417 CALC BMI ABV UP PARAM F/U: HCPCS | Performed by: NURSE PRACTITIONER

## 2021-12-10 PROCEDURE — 99214 OFFICE O/P EST MOD 30 MIN: CPT | Performed by: NURSE PRACTITIONER

## 2021-12-10 PROCEDURE — G8400 PT W/DXA NO RESULTS DOC: HCPCS | Performed by: NURSE PRACTITIONER

## 2021-12-10 PROCEDURE — 1090F PRES/ABSN URINE INCON ASSESS: CPT | Performed by: NURSE PRACTITIONER

## 2021-12-10 PROCEDURE — 4040F PNEUMOC VAC/ADMIN/RCVD: CPT | Performed by: NURSE PRACTITIONER

## 2021-12-10 PROCEDURE — 1036F TOBACCO NON-USER: CPT | Performed by: NURSE PRACTITIONER

## 2021-12-10 RX ORDER — ROSUVASTATIN CALCIUM 40 MG/1
40 TABLET, COATED ORAL NIGHTLY
Qty: 30 TABLET | Refills: 3 | Status: SHIPPED | OUTPATIENT
Start: 2021-12-10 | End: 2022-04-05 | Stop reason: SINTOL

## 2021-12-10 RX ORDER — DULOXETIN HYDROCHLORIDE 30 MG/1
30 CAPSULE, DELAYED RELEASE ORAL DAILY
COMMUNITY
End: 2022-04-05

## 2021-12-10 ASSESSMENT — ENCOUNTER SYMPTOMS
COUGH: 0
SHORTNESS OF BREATH: 0
CHEST TIGHTNESS: 0
SORE THROAT: 0
WHEEZING: 0

## 2021-12-10 NOTE — PROGRESS NOTES
tablet 3    DULoxetine (CYMBALTA) 30 MG extended release capsule Take 30 mg by mouth daily      pantoprazole (PROTONIX) 40 MG tablet Take 40 mg by mouth daily      aspirin EC 81 MG EC tablet Take 1 tablet by mouth daily 90 tablet 1    clopidogrel (PLAVIX) 75 MG tablet Take 1 tablet by mouth daily 90 tablet 1    carvedilol (COREG) 3.125 MG tablet Take 1 tablet by mouth 2 times daily (with meals) 60 tablet 3    insulin glargine (LANTUS) 100 UNIT/ML injection vial Inject 16 Units into the skin nightly      celecoxib (CELEBREX) 200 MG capsule Take 200 mg by mouth daily      levothyroxine (SYNTHROID) 75 MCG tablet Take 75 mcg by mouth Daily.  MetFORMIN HCl (GLUCOPHAGE PO) Take 1,000 mg by mouth daily       LISINOPRIL PO Take 10 mg by mouth daily. No current facility-administered medications for this visit. Allergies: Januvia [sitagliptin] and Vancomycin  Past Medical History:   Diagnosis Date    Arthritis     Fernandes's esophagus     DDD (degenerative disc disease), lumbar     DM (diabetes mellitus), [facility] 2 ([facility] [address]     Duodenal ulcer     E. coli sepsis ([facility] [address] 02/2014    Elevated lipids     Elevated pancreatic enzyme     Gastritis     GERD (gastroesophageal reflux disease)     Hiatal hernia     History of colon polyps     HTN (hypertension)     Hypercholesterolemia     Hypothyroidism     Nausea and vomiting     Osteoarthritis     other     Non Specified Ulcers    Pernicious anemia     Skin cancer     History OF.  Venous angioma     left-parietal     Past Surgical History:   Procedure Laterality Date    BREAST SURGERY      BG Biopsy    CHOLECYSTECTOMY      COLONOSCOPY  3/16/2005    DR. OTERO    COLONOSCOPY  5/21/2010    tubular adenoma    COLONOSCOPY  12/31/2015    Dr Soto Hence, normal, 5 yr recall    FRACTURE SURGERY      x 2 right ankle    HAND SURGERY Right 07/2012    Dr Doan Ee removed due to arthritis and tendon repair    HYSTERECTOMY, TOTAL ABDOMINAL  1969    KNEE SURGERY Right     LUMBAR [address] SURGERY  2008    L5-S1-Dr Jasmin Arellano LUMBAR SPINE SURGERY  2005    OTHER SURGICAL HISTORY Right     Basa Cell Lesion Removal-elbow    OTHER SURGICAL HISTORY Right 2002    ORIF ankle    [place] EGD INTRMURAL NEEDLE ASPIR/BIOP ALTERED ANATOMY N/A 8/15/2018    Dr Diomedes Liao EGD TRANSORAL BIOPSY SINGLE/MULTIPLE N/A 2017    EGD BIOPSY performed by Salty Gallardo DO at [address] Right     ROTATOR CUFF REPAIR Left 2013    Dr De La Rosa [place] ENDOSCOPY  2005    DR. KIM    UPPER GASTROINTESTINAL ENDOSCOPY  10/22/2009    peptic stricture, schatzki ring dilated 46 fr, sm hiatal hernia, gastritis    UPPER GASTROINTESTINAL ENDOSCOPY  10/01/2014    Dr Elliot Kim dilation, urease neg    UPPER GASTROINTESTINAL ENDOSCOPY N/A 2015    Dr Guerra Leaks, stricture/ulcers, repeat 8 wks    UPPER GASTROINTESTINAL ENDOSCOPY  2017    Dr Hauser-w/dilation over wire, 47 [place]-hiatal hernia, distal esophageal stricture    UPPER GASTROINTESTINAL ENDOSCOPY  8/15/2018    Dr SANYA Finley-joey/dilation over wire-[address] and EUS-Possible duodenitis, gastritis, duodenal stricture likely peptic stricture nature, hiatal hernia    UPPER GASTROINTESTINAL ENDOSCOPY  8/15/2018    Dr SANYA Finley-joey/dilation over wire-[address] and EUS-Possible duodenitis, gastritis, duodenal stricture likely peptic stricture nature, hiatal hernia     Family History   Problem Relation Age of Onset    Breast Cancer Mother     Colon Cancer Neg Hx     Colon Polyps Neg Hx     Esophageal Cancer Neg Hx     Liver Disease Neg Hx     Liver Cancer Neg Hx     Rectal Cancer Neg Hx     Stomach Cancer Neg Hx      Social History     Tobacco Use    Smoking status: Former Smoker     Packs/day: 0.50     Years: 10.00     Pack years: 5.00     Types: Cigarettes     Quit date: 1989     Years since quittin.2    Smokeless tobacco: Never Used Substance Use Topics    Alcohol use: Yes     Comment: glass of wine a couple times per month          Review of Systems:    Review of Systems   Constitutional: Negative for activity change, chills, diaphoresis, fatigue and fever. HENT: Negative for congestion and sore throat. Respiratory: Negative for cough, chest tightness, shortness of breath and wheezing. Cardiovascular: Negative for chest pain, palpitations and leg swelling. Neurological: Negative for dizziness, syncope, light-headedness and headaches. Psychiatric/Behavioral: Negative for confusion. The patient is not nervous/anxious. Objective:    /64   Ht 5' 4\" (1.626 m)   Wt 164 lb (74.4 kg)   SpO2 99%   BMI 28.15 kg/m²     GENERAL - well developed and well nourished, conversant  HEENT   PERRLA, Hearing appears normal, gentleman lids are normal.  External inspection of ears and nose appear normal.  NECK - no thyromegaly, no JVD, trachea is in the midline  CARDIOVASCULAR  PMI is in the mid line clavicular position, Normal S1 and S2 with no systolic murmur. No S3 or S4    PULMONARY  no respiratory distress. No wheezes or rales. Lungs are clear to ausculation, normal respiratory effort. ABDOMEN   soft, non tender, no rebound  MUSCULOSKELETAL   range of motion of the upper and lower extermites appears normal and equal and is without pain   EXTREMITIES - no significant edema   NEUROLOGIC  gait and station are normal  SKIN - turgor is normal, can warm and dry.   PSYCHIATRIC - normal mood and affect, alert and orientated x 3,      ASSESSMENT:    ALL THE CARDIOLOGY PROBLEMS ARE LISTED ABOVE; HOWEVER, THE FOLLOWING SPECIFIC CARDIAC PROBLEMS / CONDITIONS WERE ADDRESSED AND TREATED DURING THE OFFICE VISIT TODAY:                                                                                            MEDICAL DECISION MAKING             Cardiac Specific Problem / Diagnosis  Discussion and Data Reviewed Diagnostic Procedures Ordered Management Options Selected           1. CAD  Stable   Review and summation of old records:    No chest pain. Patient is on aspirin, Coreg, Plavix and Crestor. No Continue current medications:     Yes           2. Hypertension  Well Controlled   Pressure in the office today 108/64. O2 sat 99%. Patient is on Coreg 3.125 mg twice daily. No Continue current medications:    Yes           3. Hyperlipidemia Stable  patient is on Crestor 40 mg nightly. No Continue current medications: Yes                     No orders of the defined types were placed in this encounter. Orders Placed This Encounter   Medications    rosuvastatin (CRESTOR) 40 MG tablet     Sig: Take 1 tablet by mouth nightly     Dispense:  30 tablet     Refill:  3       Discussed with patient. Return in about 6 months (around 6/10/2022) for Routine with me. I greatly appreciate the opportunity to meet Lexie Naren and your confidence in allowing me to participate in her cardiovascular care. MARTY Abraham - NP  12/10/2021 9:59 AM CST                    This dictation was generated by voice recognition computer software. Although all attempts are made to edit dictation for accuracy, there may be errors in the transcription that are not intended.

## 2022-03-18 NOTE — PROGRESS NOTES
Percy Raddle Severns transferred to 709 from [facility] via wheelchair. Reason for transfer: lower level of care   Explained reason for transfer to Patient and Family. Belongings: phone and clothing with patient at bedside . Soft chart transferred with patient: Yes. Telemetry box number [id number] transferred with patient: yes. Report given to: Chuck Hammans RN, at bedside.       Electronically signed by Sowmya Youngblood RN on 7/26/2021 at 5:38 PM 36.2

## 2022-04-05 ENCOUNTER — OFFICE VISIT (OUTPATIENT)
Dept: CARDIOLOGY CLINIC | Age: 82
End: 2022-04-05
Payer: MEDICARE

## 2022-04-05 VITALS
HEART RATE: 71 BPM | OXYGEN SATURATION: 99 % | HEIGHT: 64 IN | SYSTOLIC BLOOD PRESSURE: 128 MMHG | WEIGHT: 169 LBS | DIASTOLIC BLOOD PRESSURE: 82 MMHG | BODY MASS INDEX: 28.85 KG/M2

## 2022-04-05 DIAGNOSIS — R07.9 CHEST PAIN IN ADULT: ICD-10-CM

## 2022-04-05 DIAGNOSIS — I25.10 CORONARY ARTERY DISEASE INVOLVING NATIVE CORONARY ARTERY OF NATIVE HEART WITHOUT ANGINA PECTORIS: Primary | ICD-10-CM

## 2022-04-05 DIAGNOSIS — R06.09 DYSPNEA ON EXERTION: ICD-10-CM

## 2022-04-05 DIAGNOSIS — I25.10 CORONARY ARTERY DISEASE INVOLVING NATIVE CORONARY ARTERY OF NATIVE HEART WITHOUT ANGINA PECTORIS: ICD-10-CM

## 2022-04-05 DIAGNOSIS — I10 ESSENTIAL HYPERTENSION: ICD-10-CM

## 2022-04-05 DIAGNOSIS — E78.5 DYSLIPIDEMIA: ICD-10-CM

## 2022-04-05 LAB
ANION GAP SERPL CALCULATED.3IONS-SCNC: 12 MMOL/L (ref 7–19)
BASOPHILS ABSOLUTE: 0.1 K/UL (ref 0–0.2)
BASOPHILS RELATIVE PERCENT: 0.8 % (ref 0–1)
BUN BLDV-MCNC: 24 MG/DL (ref 8–23)
CALCIUM SERPL-MCNC: 10.1 MG/DL (ref 8.8–10.2)
CHLORIDE BLD-SCNC: 101 MMOL/L (ref 98–111)
CO2: 26 MMOL/L (ref 22–29)
CREAT SERPL-MCNC: 1.3 MG/DL (ref 0.5–0.9)
D DIMER: 0.3 UG/ML FEU (ref 0–0.48)
EOSINOPHILS ABSOLUTE: 0.3 K/UL (ref 0–0.6)
EOSINOPHILS RELATIVE PERCENT: 3.4 % (ref 0–5)
GFR AFRICAN AMERICAN: 48
GFR NON-AFRICAN AMERICAN: 39
GLUCOSE BLD-MCNC: 115 MG/DL (ref 74–109)
HCT VFR BLD CALC: 38.2 % (ref 37–47)
HEMOGLOBIN: 12 G/DL (ref 12–16)
IMMATURE GRANULOCYTES #: 0 K/UL
LYMPHOCYTES ABSOLUTE: 2 K/UL (ref 1.1–4.5)
LYMPHOCYTES RELATIVE PERCENT: 25.4 % (ref 20–40)
MCH RBC QN AUTO: 28.2 PG (ref 27–31)
MCHC RBC AUTO-ENTMCNC: 31.4 G/DL (ref 33–37)
MCV RBC AUTO: 89.7 FL (ref 81–99)
MONOCYTES ABSOLUTE: 0.5 K/UL (ref 0–0.9)
MONOCYTES RELATIVE PERCENT: 7 % (ref 0–10)
NEUTROPHILS ABSOLUTE: 4.9 K/UL (ref 1.5–7.5)
NEUTROPHILS RELATIVE PERCENT: 63 % (ref 50–65)
PDW BLD-RTO: 12.1 % (ref 11.5–14.5)
PLATELET # BLD: 250 K/UL (ref 130–400)
PMV BLD AUTO: 10.2 FL (ref 9.4–12.3)
POTASSIUM SERPL-SCNC: 4.9 MMOL/L (ref 3.5–5)
RBC # BLD: 4.26 M/UL (ref 4.2–5.4)
SODIUM BLD-SCNC: 139 MMOL/L (ref 136–145)
WBC # BLD: 7.7 K/UL (ref 4.8–10.8)

## 2022-04-05 PROCEDURE — G8417 CALC BMI ABV UP PARAM F/U: HCPCS | Performed by: NURSE PRACTITIONER

## 2022-04-05 PROCEDURE — 1036F TOBACCO NON-USER: CPT | Performed by: NURSE PRACTITIONER

## 2022-04-05 PROCEDURE — 1123F ACP DISCUSS/DSCN MKR DOCD: CPT | Performed by: NURSE PRACTITIONER

## 2022-04-05 PROCEDURE — 4040F PNEUMOC VAC/ADMIN/RCVD: CPT | Performed by: NURSE PRACTITIONER

## 2022-04-05 PROCEDURE — 1090F PRES/ABSN URINE INCON ASSESS: CPT | Performed by: NURSE PRACTITIONER

## 2022-04-05 PROCEDURE — 99214 OFFICE O/P EST MOD 30 MIN: CPT | Performed by: NURSE PRACTITIONER

## 2022-04-05 PROCEDURE — G8400 PT W/DXA NO RESULTS DOC: HCPCS | Performed by: NURSE PRACTITIONER

## 2022-04-05 PROCEDURE — 93000 ELECTROCARDIOGRAM COMPLETE: CPT | Performed by: NURSE PRACTITIONER

## 2022-04-05 PROCEDURE — G8427 DOCREV CUR MEDS BY ELIG CLIN: HCPCS | Performed by: NURSE PRACTITIONER

## 2022-04-05 RX ORDER — ERGOCALCIFEROL 1.25 MG/1
50000 CAPSULE ORAL WEEKLY
COMMUNITY

## 2022-04-05 RX ORDER — EZETIMIBE 10 MG/1
10 TABLET ORAL DAILY
COMMUNITY

## 2022-04-05 ASSESSMENT — ENCOUNTER SYMPTOMS
WHEEZING: 0
CHEST TIGHTNESS: 0
COUGH: 0
SORE THROAT: 0
SHORTNESS OF BREATH: 1

## 2022-04-05 NOTE — PROGRESS NOTES
[facility] Cardiology   Established Patient Office Visit   Chris [facility]. [address] [facility]  [phone]        OFFICE VISIT:  2022    Denise Schafer - : 1940    Reason For Visit:  Tennille Cole is a [de-identified] y.o. female who is here for Follow-up    1. Coronary artery disease involving native coronary artery of native heart without angina pectoris    2. Essential hypertension    3. Dyslipidemia    4. Chest pain in adult    5. Dyspnea on exertion           Patient with a history of coronary artery disease, hypertension, hyperlipidemia.     Patient had cardiac catheterization 2021 after experiencing acute inferior wall MI and underwent drug-eluting stent to the mid RCA. She was placed on aspirin, Plavix, statin, beta-blocker and ACE inhibitor. Patient presents to clinic today with complaints of fatigue, chest pain and shortness of breath. Patient states this started about 2 months ago. She is experiencing shortness of breath with very short distances.   Relieved with rest.  She is also experiencing chest discomfort with exertion that is relieved with rest.     Subjective    Denise Schafer is a [de-identified] y.o. female with the following history as recorded in [facility]:    Patient Active Problem List    Diagnosis Date Noted    Acute inferior myocardial infarction [address] [address] 2021    DDD (degenerative disc disease), lumbar     Hypercholesterolemia     DM (diabetes mellitus), type 2 ([address]     Elevated lipase     Duodenal stricture     Duodenitis     Esophageal stricture     S/P dilatation of esophageal stricture 2017    Non-intractable vomiting with nausea 2017    Midepigastric pain 2017    Generalized weakness 2017    History of gastric ulcer 2017    History of adenomatous polyp of colon 2015    Esophageal dysphagia 2014    Chronic GERD 2014    History of esophageal stricture 2014     Current Outpatient Medications   Medication Sig Dispense Refill    ezetimibe (ZETIA) 10 MG tablet Take 10 mg by mouth daily      vitamin D (ERGOCALCIFEROL) 1.25 MG (97037 UT) CAPS capsule Take 50,000 Units by mouth once a week      pantoprazole (PROTONIX) 40 MG tablet Take 40 mg by mouth daily      aspirin EC 81 MG EC tablet Take 1 tablet by mouth daily 90 tablet 1    clopidogrel (PLAVIX) 75 MG tablet Take 1 tablet by mouth daily 90 tablet 1    insulin glargine (LANTUS) 100 UNIT/ML injection vial Inject 16 Units into the skin nightly      celecoxib (CELEBREX) 200 MG capsule Take 200 mg by mouth daily      levothyroxine (SYNTHROID) 75 MCG tablet Take 75 mcg by mouth Daily.  MetFORMIN HCl (GLUCOPHAGE PO) Take 1,000 mg by mouth daily       LISINOPRIL PO Take 10 mg by mouth daily.  carvedilol (COREG) 3.125 MG tablet Take 1 tablet by mouth 2 times daily (with meals) (Patient not taking: Reported on 4/5/2022) 60 tablet 3     No current facility-administered medications for this visit. Allergies: Januvia [sitagliptin] and Vancomycin  Past Medical History:   Diagnosis Date    Arthritis     Fernandes's esophagus     DDD (degenerative disc disease), lumbar     DM (diabetes mellitus), type 2 [facility] [address]     Duodenal ulcer     E. coli sepsis ([facility] [address] 02/2014    Elevated lipids     Elevated pancreatic enzyme     Gastritis     GERD (gastroesophageal reflux disease)     Hiatal hernia     History of colon polyps     HTN (hypertension)     Hypercholesterolemia     Hypothyroidism     Nausea and vomiting     Osteoarthritis     other     Non Specified Ulcers    Pernicious anemia     Skin cancer     History OF.  Venous angioma     left-parietal     Past Surgical History:   Procedure Laterality Date    BREAST SURGERY      BG Biopsy    CHOLECYSTECTOMY      COLONOSCOPY  3/16/2005    DR. OTERO    COLONOSCOPY  5/21/2010    tubular adenoma    COLONOSCOPY  12/31/2015    Dr Sriram Langford, normal, 5 yr recall    FRACTURE SURGERY      x 2 right ankle    years: 5.00     Types: Cigarettes     Quit date: 1989     Years since quittin.5    Smokeless tobacco: Never Used   Substance Use Topics    Alcohol use: Yes     Comment: glass of wine a couple times per month          Review of Systems:    Review of Systems   Constitutional: Positive for fatigue. Negative for activity change, chills, diaphoresis and fever. HENT: Negative for congestion and sore throat. Respiratory: Positive for shortness of breath. Negative for cough, chest tightness and wheezing. Cardiovascular: Positive for chest pain. Negative for palpitations and leg swelling. Neurological: Positive for dizziness and light-headedness. Negative for syncope and headaches. Psychiatric/Behavioral: Negative for confusion. The patient is not nervous/anxious. Objective:    /82   Pulse 71   Ht 5' 4\" (1.626 m)   Wt 169 lb (76.7 kg)   SpO2 99%   BMI 29.01 kg/m²     GENERAL - well developed and well nourished, conversant  HEENT   PERRLA, Hearing appears normal, gentleman lids are normal.  External inspection of ears and nose appear normal.  NECK - no thyromegaly, no JVD, trachea is in the midline  CARDIOVASCULAR  PMI is in the mid line clavicular position, Normal S1 and S2 with no systolic murmur. No S3 or S4    PULMONARY  no respiratory distress. No wheezes or rales. Lungs are clear to ausculation, normal respiratory effort. ABDOMEN   soft, non tender, no rebound  MUSCULOSKELETAL   range of motion of the upper and lower extermites appears normal and equal and is without pain   EXTREMITIES - no significant edema   NEUROLOGIC  gait and station are normal  SKIN - turgor is normal, can warm and dry.   PSYCHIATRIC - normal mood and affect, alert and orientated x 3,      ASSESSMENT:    ALL THE CARDIOLOGY PROBLEMS ARE LISTED ABOVE; HOWEVER, THE FOLLOWING SPECIFIC CARDIAC PROBLEMS / CONDITIONS WERE ADDRESSED AND TREATED DURING THE OFFICE VISIT TODAY: MEDICAL DECISION MAKING             Cardiac Specific Problem / Diagnosis  Discussion and Data Reviewed Diagnostic Procedures Ordered Management Options Selected           1. Dyspnea on exertion  Chief complaint   Review and summation of old records:    O2 sat in the office 99%. Will order 2D echo to evaluate current EF and for any valvular heart disease. No Continue current medications:     Yes           2. Chest pain  Chief complaint   EKG in the office today showing sinus rhythm with a heart rate of 71 bpm.  Nonspecific T wave abnormalities. Given patient's symptoms and history of coronary artery disease requiring stent placement. Will order dobutamine stress echocardiogram.  Patient is on aspirin, Coreg, Plavix. Yes Continue current medications:    Yes           3. Hypertension Well Controlled  blood pressure in the office today 128/82. O2 sat 99%. Patient is on lisinopril 10 mg daily. No Continue current medications:        Yes                     Orders Placed This Encounter   Procedures    D-Dimer, Quantitative     Standing Status:   Future     Number of Occurrences:   1     Standing Expiration Date:   4/5/2023    CBC with Auto Differential     Standing Status:   Future     Number of Occurrences:   1     Standing Expiration Date:   4/5/2023    Basic Metabolic Panel     Standing Status:   Future     Number of Occurrences:   1     Standing Expiration Date:   4/5/2023    EKG 12 lead     Order Specific Question:   Reason for Exam?     Answer:   Chest pain    Echo 2D w doppler w color complete     Standing Status:   Future     Standing Expiration Date:   4/5/2023     Order Specific Question:   Reason for exam:     Answer:   dyspnea    Echo pharmacological stress test     Standing Status:   Future     Standing Expiration Date:   4/5/2023     Order Specific Question:   Reason for exam:     Answer:   chest pain, dyspnea, CAD     No orders of the defined types were placed in this encounter. Discussed with patient. Return in about 2 weeks (around 4/19/2022) for results with me . I greatly appreciate the opportunity to meet Chrissy Jones and your confidence in allowing me to participate in her cardiovascular care. MARTY Garcia NP  4/5/2022 9:13 AM CDT                    This dictation was generated by voice recognition computer software. Although all attempts are made to edit dictation for accuracy, there may be errors in the transcription that are not intended.

## 2022-04-05 NOTE — PATIENT INSTRUCTIONS
[place] at the [facility] and [address] [facility] located on the first floor of [place] [address] through [facility] main entrance and turn immediately to your left. Date/Time:     Patient's contact number:  [phone] (home)     Echocardiogram -  No prep. Takes approximately 30 min. An echocardiogram uses sound waves to produce images of your heart. This commonly used test allows your doctor to see how your heart is beating and pumping blood. Your doctor can use the images from an echocardiogram to identify various abnormalities in the heart muscle and valves. This test has 2 parts:   Ø You will be asked to disrobe from the waist up and given a gown to wear. The technologist will then hook up an EKG monitor to you for the entire exam.   Ø You will then have an ultrasound of your heart (echocardiogram) to assess the heart muscle, heart valves and heart function. You may eat and take any medicines before the exam.     If you need to change your appointment, please call outpatient scheduling at [phone]. [place] at the [facility] and [address] [facility] located on the first floor of [place] [address] through [facility] main entrance and turn immediately to your left. Patient's contact number:  [phone] (home)     Date/Time:     Dobutamine Stress Test    A chemical stress test uses chemical agents injected into the body through the vein. These chemicals make the heart function as if it were under stress. A chemical stress test is used when a traditional stress test (called a cardiac stress test) cannot be done. Testing will take approximately one hour. Dobutamine Stress Echocardiogram Instructions:   No caffeine 24 hours prior to the testing. This includes: coffee, pop/soda, chocolate, cold medications, etc.  Any product that might contain caffeine.     Do not eat or drink anything, except water, eight (8) hours before the test.   No alcohol or nicotine twelve (12) hours prior to your test.   Wear comfortable clothing. If you are taking metoprolol, stop morning of this procedure. If you need to change this appointment, please call outpatient scheduling at [phone].

## 2022-04-06 DIAGNOSIS — R79.89 CREATININE ELEVATION: ICD-10-CM

## 2022-04-06 DIAGNOSIS — R79.9 ELEVATED BUN: Primary | ICD-10-CM

## 2022-04-14 ENCOUNTER — HOSPITAL ENCOUNTER (OUTPATIENT)
Dept: NON INVASIVE DIAGNOSTICS | Age: 82
Discharge: HOME | End: 2022-04-14
Payer: MEDICARE

## 2022-04-14 VITALS — BODY MASS INDEX: 29.01 KG/M2 | WEIGHT: 169 LBS

## 2022-04-14 DIAGNOSIS — R06.09 DYSPNEA ON EXERTION: ICD-10-CM

## 2022-04-14 DIAGNOSIS — R07.9 CHEST PAIN IN ADULT: ICD-10-CM

## 2022-04-14 LAB
LV EF: 53 %
LVEF MODALITY: NORMAL

## 2022-04-14 PROCEDURE — 2580000003 HC RX 258: Performed by: NURSE PRACTITIONER

## 2022-04-14 PROCEDURE — C8929 TTE W OR WO FOL WCON,DOPPLER: HCPCS

## 2022-04-14 PROCEDURE — 93350 STRESS TTE ONLY: CPT

## 2022-04-14 PROCEDURE — 6360000002 HC RX W HCPCS: Performed by: NURSE PRACTITIONER

## 2022-04-14 PROCEDURE — 6360000004 HC RX CONTRAST MEDICATION: Performed by: NURSE PRACTITIONER

## 2022-04-14 RX ORDER — DOBUTAMINE HYDROCHLORIDE 100 MG/100ML
10 INJECTION INTRAVENOUS CONTINUOUS PRN
Status: DISCONTINUED | OUTPATIENT
Start: 2022-04-14 | End: 2022-04-15 | Stop reason: HOSPADM

## 2022-04-14 RX ORDER — ATROPINE SULFATE 0.1 MG/ML
1 INJECTION INTRAVENOUS PRN
Status: DISCONTINUED | OUTPATIENT
Start: 2022-04-14 | End: 2022-04-15 | Stop reason: HOSPADM

## 2022-04-14 RX ORDER — SODIUM CHLORIDE 9 MG/ML
INJECTION, SOLUTION INTRAVENOUS
Status: COMPLETED | OUTPATIENT
Start: 2022-04-14 | End: 2022-04-14

## 2022-04-14 RX ORDER — METOPROLOL TARTRATE 5 MG/5ML
5 INJECTION INTRAVENOUS PRN
Status: DISCONTINUED | OUTPATIENT
Start: 2022-04-14 | End: 2022-04-15 | Stop reason: HOSPADM

## 2022-04-14 RX ADMIN — DOBUTAMINE HYDROCHLORIDE 10 MCG/KG/MIN: 100 INJECTION INTRAVENOUS at 10:36

## 2022-04-14 RX ADMIN — PERFLUTREN 1.65 MG: 6.52 INJECTION, SUSPENSION INTRAVENOUS at 10:33

## 2022-04-14 RX ADMIN — SODIUM CHLORIDE: 9 INJECTION, SOLUTION INTRAVENOUS at 10:36

## 2022-04-15 ENCOUNTER — TELEPHONE (OUTPATIENT)
Dept: CARDIOLOGY CLINIC | Age: 82
End: 2022-04-15

## 2022-04-15 NOTE — TELEPHONE ENCOUNTER
Called patient to try and go over results no vm and no answer. If patient calls back,   Let patient know of 2D echo results with an EF of 50 to 55%.  Mild grade 1 diastolic dysfunction.  Mild mitral regurg. Please let patient know that her dobutamine stress echocardiogram showed no evidence of clinical, EKG or echo evidence of myocardial ischemia.

## 2022-04-19 ENCOUNTER — OFFICE VISIT (OUTPATIENT)
Dept: CARDIOLOGY CLINIC | Age: 82
End: 2022-04-19
Payer: MEDICARE

## 2022-04-19 VITALS
BODY MASS INDEX: 28.85 KG/M2 | HEART RATE: 74 BPM | DIASTOLIC BLOOD PRESSURE: 72 MMHG | WEIGHT: 169 LBS | SYSTOLIC BLOOD PRESSURE: 132 MMHG | OXYGEN SATURATION: 99 % | HEIGHT: 64 IN

## 2022-04-19 DIAGNOSIS — E78.5 DYSLIPIDEMIA: Primary | ICD-10-CM

## 2022-04-19 DIAGNOSIS — I25.10 CORONARY ARTERY DISEASE INVOLVING NATIVE CORONARY ARTERY OF NATIVE HEART WITHOUT ANGINA PECTORIS: ICD-10-CM

## 2022-04-19 DIAGNOSIS — R55 SYNCOPE AND COLLAPSE: ICD-10-CM

## 2022-04-19 DIAGNOSIS — I10 ESSENTIAL HYPERTENSION: ICD-10-CM

## 2022-04-19 PROCEDURE — G8400 PT W/DXA NO RESULTS DOC: HCPCS | Performed by: NURSE PRACTITIONER

## 2022-04-19 PROCEDURE — 1090F PRES/ABSN URINE INCON ASSESS: CPT | Performed by: NURSE PRACTITIONER

## 2022-04-19 PROCEDURE — G8427 DOCREV CUR MEDS BY ELIG CLIN: HCPCS | Performed by: NURSE PRACTITIONER

## 2022-04-19 PROCEDURE — 1036F TOBACCO NON-USER: CPT | Performed by: NURSE PRACTITIONER

## 2022-04-19 PROCEDURE — 1123F ACP DISCUSS/DSCN MKR DOCD: CPT | Performed by: NURSE PRACTITIONER

## 2022-04-19 PROCEDURE — 99214 OFFICE O/P EST MOD 30 MIN: CPT | Performed by: NURSE PRACTITIONER

## 2022-04-19 PROCEDURE — G8417 CALC BMI ABV UP PARAM F/U: HCPCS | Performed by: NURSE PRACTITIONER

## 2022-04-19 PROCEDURE — 4040F PNEUMOC VAC/ADMIN/RCVD: CPT | Performed by: NURSE PRACTITIONER

## 2022-04-19 ASSESSMENT — ENCOUNTER SYMPTOMS
SHORTNESS OF BREATH: 1
COUGH: 0
SORE THROAT: 0
CHEST TIGHTNESS: 0
WHEEZING: 0

## 2022-04-19 NOTE — PROGRESS NOTES
[facility] Cardiology   Established Patient Office Visit   Chris [facility]. [address] [facility]  [phone]        OFFICE VISIT:  2022    Esther Morrissey - : 1940    Reason For Visit:  Nereida Primrose is a [de-identified] y.o. female who is here for Follow-up    1. Dyslipidemia    2. Coronary artery disease involving native coronary artery of native heart without angina pectoris    3. Essential hypertension    4. Syncope and collapse         Patient with a history of coronary artery disease, hypertension, hyperlipidemia.     Patient had cardiac catheterization 2021 after experiencing acute inferior wall MI and underwent drug-eluting stent to the mid RCA.  She was placed on aspirin, Plavix, statin, beta-blocker and ACE inhibitor.     Patient presented to clinic 2022 with complaints of fatigue, chest pain and shortness of breath. Patient stated this started about 2 months ago. She was experiencing shortness of breath with very short distances. Relieved with rest.  She also was experiencing chest discomfort with exertion that is relieved with rest.      During stress echocardiogram was ordered as well as 2D echo. 2D echo showed:  Conclusions      Summary   Normal left ventricular size with low normal systolic function EF 50 to 55%. Mild concentric left trickle hypertrophy with mild [grade 1] diastolic dysfunction. Normal left atrial size. Very mild thickening/questionable early calcification of an apparent   tricuspid aortic valve which is poorly seen in the short axis views   Mild thickening of a normally mobile mitral valve with mild regurgitation. No evidence of pulmonic stenosis or insufficiency   Normal right-sided chambers with preserved RV systolic function   Trace tricuspid regurgitation with RVSP estimate of 60 mmHg   IVC dimension and inspiratory motion are normal consistent with normal right atrial filling pressures. Aortic root and ascending segment measure within normal limits.  No significant pericardial effusion. Definity contrast utilized to better define endocardial borders      Signature      ----------------------------------------------------------------   Electronically signed by Melina Vasquez MD(Interpreting physician)   on 04/14/2022 12:13 PM    Dobutamine stress echocardiogram showed:    Summary   Dobutamine stress echocardiogram without clinical, electrocardiographic,   or echocardiographic evidence of myocardial ischemia. Signature      ----------------------------------------------------------------   Electronically signed by Melina Vasquez MD(Interpreting physician)   on 04/14/2022 01:53 PM    Patient presents to clinic today for results of the aforementioned test.  Results given to patient. Questions answered. Patient stated on Friday she had a syncopal episode getting out of the shower. Her  caught her in time. Got her back to bed. She states she has been feeling lightheaded and dizzy lately. There has been no other syncopal episodes. She feels fatigued all the time. She denies any chest pain.       Karley Priest is a [de-identified] y.o. female with the following history as recorded in [facility]:    Patient Active Problem List    Diagnosis Date Noted    Acute inferior myocardial infarction ([facility] [address] 07/25/2021    DDD (degenerative disc disease), lumbar     Hypercholesterolemia     DM (diabetes mellitus), type 2 ([facility] [address]     Elevated lipase     Duodenal stricture     Duodenitis     Esophageal stricture     S/P dilatation of esophageal stricture 12/26/2017    Non-intractable vomiting with nausea 11/20/2017    Midepigastric pain 11/20/2017    Generalized weakness 11/20/2017    History of gastric ulcer 11/20/2017    History of adenomatous polyp of colon 11/12/2015    Esophageal dysphagia 09/16/2014    Chronic GERD 09/16/2014    History of esophageal stricture 09/16/2014     Current Outpatient Medications   Medication Sig Dispense Refill    ezetimibe (ZETIA) 10 MG tablet Take 10 mg by mouth daily      vitamin D (ERGOCALCIFEROL) 1.25 MG (21948 UT) CAPS capsule Take 50,000 Units by mouth once a week      pantoprazole (PROTONIX) 40 MG tablet Take 40 mg by mouth daily      aspirin EC 81 MG EC tablet Take 1 tablet by mouth daily 90 tablet 1    clopidogrel (PLAVIX) 75 MG tablet Take 1 tablet by mouth daily 90 tablet 1    carvedilol (COREG) 3.125 MG tablet Take 1 tablet by mouth 2 times daily (with meals) 60 tablet 3    insulin glargine (LANTUS) 100 UNIT/ML injection vial Inject 16 Units into the skin nightly      celecoxib (CELEBREX) 200 MG capsule Take 200 mg by mouth daily      levothyroxine (SYNTHROID) 75 MCG tablet Take 75 mcg by mouth Daily.  MetFORMIN HCl (GLUCOPHAGE PO) Take 1,000 mg by mouth daily       LISINOPRIL PO Take 10 mg by mouth daily. No current facility-administered medications for this visit. Allergies: Perflutren lipid microsphere, Januvia [sitagliptin], and Vancomycin  Past Medical History:   Diagnosis Date    Arthritis     Fernandes's esophagus     DDD (degenerative disc disease), lumbar     DM (diabetes mellitus), type 2 ([facility] [address]     Duodenal ulcer     E. coli sepsis ([facility] [address] 02/2014    Elevated lipids     Elevated pancreatic enzyme     Gastritis     GERD (gastroesophageal reflux disease)     Hiatal hernia     History of colon polyps     HTN (hypertension)     Hypercholesterolemia     Hypothyroidism     Nausea and vomiting     Osteoarthritis     other     Non Specified Ulcers    Pernicious anemia     Skin cancer     History OF.  Venous angioma     left-parietal     Past Surgical History:   Procedure Laterality Date    BREAST SURGERY      BG Biopsy    CHOLECYSTECTOMY      COLONOSCOPY  3/16/2005    DR. OTERO    COLONOSCOPY  5/21/2010    tubular adenoma    COLONOSCOPY  12/31/2015    Dr Karolina Caruso, normal, 5 yr recall    FRACTURE SURGERY      x 2 right ankle    HAND SURGERY Right 07/2012    Dr Nunu Gallo [facility]-bone removed due to arthritis and tendon repair    HYSTERECTOMY, TOTAL ABDOMINAL  1969    KNEE SURGERY Right     LUMBAR [address] SURGERY  11/2008    L5-S1-Dr Hesham Scott LUMBAR SPINE SURGERY  2005    OTHER SURGICAL HISTORY Right     Basa Cell Lesion Removal-elbow    OTHER SURGICAL HISTORY Right 2002    ORIF ankle    [place] EGD INTRMURAL NEEDLE ASPIR/BIOP ALTERED ANATOMY N/A 8/15/2018    Dr Angel Bernard EGD TRANSORAL BIOPSY SINGLE/MULTIPLE N/A 12/6/2017    EGD BIOPSY performed by Leobardo Nieto DO at [address] [facility] Right     ROTATOR CUFF REPAIR Left 12/2013    Dr Vanda Chow  5/19/2005    DR. KIM    UPPER GASTROINTESTINAL ENDOSCOPY  10/22/2009    peptic stricture, schatzki ring dilated 46 fr, sm hiatal hernia, gastritis    UPPER GASTROINTESTINAL ENDOSCOPY  10/01/2014    Dr King Virgen dilation, urease neg    UPPER GASTROINTESTINAL ENDOSCOPY N/A 12/18/2015    Dr Karolina Caruso, stricture/ulcers, repeat 8 wks    UPPER GASTROINTESTINAL ENDOSCOPY  12/6/2017    Dr Hauser-w/dilation over wire, 46 [place]-hiatal hernia, distal esophageal stricture    UPPER GASTROINTESTINAL ENDOSCOPY  8/15/2018    Dr SANYA Finley-w/dilation over wire-[address] and EUS-Possible duodenitis, gastritis, duodenal stricture likely peptic stricture nature, hiatal hernia    UPPER GASTROINTESTINAL ENDOSCOPY  8/15/2018    Dr SANYA Finley-w/dilation over wire-[address] and EUS-Possible duodenitis, gastritis, duodenal stricture likely peptic stricture nature, hiatal hernia     Family History   Problem Relation Age of Onset    Breast Cancer Mother     Colon Cancer Neg Hx     Colon Polyps Neg Hx     Esophageal Cancer Neg Hx     Liver Disease Neg Hx     Liver Cancer Neg Hx     Rectal Cancer Neg Hx     Stomach Cancer Neg Hx      Social History     Tobacco Use    Smoking status: Former Smoker     Packs/day: 0.50     Years: 10.00     Pack years: 5.00     Types: Cigarettes     Quit date: 1989     Years since quittin.6    Smokeless tobacco: Never Used   Substance Use Topics    Alcohol use: Yes     Comment: glass of wine a couple times per month          Review of Systems:    Review of Systems   Constitutional: Positive for activity change and fatigue. Negative for chills, diaphoresis and fever. HENT: Negative for congestion and sore throat. Respiratory: Positive for shortness of breath. Negative for cough, chest tightness and wheezing. Cardiovascular: Negative for chest pain, palpitations and leg swelling. Neurological: Positive for dizziness, syncope and light-headedness. Negative for headaches. Psychiatric/Behavioral: Negative for confusion. The patient is not nervous/anxious. Objective:    /72   Pulse 74   Ht 5' 4\" (1.626 m)   Wt 169 lb (76.7 kg)   SpO2 99%   BMI 29.01 kg/m²     GENERAL - well developed and well nourished, conversant  HEENT   PERRLA, Hearing appears normal, gentleman lids are normal.  External inspection of ears and nose appear normal.  NECK - no thyromegaly, no JVD, trachea is in the midline  CARDIOVASCULAR  PMI is in the mid line clavicular position, Normal S1 and S2 with no systolic murmur. No S3 or S4    PULMONARY  no respiratory distress. No wheezes or rales. Lungs are clear to ausculation, normal respiratory effort. ABDOMEN   soft, non tender, no rebound  MUSCULOSKELETAL   range of motion of the upper and lower extermites appears normal and equal and is without pain   EXTREMITIES - no significant edema   NEUROLOGIC  gait and station are normal  SKIN - turgor is normal, can warm and dry.   PSYCHIATRIC - normal mood and affect, alert and orientated x 3,      ASSESSMENT:    ALL THE CARDIOLOGY PROBLEMS ARE LISTED ABOVE; HOWEVER, THE FOLLOWING SPECIFIC CARDIAC PROBLEMS / CONDITIONS WERE ADDRESSED AND TREATED DURING THE OFFICE VISIT TODAY: MEDICAL DECISION MAKING             Cardiac Specific Problem / Diagnosis  Discussion and Data Reviewed Diagnostic Procedures Ordered Management Options Selected           1. Syncope  Chief complaint   Review and summation of old records: Will place 14-day ZIO patch to address any bradycardia, pauses or dysrhythmias. Vital signs stable today. Yes Continue current medications:     Yes           2. CAD  Stable   No chest pain. Dobutamine stress echocardiogram normal.  Patient is on aspirin, Plavix, Coreg. No Continue current medications:    Yes           3. Hypertension Well Controlled  blood pressure 132/72. O2 sat 99%. Patient is on lisinopril 10 mg daily. Coreg 3.125 mg twice daily. No Continue current medications:       Yes           4. Dyslipidemia Stable  patient is on Zetia 10 mg daily. No Continue current medications:       Yes     Orders Placed This Encounter   Procedures    AR EXTERNAL ECG REC>7D<15D RECORDING     No orders of the defined types were placed in this encounter. Discussed with patient. Return in about 4 weeks (around 5/17/2022) for results with me . I greatly appreciate the opportunity to meet Mceknzie Piedra and your confidence in allowing me to participate in her cardiovascular care. MARTY Mcbride NP  4/19/2022 9:22 AM CDT                    This dictation was generated by voice recognition computer software. Although all attempts are made to edit dictation for accuracy, there may be errors in the transcription that are not intended.

## 2022-04-19 NOTE — PATIENT INSTRUCTIONS
14-day monitor (ZIO Patch)      ZIO Patch  The ZIO® Patch is a new form of ambulatory cardiac monitoring described as a wearable patch. The Dilshad Habermann is unique compared to traditional Holter monitors as the monitoring device has no leads, no wires and no batteries. The Dilshad Habermann weighs just a few ounces that is a peel and stick device that is worn for an extended monitoring period of up to 14 days. Even though the device is worn for a longer duration than a Holter monitor, the ZIO Patch is said to be preferred by nearly 80% of patients as compared to a traditional 24 Holter monitor. Please allow 8 to 10 days for results, once you have mailed off your device.     Features of the ZIO Patch:  Arguably the smallest ambulatory cardiac monitor   Light weight   No lead wires   Single channel ECG recording   No batteries   Superior patient compliance with a water resistant   Up to 14 days of continuous ECG recording

## 2022-05-03 ENCOUNTER — APPOINTMENT (OUTPATIENT)
Dept: CT IMAGING | Age: 82
End: 2022-05-03
Payer: MEDICARE

## 2022-05-03 ENCOUNTER — HOSPITAL ENCOUNTER (EMERGENCY)
Age: 82
Discharge: HOME | End: 2022-05-03
Attending: EMERGENCY MEDICINE
Payer: MEDICARE

## 2022-05-03 VITALS
BODY MASS INDEX: 28.99 KG/M2 | HEART RATE: 85 BPM | RESPIRATION RATE: 18 BRPM | DIASTOLIC BLOOD PRESSURE: 72 MMHG | WEIGHT: 174 LBS | OXYGEN SATURATION: 94 % | SYSTOLIC BLOOD PRESSURE: 149 MMHG | TEMPERATURE: 98.1 F | HEIGHT: 65 IN

## 2022-05-03 DIAGNOSIS — N30.00 ACUTE CYSTITIS WITHOUT HEMATURIA: Primary | ICD-10-CM

## 2022-05-03 LAB
ALBUMIN SERPL-MCNC: 4.5 G/DL (ref 3.5–5.2)
ALP BLD-CCNC: 80 U/L (ref 35–104)
ALT SERPL-CCNC: 13 U/L (ref 5–33)
ANION GAP SERPL CALCULATED.3IONS-SCNC: 14 MMOL/L (ref 7–19)
AST SERPL-CCNC: 12 U/L (ref 5–32)
BACTERIA: ABNORMAL /HPF
BASOPHILS ABSOLUTE: 0.1 K/UL (ref 0–0.2)
BASOPHILS RELATIVE PERCENT: 0.5 % (ref 0–1)
BILIRUB SERPL-MCNC: 1.1 MG/DL (ref 0.2–1.2)
BILIRUBIN URINE: NEGATIVE
BLOOD, URINE: ABNORMAL
BUN BLDV-MCNC: 22 MG/DL (ref 8–23)
CALCIUM SERPL-MCNC: 9.5 MG/DL (ref 8.8–10.2)
CHLORIDE BLD-SCNC: 100 MMOL/L (ref 98–111)
CLARITY: ABNORMAL
CO2: 24 MMOL/L (ref 22–29)
COLOR: YELLOW
CREAT SERPL-MCNC: 1.4 MG/DL (ref 0.5–0.9)
CRYSTALS, UA: ABNORMAL /HPF
EOSINOPHILS ABSOLUTE: 0.2 K/UL (ref 0–0.6)
EOSINOPHILS RELATIVE PERCENT: 2.2 % (ref 0–5)
EPITHELIAL CELLS, UA: 1 /HPF (ref 0–5)
GFR AFRICAN AMERICAN: 44
GFR NON-AFRICAN AMERICAN: 36
GLUCOSE BLD-MCNC: 157 MG/DL (ref 74–109)
GLUCOSE URINE: NEGATIVE MG/DL
HCT VFR BLD CALC: 37.8 % (ref 37–47)
HEMOGLOBIN: 11.9 G/DL (ref 12–16)
HYALINE CASTS: 4 /HPF (ref 0–8)
IMMATURE GRANULOCYTES #: 0 K/UL
KETONES, URINE: NEGATIVE MG/DL
LEUKOCYTE ESTERASE, URINE: ABNORMAL
LIPASE: 57 U/L (ref 13–60)
LYMPHOCYTES ABSOLUTE: 2.2 K/UL (ref 1.1–4.5)
LYMPHOCYTES RELATIVE PERCENT: 21.6 % (ref 20–40)
MCH RBC QN AUTO: 27.7 PG (ref 27–31)
MCHC RBC AUTO-ENTMCNC: 31.5 G/DL (ref 33–37)
MCV RBC AUTO: 88.1 FL (ref 81–99)
MONOCYTES ABSOLUTE: 0.7 K/UL (ref 0–0.9)
MONOCYTES RELATIVE PERCENT: 7.1 % (ref 0–10)
NEUTROPHILS ABSOLUTE: 7 K/UL (ref 1.5–7.5)
NEUTROPHILS RELATIVE PERCENT: 68.4 % (ref 50–65)
NITRITE, URINE: POSITIVE
PDW BLD-RTO: 11.5 % (ref 11.5–14.5)
PH UA: 5 (ref 5–8)
PLATELET # BLD: 273 K/UL (ref 130–400)
PMV BLD AUTO: 10.3 FL (ref 9.4–12.3)
POTASSIUM SERPL-SCNC: 4.1 MMOL/L (ref 3.5–5)
PROTEIN UA: 30 MG/DL
RBC # BLD: 4.29 M/UL (ref 4.2–5.4)
RBC UA: 6 /HPF (ref 0–4)
SODIUM BLD-SCNC: 138 MMOL/L (ref 136–145)
SPECIFIC GRAVITY UA: 1.01 (ref 1–1.03)
TOTAL PROTEIN: 6.7 G/DL (ref 6.6–8.7)
UROBILINOGEN, URINE: 1 E.U./DL
WBC # BLD: 10.2 K/UL (ref 4.8–10.8)
WBC UA: 65 /HPF (ref 0–5)

## 2022-05-03 PROCEDURE — 36415 COLL VENOUS BLD VENIPUNCTURE: CPT

## 2022-05-03 PROCEDURE — 87186 SC STD MICRODIL/AGAR DIL: CPT

## 2022-05-03 PROCEDURE — 81001 URINALYSIS AUTO W/SCOPE: CPT

## 2022-05-03 PROCEDURE — 83690 ASSAY OF LIPASE: CPT

## 2022-05-03 PROCEDURE — 85025 COMPLETE CBC W/AUTO DIFF WBC: CPT

## 2022-05-03 PROCEDURE — 74177 CT ABD & PELVIS W/CONTRAST: CPT

## 2022-05-03 PROCEDURE — 99285 EMERGENCY DEPT VISIT HI MDM: CPT

## 2022-05-03 PROCEDURE — 87086 URINE CULTURE/COLONY COUNT: CPT

## 2022-05-03 PROCEDURE — 6360000004 HC RX CONTRAST MEDICATION: Performed by: EMERGENCY MEDICINE

## 2022-05-03 PROCEDURE — 80053 COMPREHEN METABOLIC PANEL: CPT

## 2022-05-03 RX ORDER — CEFDINIR 300 MG/1
300 CAPSULE ORAL 2 TIMES DAILY
Qty: 14 CAPSULE | Refills: 0 | Status: SHIPPED | OUTPATIENT
Start: 2022-05-03 | End: 2022-05-10

## 2022-05-03 RX ADMIN — IOPAMIDOL 90 ML: 755 INJECTION, SOLUTION INTRAVENOUS at 05:55

## 2022-05-03 NOTE — ED PROVIDER NOTES
[facility] EMERGENCY DEPT  eMERGENCY dEPARTMENT eNCOUnter      Pt Name: Gallo Sanchez  MRN: [id number]  [place] 1940  Date of evaluation: 5/3/2022  Provider: Elpidio Alcazar MD    CHIEF COMPLAINT       Chief Complaint   Patient presents with    Abdominal Pain     LLQ for past few months, worse today         HISTORY OF PRESENT ILLNESS   (Location/Symptom, Timing/Onset,Context/Setting, Quality, Duration, Modifying Factors, Severity)  Note limiting factors. Gallo Sanchez is a [de-identified] y.o. female who presents to the emergency department for evaluation regarding lower quadrant abdominal pain. Patient states she has had some intermittent pain over the last couple months but over about the last 1 days she has noticed increased pain that is sharp in nature. States it seems to be localized over the left side of her abdomen. She has not really had any back or flank pain. No prior history of kidney stones. Denies fevers or chills. She has not had any episodes of vomiting or diarrhea. Denies any black or tarry stools. There have really been no relieving factors. HPI    NursingNotes were reviewed. REVIEW OF SYSTEMS    (2-9 systems for level 4, 10 or more for level 5)     Review of Systems   Constitutional: Negative for chills and fever. Respiratory: Negative for shortness of breath. Cardiovascular: Negative for chest pain. Gastrointestinal: Positive for abdominal pain. Negative for abdominal distention, diarrhea, nausea and vomiting. Genitourinary: Negative for hematuria. All other systems reviewed and are negative.            PAST MEDICALHISTORY     Past Medical History:   Diagnosis Date    Arthritis     Fernandes's esophagus     DDD (degenerative disc disease), lumbar     DM [facility]diabetes mellitus), [facility] 2 [facility]     Duodenal ulcer     E. coli sepsis ([facility] 02/2014    Elevated lipids     Elevated pancreatic enzyme     Gastritis     GERD (gastroesophageal reflux disease)     Hiatal hernia     History of colon polyps     HTN (hypertension)     Hypercholesterolemia     Hypothyroidism     Nausea and vomiting     Osteoarthritis     other     Non Specified Ulcers    Pernicious anemia     Skin cancer     History OF.  Venous angioma     left-parietal         SURGICAL HISTORY       Past Surgical History:   Procedure Laterality Date    BREAST SURGERY      BG Biopsy    CHOLECYSTECTOMY      COLONOSCOPY  3/16/2005    DR. OTERO    COLONOSCOPY  5/21/2010    tubular adenoma    COLONOSCOPY  12/31/2015    Dr Faye Stevens, normal, 5 yr recall    FRACTURE SURGERY      x 2 right ankle    HAND SURGERY Right 07/2012    Dr [place] removed due to arthritis and tendon repair    HYSTERECTOMY, TOTAL ABDOMINAL  1969    KNEE SURGERY Right     LUMBAR [place] [address] [place] SURGERY  11/2008    L5-S1-Dr Renetta Helton LUMBAR SPINE SURGERY  2005    OTHER SURGICAL HISTORY Right     Basa Cell Lesion Removal-elbow    OTHER SURGICAL HISTORY Right 2002    ORIF ankle    MT EGD INTRMURAL NEEDLE ASPIR/BIOP ALTERED ANATOMY N/A 8/15/2018    Dr Cardenas Fire EGD TRANSORAL BIOPSY SINGLE/MULTIPLE N/A 12/6/2017    [facility]     ROTATOR CUFF REPAIR Left 12/2013    Dr Autumn Edward  5/19/2005    DR. KIM    UPPER GASTROINTESTINAL ENDOSCOPY  10/22/2009    peptic stricture, schatzki ring dilated 46 fr, sm hiatal hernia, gastritis    UPPER GASTROINTESTINAL ENDOSCOPY  10/01/2014    Dr Hauser-empiric dilation, urease neg    UPPER GASTROINTESTINAL ENDOSCOPY N/A 12/18/2015    Dr Faye Stevens, stricture/ulcers, repeat 8 wks    UPPER GASTROINTESTINAL ENDOSCOPY  12/6/2017    Dr Hauser-w/dilation over wire, [address] [place]-hiatal hernia, distal esophageal stricture    UPPER GASTROINTESTINAL ENDOSCOPY  8/15/2018    Dr SANYA Finley-w/dilation over wire-[facility] and EUS-Possible duodenitis, gastritis, duodenal stricture likely peptic stricture nature, hiatal hernia    UPPER GASTROINTESTINAL ENDOSCOPY  8/15/2018    Dr SANYA Finley-w/dilation over wire-[facility] and EUS-Possible duodenitis, gastritis, duodenal stricture likely peptic stricture nature, hiatal hernia         CURRENT MEDICATIONS     Discharge Medication List as of 5/3/2022  7:14 AM      CONTINUE these medications which have NOT CHANGED    Details   ezetimibe (ZETIA) 10 MG tablet Take 10 mg by mouth dailyHistorical Med      vitamin D (ERGOCALCIFEROL) 1.25 MG (32648 UT) CAPS capsule Take 50,000 Units by mouth once a weekHistorical Med      pantoprazole (PROTONIX) 40 MG tablet Take 40 mg by mouth dailyHistorical Med      aspirin EC 81 MG EC tablet Take 1 tablet by mouth daily, Disp-90 tablet, R-1Normal      clopidogrel (PLAVIX) 75 MG tablet Take 1 tablet by mouth daily, Disp-90 tablet, R-1Normal      carvedilol (COREG) 3.125 MG tablet Take 1 tablet by mouth 2 times daily (with meals), Disp-60 tablet, R-3Normal      insulin glargine (LANTUS) 100 UNIT/ML injection vial Inject 16 Units into the skin nightlyHistorical Med      celecoxib (CELEBREX) 200 MG capsule Take 200 mg by mouth dailyHistorical Med      levothyroxine (SYNTHROID) 75 MCG tablet Take 75 mcg by mouth Daily. MetFORMIN HCl (GLUCOPHAGE PO) Take 1,000 mg by mouth daily Historical Med      LISINOPRIL PO Take 10 mg by mouth daily.              ALLERGIES     Perflutren lipid microsphere, Januvia [sitagliptin], and Vancomycin    FAMILY HISTORY       Family History   Problem Relation Age of Onset    Breast Cancer Mother     Colon Cancer Neg Hx     Colon Polyps Neg Hx     Esophageal Cancer Neg Hx     Liver Disease Neg Hx     Liver Cancer Neg Hx     Rectal Cancer Neg Hx     Stomach Cancer Neg Hx           SOCIAL HISTORY       Social History     Socioeconomic History    Marital status:      Spouse name: None    Number of children: None    Years of education: None    Highest education level: None   Occupational History    None Tobacco Use    Smoking status: Former Smoker     Packs/day: 0.50     Years: 10.00     Pack years: 5.00     Types: Cigarettes     Quit date: 1989     Years since quittin.6    Smokeless tobacco: Never Used   Vaping Use    Vaping Use: Never used   Substance and Sexual Activity    Alcohol use: Yes     Comment: glass of wine a couple times per month    Drug use: No    Sexual activity: None   Other Topics Concern    None   Social History Narrative    None     Social Determinants of Health     Financial Resource Strain:     Difficulty of Paying Living Expenses: Not on file   Food Insecurity:     Worried About Running Out of Food in the Last Year: Not on file    [place] of Food in the Last Year: Not on file   Transportation Needs:     Lack of Transportation (Medical): Not on file    Lack of Transportation (Non-Medical):  Not on file   Physical Activity:     Days of Exercise per Week: Not on file    Minutes of Exercise per Session: Not on file   Stress:     Feeling of Stress : Not on file   Social Connections:     Frequency of Communication with Friends and Family: Not on file    Frequency of Social Gatherings with Friends and Family: Not on file    Attends Zoroastrian Services: Not on file    Active Member of [address] [place] [address] [employer] or Organizations: Not on file    Attends Club or Organization Meetings: Not on file    Marital Status: Not on file   Intimate Partner Violence:     Fear of Current or Ex-Partner: Not on file    Emotionally Abused: Not on file    Physically Abused: Not on file    Sexually Abused: Not on file   Housing Stability:     Unable to Pay for Housing in the Last Year: Not on file    Number of [place] in the Last Year: Not on file    Unstable Housing in the Last Year: Not on file       SCREENINGS    [place] Coma Scale  Eye Opening: Spontaneous  Best Verbal Response: Oriented  Best Motor Response: Obeys commands  [place] Coma Scale Score: 15        PHYSICAL EXAM    (up to 7 for level 4, 8 or more for level 5)     ED Triage Vitals   BP Temp Temp Source Pulse Resp SpO2 Height Weight   05/03/22 0434 05/03/22 0434 05/03/22 0434 05/03/22 0434 05/03/22 0434 05/03/22 0434 05/03/22 0431 05/03/22 0431   (!) 149/72 98.1 °F (36.7 °C) Oral 85 18 94 % 5' 4.5\" (1.638 m) 174 lb (78.9 kg)       Physical Exam  Vitals and nursing note reviewed. HENT:      Head: Atraumatic. Mouth/Throat:      Mouth: Mucous membranes are not dry. Eyes:      General: No scleral icterus. Pupils: Pupils are equal, round, and reactive to light. Neck:      Trachea: No tracheal deviation. Cardiovascular:      Rate and Rhythm: Normal rate and regular rhythm. Heart sounds: Normal heart sounds. No murmur heard. Pulmonary:      Effort: Pulmonary effort is normal. No respiratory distress. Breath sounds: Normal breath sounds. No stridor. Abdominal:      General: There is no distension. Palpations: Abdomen is soft. Tenderness: There is abdominal tenderness. There is no guarding. Skin:     Capillary Refill: Capillary refill takes less than 2 seconds. Coloration: Skin is not pale. Findings: No rash. Neurological:      Mental Status: She is alert and oriented to person, place, and time. Psychiatric:         Behavior: Behavior is cooperative. DIAGNOSTIC RESULTS       RADIOLOGY:  Non-plain film images such as CT, Ultrasound and MRI are read by the radiologist. Plain radiographic images are visualized and preliminarily interpreted bythe emergency physician with the below findings:        CT ABDOMEN PELVIS W IV CONTRAST Additional Contrast? None   Final Result   1. Findings suggestive of urinary tract infection, with mild   urothelial thickening at the left renal pelvis, mild bladder wall   thickening. Clinical correlation is recommended. No evidence of   hydronephrosis is identified.    2. Previous cholecystectomy with a mild reservoir effect of the   extrahepatic bile ducts.   3. Evidence of previous hysterectomy. 4. Nonvisualization of the appendix. 5. Mild sigmoid diverticulosis without evidence of acute   diverticulitis. 6. Small sliding-type hiatal hernia. Signed by Dr Julio Baker. Joy              LABS:  Labs Reviewed   CULTURE, URINE - Abnormal; Notable for the following components:       Result Value    Urine Culture, Routine >100,000 CFU/ml (*)     Organism Escherichia coli (*)     Organism Klebsiella pneumoniae (*)     All other components within normal limits    Narrative:     ORDER#: [id number]                          ORDERED BY: Daryle Makua  SOURCE: Urine Clean Catch                  COLLECTED:  05/03/22 05:10  ANTIBIOTICS AT KYM.:                      RECEIVED :  05/03/22 05:15   CBC WITH AUTO DIFFERENTIAL - Abnormal; Notable for the following components:    Hemoglobin 11.9 (*)     MCHC 31.5 (*)     Neutrophils % 68.4 (*)     All other components within normal limits   COMPREHENSIVE METABOLIC PANEL - Abnormal; Notable for the following components:    Glucose 157 (*)     CREATININE 1.4 (*)     GFR Non- 36 (*)     GFR  44 (*)     All other components within normal limits   URINALYSIS WITH REFLEX TO CULTURE - Abnormal; Notable for the following components:    Clarity, UA CLOUDY (*)     Blood, Urine SMALL (*)     Protein, UA 30 (*)     Nitrite, Urine POSITIVE (*)     Leukocyte Esterase, Urine MODERATE (*)     All other components within normal limits   MICROSCOPIC URINALYSIS - Abnormal; Notable for the following components:    Bacteria, UA 4+ (*)     Crystals, UA NEG (*)     WBC, UA 65 (*)     RBC, UA 6 (*)     All other components within normal limits   LIPASE       All other labs were within normal range or not returned as of this dictation.     EMERGENCY DEPARTMENT COURSE and DIFFERENTIAL DIAGNOSIS/MDM:   Vitals:    Vitals:    05/03/22 0431 05/03/22 0434   BP:  (!) 149/72   Pulse:  85   Resp:  18   Temp:  98.1 °F (36.7 °C) TempSrc:  Oral   SpO2:  94%   Weight: 174 lb (78.9 kg)    Height: 5' 4.5\" (1.638 m)        MDM    Reassessment    Patient's urinalysis appears consistent with urinary tract infection. PROCEDURES:  Unless otherwise noted below, none     Procedures    FINAL IMPRESSION      1.  Acute cystitis without hematuria          DISPOSITION/PLAN   DISPOSITION Decision To Discharge 05/03/2022 07:12:38 AM      PATIENT REFERRED TO:  Lex Canavan, DO  [place] [id number]            DISCHARGE MEDICATIONS:  Discharge Medication List as of 5/3/2022  7:14 AM      START taking these medications    Details   cefdinir (OMNICEF) 300 MG capsule Take 1 capsule by mouth 2 times daily for 7 days, Disp-14 capsule, R-0Normal                (Please note that portions of this note were completed with a voice recognition program.  Efforts were made to edit thedictations but occasionally words are mis-transcribed.)    Fabiana Conde MD (electronically signed)  Attending Emergency Physician         Fabiana Conde MD  05/12/22 7508

## 2022-05-06 LAB
ORGANISM: ABNORMAL
ORGANISM: ABNORMAL
URINE CULTURE, ROUTINE: ABNORMAL

## 2022-05-11 ENCOUNTER — APPOINTMENT (OUTPATIENT)
Dept: GENERAL RADIOLOGY | Age: 82
End: 2022-05-11
Payer: MEDICARE

## 2022-05-11 ENCOUNTER — HOSPITAL ENCOUNTER (OUTPATIENT)
Age: 82
Setting detail: OBSERVATION
Discharge: HOME | End: 2022-05-13
Attending: HOSPITALIST | Admitting: HOSPITALIST
Payer: MEDICARE

## 2022-05-11 ENCOUNTER — TELEPHONE (OUTPATIENT)
Dept: CARDIOLOGY CLINIC | Age: 82
End: 2022-05-11

## 2022-05-11 DIAGNOSIS — Z79.4 TYPE 2 DIABETES MELLITUS WITH CHRONIC KIDNEY DISEASE, WITH LONG-TERM CURRENT USE OF INSULIN, UNSPECIFIED CKD STAGE (HCC): ICD-10-CM

## 2022-05-11 DIAGNOSIS — Z87.11 HISTORY OF GASTRIC ULCER: ICD-10-CM

## 2022-05-11 DIAGNOSIS — E11.22 TYPE 2 DIABETES MELLITUS WITH CHRONIC KIDNEY DISEASE, WITH LONG-TERM CURRENT USE OF INSULIN, UNSPECIFIED CKD STAGE (HCC): ICD-10-CM

## 2022-05-11 DIAGNOSIS — I25.2 HISTORY OF MI (MYOCARDIAL INFARCTION): ICD-10-CM

## 2022-05-11 DIAGNOSIS — R10.13 MIDEPIGASTRIC PAIN: ICD-10-CM

## 2022-05-11 DIAGNOSIS — R07.9 CHEST PAIN, UNSPECIFIED TYPE: Primary | ICD-10-CM

## 2022-05-11 LAB
ALBUMIN SERPL-MCNC: 4.5 G/DL (ref 3.5–5.2)
ALP BLD-CCNC: 110 U/L (ref 35–104)
ALT SERPL-CCNC: 21 U/L (ref 5–33)
ANION GAP SERPL CALCULATED.3IONS-SCNC: 13 MMOL/L (ref 7–19)
AST SERPL-CCNC: 13 U/L (ref 5–32)
BACTERIA: NEGATIVE /HPF
BASOPHILS ABSOLUTE: 0.1 K/UL (ref 0–0.2)
BASOPHILS RELATIVE PERCENT: 0.8 % (ref 0–1)
BILIRUB SERPL-MCNC: 0.7 MG/DL (ref 0.2–1.2)
BILIRUBIN URINE: NEGATIVE
BLOOD, URINE: NEGATIVE
BUN BLDV-MCNC: 18 MG/DL (ref 8–23)
CALCIUM SERPL-MCNC: 9.7 MG/DL (ref 8.8–10.2)
CHLORIDE BLD-SCNC: 104 MMOL/L (ref 98–111)
CLARITY: CLEAR
CO2: 24 MMOL/L (ref 22–29)
COLOR: YELLOW
CREAT SERPL-MCNC: 1.4 MG/DL (ref 0.5–0.9)
CRYSTALS, UA: ABNORMAL /HPF
D DIMER: 0.38 UG/ML FEU (ref 0–0.48)
EOSINOPHILS ABSOLUTE: 0.2 K/UL (ref 0–0.6)
EOSINOPHILS RELATIVE PERCENT: 3.2 % (ref 0–5)
EPITHELIAL CELLS, UA: 2 /HPF (ref 0–5)
FOLATE: 14.4 NG/ML (ref 4.8–37.3)
GFR AFRICAN AMERICAN: 44
GFR NON-AFRICAN AMERICAN: 36
GLUCOSE BLD-MCNC: 103 MG/DL (ref 70–99)
GLUCOSE BLD-MCNC: 108 MG/DL (ref 70–99)
GLUCOSE BLD-MCNC: 122 MG/DL (ref 74–109)
GLUCOSE BLD-MCNC: 130 MG/DL (ref 70–99)
GLUCOSE URINE: NEGATIVE MG/DL
HCT VFR BLD CALC: 38 % (ref 37–47)
HEMOGLOBIN: 12 G/DL (ref 12–16)
HYALINE CASTS: 1 /HPF (ref 0–8)
IMMATURE GRANULOCYTES #: 0 K/UL
KETONES, URINE: NEGATIVE MG/DL
LEUKOCYTE ESTERASE, URINE: ABNORMAL
LYMPHOCYTES ABSOLUTE: 1.7 K/UL (ref 1.1–4.5)
LYMPHOCYTES RELATIVE PERCENT: 26.8 % (ref 20–40)
MAGNESIUM: 1.8 MG/DL (ref 1.6–2.4)
MCH RBC QN AUTO: 27.2 PG (ref 27–31)
MCHC RBC AUTO-ENTMCNC: 31.6 G/DL (ref 33–37)
MCV RBC AUTO: 86.2 FL (ref 81–99)
MONOCYTES ABSOLUTE: 0.4 K/UL (ref 0–0.9)
MONOCYTES RELATIVE PERCENT: 6.3 % (ref 0–10)
NEUTROPHILS ABSOLUTE: 4 K/UL (ref 1.5–7.5)
NEUTROPHILS RELATIVE PERCENT: 62.6 % (ref 50–65)
NITRITE, URINE: NEGATIVE
PDW BLD-RTO: 11.5 % (ref 11.5–14.5)
PERFORMED ON: ABNORMAL
PH UA: 7 (ref 5–8)
PLATELET # BLD: 302 K/UL (ref 130–400)
PMV BLD AUTO: 9.2 FL (ref 9.4–12.3)
POTASSIUM REFLEX MAGNESIUM: 3.9 MMOL/L (ref 3.5–5)
PRO-BNP: 188 PG/ML (ref 0–1800)
PROTEIN UA: NEGATIVE MG/DL
RBC # BLD: 4.41 M/UL (ref 4.2–5.4)
RBC UA: 1 /HPF (ref 0–4)
SODIUM BLD-SCNC: 141 MMOL/L (ref 136–145)
SPECIFIC GRAVITY UA: 1.01 (ref 1–1.03)
TOTAL PROTEIN: 7.2 G/DL (ref 6.6–8.7)
TROPONIN: <0.01 NG/ML (ref 0–0.03)
TROPONIN: <0.01 NG/ML (ref 0–0.03)
TSH SERPL DL<=0.05 MIU/L-ACNC: 2.3 UIU/ML (ref 0.27–4.2)
UROBILINOGEN, URINE: 0.2 E.U./DL
VITAMIN B-12: 1036 PG/ML (ref 211–946)
VITAMIN D 25-HYDROXY: >100 NG/ML
WBC # BLD: 6.3 K/UL (ref 4.8–10.8)
WBC UA: 5 /HPF (ref 0–5)

## 2022-05-11 PROCEDURE — 6370000000 HC RX 637 (ALT 250 FOR IP): Performed by: PHYSICIAN ASSISTANT

## 2022-05-11 PROCEDURE — 85025 COMPLETE CBC W/AUTO DIFF WBC: CPT

## 2022-05-11 PROCEDURE — 96374 THER/PROPH/DIAG INJ IV PUSH: CPT

## 2022-05-11 PROCEDURE — 2580000003 HC RX 258: Performed by: HOSPITALIST

## 2022-05-11 PROCEDURE — G0378 HOSPITAL OBSERVATION PER HR: HCPCS

## 2022-05-11 PROCEDURE — 82607 VITAMIN B-12: CPT

## 2022-05-11 PROCEDURE — 83735 ASSAY OF MAGNESIUM: CPT

## 2022-05-11 PROCEDURE — 93005 ELECTROCARDIOGRAM TRACING: CPT | Performed by: INTERNAL MEDICINE

## 2022-05-11 PROCEDURE — 82306 VITAMIN D 25 HYDROXY: CPT

## 2022-05-11 PROCEDURE — 36415 COLL VENOUS BLD VENIPUNCTURE: CPT

## 2022-05-11 PROCEDURE — 93005 ELECTROCARDIOGRAM TRACING: CPT | Performed by: PHYSICIAN ASSISTANT

## 2022-05-11 PROCEDURE — 87086 URINE CULTURE/COLONY COUNT: CPT

## 2022-05-11 PROCEDURE — 99285 EMERGENCY DEPT VISIT HI MDM: CPT

## 2022-05-11 PROCEDURE — 6360000002 HC RX W HCPCS: Performed by: HOSPITALIST

## 2022-05-11 PROCEDURE — 82746 ASSAY OF FOLIC ACID SERUM: CPT

## 2022-05-11 PROCEDURE — 81001 URINALYSIS AUTO W/SCOPE: CPT

## 2022-05-11 PROCEDURE — 99213 OFFICE O/P EST LOW 20 MIN: CPT | Performed by: INTERNAL MEDICINE

## 2022-05-11 PROCEDURE — 84443 ASSAY THYROID STIM HORMONE: CPT

## 2022-05-11 PROCEDURE — 83880 ASSAY OF NATRIURETIC PEPTIDE: CPT

## 2022-05-11 PROCEDURE — 85379 FIBRIN DEGRADATION QUANT: CPT

## 2022-05-11 PROCEDURE — 80053 COMPREHEN METABOLIC PANEL: CPT

## 2022-05-11 PROCEDURE — 71045 X-RAY EXAM CHEST 1 VIEW: CPT

## 2022-05-11 PROCEDURE — 82947 ASSAY GLUCOSE BLOOD QUANT: CPT

## 2022-05-11 PROCEDURE — 84484 ASSAY OF TROPONIN QUANT: CPT

## 2022-05-11 PROCEDURE — 6370000000 HC RX 637 (ALT 250 FOR IP): Performed by: HOSPITALIST

## 2022-05-11 RX ORDER — ENOXAPARIN SODIUM 100 MG/ML
40 INJECTION SUBCUTANEOUS DAILY
Status: DISCONTINUED | OUTPATIENT
Start: 2022-05-12 | End: 2022-05-13 | Stop reason: HOSPADM

## 2022-05-11 RX ORDER — ACETAMINOPHEN 325 MG/1
650 TABLET ORAL EVERY 6 HOURS PRN
Status: DISCONTINUED | OUTPATIENT
Start: 2022-05-11 | End: 2022-05-13

## 2022-05-11 RX ORDER — SODIUM CHLORIDE 9 MG/ML
INJECTION, SOLUTION INTRAVENOUS PRN
Status: DISCONTINUED | OUTPATIENT
Start: 2022-05-11 | End: 2022-05-13

## 2022-05-11 RX ORDER — ONDANSETRON 4 MG/1
4 TABLET, ORALLY DISINTEGRATING ORAL EVERY 8 HOURS PRN
Status: DISCONTINUED | OUTPATIENT
Start: 2022-05-11 | End: 2022-05-13 | Stop reason: HOSPADM

## 2022-05-11 RX ORDER — CLOPIDOGREL BISULFATE 75 MG/1
75 TABLET ORAL DAILY
Status: DISCONTINUED | OUTPATIENT
Start: 2022-05-11 | End: 2022-05-13 | Stop reason: HOSPADM

## 2022-05-11 RX ORDER — CARVEDILOL 3.12 MG/1
3.12 TABLET ORAL 2 TIMES DAILY WITH MEALS
Status: DISCONTINUED | OUTPATIENT
Start: 2022-05-11 | End: 2022-05-13 | Stop reason: HOSPADM

## 2022-05-11 RX ORDER — SODIUM CHLORIDE 0.9 % (FLUSH) 0.9 %
5-40 SYRINGE (ML) INJECTION EVERY 12 HOURS SCHEDULED
Status: DISCONTINUED | OUTPATIENT
Start: 2022-05-11 | End: 2022-05-13

## 2022-05-11 RX ORDER — ATORVASTATIN CALCIUM 40 MG/1
40 TABLET, FILM COATED ORAL NIGHTLY
Status: DISCONTINUED | OUTPATIENT
Start: 2022-05-11 | End: 2022-05-13 | Stop reason: HOSPADM

## 2022-05-11 RX ORDER — LEVOTHYROXINE SODIUM 0.07 MG/1
75 TABLET ORAL DAILY
Status: DISCONTINUED | OUTPATIENT
Start: 2022-05-11 | End: 2022-05-13 | Stop reason: HOSPADM

## 2022-05-11 RX ORDER — LISINOPRIL 10 MG/1
10 TABLET ORAL DAILY
Status: DISCONTINUED | OUTPATIENT
Start: 2022-05-11 | End: 2022-05-13 | Stop reason: HOSPADM

## 2022-05-11 RX ORDER — EZETIMIBE 10 MG/1
10 TABLET ORAL DAILY
Status: DISCONTINUED | OUTPATIENT
Start: 2022-05-11 | End: 2022-05-13 | Stop reason: HOSPADM

## 2022-05-11 RX ORDER — ONDANSETRON 2 MG/ML
4 INJECTION INTRAMUSCULAR; INTRAVENOUS EVERY 6 HOURS PRN
Status: DISCONTINUED | OUTPATIENT
Start: 2022-05-11 | End: 2022-05-13 | Stop reason: HOSPADM

## 2022-05-11 RX ORDER — ASPIRIN 325 MG
325 TABLET ORAL ONCE
Status: COMPLETED | OUTPATIENT
Start: 2022-05-11 | End: 2022-05-11

## 2022-05-11 RX ORDER — SODIUM CHLORIDE 0.9 % (FLUSH) 0.9 %
10 SYRINGE (ML) INJECTION PRN
Status: DISCONTINUED | OUTPATIENT
Start: 2022-05-11 | End: 2022-05-13

## 2022-05-11 RX ORDER — ERGOCALCIFEROL 1.25 MG/1
50000 CAPSULE ORAL WEEKLY
Status: DISCONTINUED | OUTPATIENT
Start: 2022-05-11 | End: 2022-05-13 | Stop reason: HOSPADM

## 2022-05-11 RX ORDER — ASPIRIN 81 MG/1
81 TABLET, CHEWABLE ORAL DAILY
Status: DISCONTINUED | OUTPATIENT
Start: 2022-05-12 | End: 2022-05-13 | Stop reason: HOSPADM

## 2022-05-11 RX ORDER — NITROGLYCERIN 0.4 MG/1
0.4 TABLET SUBLINGUAL EVERY 5 MIN PRN
Status: DISCONTINUED | OUTPATIENT
Start: 2022-05-11 | End: 2022-05-13 | Stop reason: HOSPADM

## 2022-05-11 RX ORDER — ACETAMINOPHEN 650 MG/1
650 SUPPOSITORY RECTAL EVERY 6 HOURS PRN
Status: DISCONTINUED | OUTPATIENT
Start: 2022-05-11 | End: 2022-05-13 | Stop reason: HOSPADM

## 2022-05-11 RX ORDER — PANTOPRAZOLE SODIUM 40 MG/1
40 TABLET, DELAYED RELEASE ORAL DAILY
Status: DISCONTINUED | OUTPATIENT
Start: 2022-05-11 | End: 2022-05-13 | Stop reason: HOSPADM

## 2022-05-11 RX ADMIN — ASPIRIN 325 MG: 325 TABLET ORAL at 10:17

## 2022-05-11 RX ADMIN — ATORVASTATIN CALCIUM 40 MG: 40 TABLET, FILM COATED ORAL at 21:01

## 2022-05-11 RX ADMIN — SODIUM CHLORIDE, PRESERVATIVE FREE 10 ML: 5 INJECTION INTRAVENOUS at 12:58

## 2022-05-11 RX ADMIN — NITROGLYCERIN 0.4 MG: 0.4 TABLET, ORALLY DISINTEGRATING SUBLINGUAL at 10:17

## 2022-05-11 RX ADMIN — CARVEDILOL 3.12 MG: 3.12 TABLET, FILM COATED ORAL at 12:58

## 2022-05-11 RX ADMIN — CARVEDILOL 3.12 MG: 3.12 TABLET, FILM COATED ORAL at 17:07

## 2022-05-11 RX ADMIN — WATER 1000 MG: 1 INJECTION INTRAMUSCULAR; INTRAVENOUS; SUBCUTANEOUS at 12:58

## 2022-05-11 ASSESSMENT — ENCOUNTER SYMPTOMS
ABDOMINAL PAIN: 0
SHORTNESS OF BREATH: 1
NAUSEA: 0
VOMITING: 0
BACK PAIN: 0
DIARRHEA: 0
COUGH: 0

## 2022-05-11 ASSESSMENT — HEART SCORE: ECG: 1

## 2022-05-11 ASSESSMENT — PAIN SCALES - GENERAL: PAINLEVEL_OUTOF10: 5

## 2022-05-11 ASSESSMENT — PAIN DESCRIPTION - LOCATION: LOCATION: CHEST

## 2022-05-11 NOTE — ED PROVIDER NOTES
[facility] EMERGENCY DEPT  eMERGENCY dEPARTMENT eNCOUnter      Pt Name: Esther Morrissey  MRN: [id number]  [place] 1940  Date of evaluation: 5/11/2022  Provider: Saira Gerard Dr       Chief Complaint   Patient presents with    Chest Pain     since this am         HISTORY OF PRESENT ILLNESS   (Location/Symptom, Timing/Onset,Context/Setting, Quality, Duration, Modifying Factors, Severity)  Note limiting factors. Esther Morrissey is a [de-identified] y.o. female with history including high cholesterol, type 2 diabetes, degenerative disc disease, GERD, acute MI June 2021, and esophageal stricture who presents to the emergency department with complaint of chest pain. The chest pain began earlier this morning. She had a follow-up set with Dr. Mike Mckeon this morning but after finding about her new acute chest pain was sent to the ER. Pain is on the left side and comes in waves of sharp crushing pain that radiates to the right side. She denies any associated headache or dizziness but does have associated shortness of breath. Denies fever, chills, or URI symptoms. On Plavix for anticoagulation. HPI    NursingNotes were reviewed. REVIEW OF SYSTEMS    (2-9 systems for level 4, 10 or more for level 5)     Review of Systems   Constitutional: Negative for chills and fever. Respiratory: Positive for shortness of breath. Negative for cough. Cardiovascular: Positive for chest pain. Gastrointestinal: Negative for abdominal pain, diarrhea, nausea and vomiting. Musculoskeletal: Negative for back pain. Neurological: Negative for dizziness, weakness, numbness and headaches. All other systems reviewed and are negative.            PAST MEDICALHISTORY     Past Medical History:   Diagnosis Date    Arthritis     Fernandes's esophagus     DDD (degenerative disc disease), lumbar     DM [facility]diabetes mellitus[facility] 2 ([facility] [address]     Duodenal ulcer     E. coli sepsis ([facility] [address] 02/2014    Elevated lipids     Elevated pancreatic enzyme     Gastritis     GERD (gastroesophageal reflux disease)     Hiatal hernia     History of colon polyps     HTN (hypertension)     Hypercholesterolemia     Hypothyroidism     Nausea and vomiting     Osteoarthritis     other     Non Specified Ulcers    Pernicious anemia     Skin cancer     History OF.  Venous angioma     left-parietal         SURGICAL HISTORY       Past Surgical History:   Procedure Laterality Date    BREAST SURGERY      BG Biopsy    CHOLECYSTECTOMY      COLONOSCOPY  3/16/2005    DR. OTERO    COLONOSCOPY  5/21/2010    tubular adenoma    COLONOSCOPY  12/31/2015    Dr Becky Celaya, normal, 5 yr recall    FRACTURE SURGERY      x 2 right ankle    HAND SURGERY Right 07/2012    Dr Qing Rivas removed due to arthritis and tendon repair    HYSTERECTOMY, TOTAL ABDOMINAL  1969    KNEE SURGERY Right     LUMBAR [place] [address] [place] SURGERY  11/2008    L5-S1-Dr Wood Mems LUMBAR SPINE SURGERY  2005    OTHER SURGICAL HISTORY Right     Basa Cell Lesion Removal-elbow    OTHER SURGICAL HISTORY Right 2002    ORIF ankle    NE EGD INTRMURAL NEEDLE ASPIR/BIOP ALTERED ANATOMY N/A 8/15/2018    Dr Corrine Valderrama EGD TRANSORAL BIOPSY SINGLE/MULTIPLE N/A 12/6/2017    EGD BIOPSY performed by Kermit Grove DO at [facility] Right     ROTATOR CUFF REPAIR Left 12/2013    Dr Zhen Cowan  5/19/2005    DR. KIM    UPPER GASTROINTESTINAL ENDOSCOPY  10/22/2009    peptic stricture, schatzki ring dilated 46 fr, sm hiatal hernia, gastritis    UPPER GASTROINTESTINAL ENDOSCOPY  10/01/2014    Dr Hauser-empiric dilation, urease neg    UPPER GASTROINTESTINAL ENDOSCOPY N/A 12/18/2015    Dr Becky Celaya, stricture/ulcers, repeat 8 wks    UPPER GASTROINTESTINAL ENDOSCOPY  12/6/2017    Dr Hauser-w/dilation over wire, 46 Italian-hiatal hernia, distal esophageal stricture    UPPER GASTROINTESTINAL ENDOSCOPY  8/15/2018    Dr Nik Finley-w/dilation over wire-[facility] and EUS-Possible duodenitis, gastritis, duodenal stricture likely peptic stricture nature, hiatal hernia    UPPER GASTROINTESTINAL ENDOSCOPY  8/15/2018    Dr SANYA Finley-w/dilation over wire-[address] [facility] and EUS-Possible duodenitis, gastritis, duodenal stricture likely peptic stricture nature, hiatal hernia         CURRENT MEDICATIONS     Previous Medications    ASPIRIN EC 81 MG EC TABLET    Take 1 tablet by mouth daily    CARVEDILOL (COREG) 3.125 MG TABLET    Take 1 tablet by mouth 2 times daily (with meals)    CELECOXIB (CELEBREX) 200 MG CAPSULE    Take 200 mg by mouth daily    CLOPIDOGREL (PLAVIX) 75 MG TABLET    Take 1 tablet by mouth daily    EZETIMIBE (ZETIA) 10 MG TABLET    Take 10 mg by mouth daily    INSULIN GLARGINE (LANTUS) 100 UNIT/ML INJECTION VIAL    Inject 16 Units into the skin nightly    LEVOTHYROXINE (SYNTHROID) 75 MCG TABLET    Take 75 mcg by mouth Daily. LISINOPRIL PO    Take 10 mg by mouth daily.     METFORMIN HCL (GLUCOPHAGE PO)    Take 1,000 mg by mouth daily     PANTOPRAZOLE (PROTONIX) 40 MG TABLET    Take 40 mg by mouth daily    VITAMIN D (ERGOCALCIFEROL) 1.25 MG (86610 UT) CAPS CAPSULE    Take 50,000 Units by mouth once a week       ALLERGIES     Perflutren lipid microsphere, Januvia [sitagliptin], and Vancomycin    FAMILY HISTORY       Family History   Problem Relation Age of Onset    Breast Cancer Mother     Colon Cancer Neg Hx     Colon Polyps Neg Hx     Esophageal Cancer Neg Hx     Liver Disease Neg Hx     Liver Cancer Neg Hx     Rectal Cancer Neg Hx     Stomach Cancer Neg Hx           SOCIAL HISTORY       Social History     Socioeconomic History    Marital status:      Spouse name: None    Number of children: None    Years of education: None    Highest education level: None   Occupational History    None   Tobacco Use    Smoking status: Former Smoker     Packs/day: 0.50     Years: 10.00     Pack years: 5.00     Types: Cigarettes     Quit date: 1989     Years since quittin.6    Smokeless tobacco: Never Used   Vaping Use    Vaping Use: Never used   Substance and Sexual Activity    Alcohol use: Yes     Comment: glass of wine a couple times per month    Drug use: No    Sexual activity: None   Other Topics Concern    None   Social History Narrative    None     Social Determinants of Health     Financial Resource Strain:     Difficulty of Paying Living Expenses: Not on file   Food Insecurity:     Worried About Running Out of Food in the Last Year: Not on file    [place] of Food in the Last Year: Not on file   Transportation Needs:     Lack of Transportation (Medical): Not on file    Lack of Transportation (Non-Medical):  Not on file   Physical Activity:     Days of Exercise per Week: Not on file    Minutes of Exercise per Session: Not on file   Stress:     Feeling of Stress : Not on file   Social Connections:     Frequency of Communication with Friends and Family: Not on file    Frequency of Social Gatherings with Friends and Family: Not on file    Attends Yarsanism Services: Not on file    Active Member of [address] [place] [address] or Organizations: Not on file    Attends Club or Organization Meetings: Not on file    Marital Status: Not on file   Intimate Partner Violence:     Fear of Current or Ex-Partner: Not on file    Emotionally Abused: Not on file    Physically Abused: Not on file    Sexually Abused: Not on file   Housing Stability:     Unable to Pay for Housing in the Last Year: Not on file    Number of [place] in the Last Year: Not on file    Unstable Housing in the Last Year: Not on file       SCREENINGS    [place] Coma Scale  Eye Opening: Spontaneous  Best Verbal Response: Oriented  Best Motor Response: Obeys commands  Denise Coma Scale Score: 15 Heart Score for chest pain patients  History: Highly Suspicious  ECG: Non-Specifc repolarization disturbance/LBTB/PM  Patient Age: > 65 years  *Risk factors for Atherosclerotic disease: Coronary Artery Disease,Obesity,Hypertension,Diabetes Mellitus  Risk Factors: > 3 Risk factors or history of atherosclerotic disease*  Troponin: < 1X normal limit  Heart Score Total: 7      PHYSICAL EXAM    (up to 7 for level 4, 8 or more for level 5)     ED Triage Vitals [05/11/22 0913]   BP Temp Temp src Pulse Resp SpO2 Height Weight   (!) 158/72 97.8 °F (36.6 °C) -- 73 18 98 % 5' 4\" (1.626 m) 175 lb (79.4 kg)       Physical Exam  Vitals and nursing note reviewed. Constitutional:       General: She is not in acute distress. Appearance: Normal appearance. She is obese. She is not ill-appearing, toxic-appearing or diaphoretic. HENT:      Head: Normocephalic and atraumatic. Cardiovascular:      Rate and Rhythm: Normal rate and regular rhythm. Pulses: Normal pulses. Heart sounds: Normal heart sounds. Pulmonary:      Effort: Pulmonary effort is normal. No respiratory distress. Breath sounds: Normal breath sounds. Chest:      Chest wall: No tenderness. Abdominal:      General: There is no distension. Palpations: Abdomen is soft. Tenderness: There is no abdominal tenderness. Musculoskeletal:      Cervical back: Normal range of motion and neck supple. No rigidity or tenderness. Right lower leg: No edema. Left lower leg: No edema. Lymphadenopathy:      Cervical: No cervical adenopathy. Skin:     General: Skin is warm and dry. Neurological:      General: No focal deficit present. Mental Status: She is alert and oriented to person, place, and time. Cranial Nerves: No cranial nerve deficit. Motor: No weakness.       Coordination: Coordination normal.      Gait: Gait normal.      Comments: Appears anxious         DIAGNOSTIC RESULTS     EKG: All EKG's areinterpreted by the Emergency Department Physician who either signs or Co-signs this chart in the absence of a cardiologist.    EKG interpreted by attending, normal sinus rhythm at a rate of 62, no STEMI or acute ischemia, normal P axis, , QTc 413    EKG repeated at 1002 after worsening chest pain, attending, no STEMI or acute ischemia, prolong QT C at 616 suspected due to artifact    RADIOLOGY:  Non-plain film images such as CT, Ultrasound and MRI are read by the radiologist. Plain radiographic images are visualized and preliminarily interpreted bythe emergency physician with the below findings:    XR CHEST PORTABLE   Final Result   1. No acute findings. 2.  10 mm nodular density in the RIGHT upper lateral lung. This could   represent artifact but a pulmonary nodule is also possible. Recommend   a follow-up chest radiograph in one month to evaluate for persistence. If this finding persists, would then recommend a chest CT. Signed by Dr Kt Bass:  Labs Reviewed   CBC WITH AUTO DIFFERENTIAL - Abnormal; Notable for the following components:       Result Value    MCHC 31.6 (*)     MPV 9.2 (*)     All other components within normal limits   COMPREHENSIVE METABOLIC PANEL W/ REFLEX TO MG FOR LOW K - Abnormal; Notable for the following components:    Glucose 122 (*)     CREATININE 1.4 (*)     GFR Non- 36 (*)     GFR African American 44 (*)     Alkaline Phosphatase 110 (*)     All other components within normal limits   URINALYSIS WITH MICROSCOPIC - Abnormal; Notable for the following components:    Leukocyte Esterase, Urine TRACE (*)     Bacteria, UA NEGATIVE (*)     Crystals, UA NEG (*)     All other components within normal limits   TROPONIN   BRAIN NATRIURETIC PEPTIDE   D-DIMER, QUANTITATIVE       All other labs were within normal range or not returned as of this dictation.     EMERGENCY DEPARTMENT COURSE and DIFFERENTIAL DIAGNOSIS/MDM:   Vitals:    Vitals:    05/11/22 0913 05/11/22 0937   BP: (!) 158/72 (!) 140/91   Pulse: 73 72   Resp: 18 21   Temp: 97.8 °F (36.6 °C)    SpO2: 98% 98%   Weight: 175 lb (79.4 kg)    Height: 5' 4\" (1.626 m)        MDM  Patient is an 26-year-old female who presents to the ER for chest pain. On physical exam, she is holding her left chest.  Vital signs on arrival do show some mild hypertension. Her EKGs have been negative for STEMI or acute ischemia. Her first troponin is negative. Her heart score is elevated at 7 so she will warrant inpatient placement for further chest pain rule out. BNP is negative. Chest x-ray is negative for acute cardiopulmonary process including pneumonia or other consolidation. A negative D-dimer so have low suspicion for pulmonary embolism. CMP does showed some mild chronic elevation of her kidney function. Urinalysis does not show significant signs of infection. The patient is agreeable to the admission. Patient has received Nitro and ASA in the ED. Plan to admit to hospitalist    CONSULTS:  Hospitalist, Dr Harry Pond      1.  Chest pain, unspecified type          DISPOSITION/PLAN   DISPOSITION Decision To Admit 05/11/2022 10:09:25 AM      (Please note that portions of this note were completed with a voice recognition program.  Efforts were made to edit thedictations but occasionally words are mis-transcribed.)    JAYLIN Alonso (electronically signed)     Yohana Mohamud, [id number]  05/11/22 [id number]

## 2022-05-11 NOTE — H&P
[facility] - History & Physical      PCP: Ayden Sims DO    Date of Admission: 5/11/2022    Date of Service: 5/11/2022    Chief Complaint:  Chest pain    History Of Present Illness: The patient is a [de-identified] y.o. female with past medical history of Fernandes's esophagus, DM, hyperlipidemia, MI, and hypothyroidism who presented to [address] [place] [address] ED via advice from cardiology office complaining of chest pain. The patient indicates she had an MI last July with stents placed. She reports for a few months doing well however over the past 6 months she has developed significantly extreme fatigue. Patient reports coming to the ED approximately 1 week ago and being told she had a UTI and started on antibiotics. Patient reported 24 hours after beginning antibiotic she began to feel better she has not months. She indicates on the last day the antibiotics her fatigue returned and had worsened. This morning she was scheduled for a follow-up cardiology visit and began having chest pain. She reported the chest pain was left-sided and crushing in nature and radiated to the right. She reports the chest pain did not last very long. She denied diaphoresis, nausea, or vomiting with the chest pain. Patient also denied shortness of breath. Patient reports her chest pain has now subsided and she just feels extremely tired. Patient was evaluated in the ED with negative troponin and unchanged EKG. Patient placed in observation for chest pain. Cardiology consulted.     Past Medical History:        Diagnosis Date    Arthritis     Fernandes's esophagus     DDD (degenerative disc disease), lumbar     DM (diabetes mellitus), type 2 ([address]     Duodenal ulcer     E. coli sepsis ([facility] [address] 02/2014    Elevated lipids     Elevated pancreatic enzyme     Gastritis     GERD (gastroesophageal reflux disease)     Hiatal hernia     History of colon polyps     HTN (hypertension)     Hypercholesterolemia     Hypothyroidism     Nausea and vomiting     Osteoarthritis     other     Non Specified Ulcers    Pernicious anemia     Skin cancer     History OF.  Venous angioma     left-parietal       Past Surgical History:        Procedure Laterality Date    BREAST SURGERY      BG Biopsy    CHOLECYSTECTOMY      COLONOSCOPY  3/16/2005    DR. OTERO    COLONOSCOPY  5/21/2010    tubular adenoma    COLONOSCOPY  12/31/2015    Dr Kvng Regalado, normal, 5 yr recall    FRACTURE SURGERY      x 2 right ankle    HAND SURGERY Right 07/2012    Dr Beryle Davidson removed due to arthritis and tendon repair    HYSTERECTOMY, TOTAL ABDOMINAL  1969    KNEE SURGERY Right     LUMBAR [address] SURGERY  11/2008    L5-S1-Dr Israel Conway LUMBAR SPINE SURGERY  2005    OTHER SURGICAL HISTORY Right     Basa Cell Lesion Removal-elbow    OTHER SURGICAL HISTORY Right 2002    ORIF ankle    MD EGD INTRMURAL NEEDLE ASPIR/BIOP ALTERED ANATOMY N/A 8/15/2018    Dr Avila [facility] EGD TRANSORAL BIOPSY SINGLE/MULTIPLE N/A 12/6/2017    EGD BIOPSY performed by Rob Freitas DO [address] Right     ROTATOR CUFF REPAIR Left 12/2013    Dr Kj Anderson  5/19/2005    DR. KIM    UPPER GASTROINTESTINAL ENDOSCOPY  10/22/2009    peptic stricture, schatzki ring dilated 46 fr, sm hiatal hernia, gastritis    UPPER GASTROINTESTINAL ENDOSCOPY  10/01/2014    Dr Hauser-empiric dilation, urease neg    UPPER GASTROINTESTINAL ENDOSCOPY N/A 12/18/2015    Dr Kvng Regalado, stricture/ulcers, repeat 8 wks    UPPER GASTROINTESTINAL ENDOSCOPY  12/6/2017    Dr Hauser-w/dilation over wire, 47 [place] citizen-hiatal hernia, distal esophageal stricture    UPPER GASTROINTESTINAL ENDOSCOPY  8/15/2018    Dr SANYA Finley-w/dilation over wire-[address] and EUS-Possible duodenitis, gastritis, duodenal stricture likely peptic stricture nature, hiatal hernia    UPPER GASTROINTESTINAL ENDOSCOPY  8/15/2018    Dr Myah Finley-w/dilation over wire-[address] and EUS-Possible duodenitis, gastritis, duodenal stricture likely peptic stricture nature, hiatal hernia       Home Medications:  Prior to Admission medications    Medication Sig Start Date End Date Taking? Authorizing Provider   ezetimibe (ZETIA) 10 MG tablet Take 10 mg by mouth daily    Historical Provider, MD   vitamin D (ERGOCALCIFEROL) 1.25 MG (38991 UT) CAPS capsule Take 50,000 Units by mouth once a week    Historical Provider, MD   pantoprazole (PROTONIX) 40 MG tablet Take 40 mg by mouth daily    Historical Provider, MD   aspirin EC 81 MG EC tablet Take 1 tablet by mouth daily 7/27/21   Erin Quintero MD   clopidogrel (PLAVIX) 75 MG tablet Take 1 tablet by mouth daily 7/27/21   Erin Quintero MD   carvedilol (COREG) 3.125 MG tablet Take 1 tablet by mouth 2 times daily (with meals) 7/27/21   Erin Quintero MD   insulin glargine (LANTUS) 100 UNIT/ML injection vial Inject 16 Units into the skin nightly    Historical Provider, MD   levothyroxine (SYNTHROID) 75 MCG tablet Take 75 mcg by mouth Daily. Historical Provider, MD   MetFORMIN HCl (GLUCOPHAGE PO) Take 1,000 mg by mouth daily     Historical Provider, MD   LISINOPRIL PO Take 10 mg by mouth daily. Historical Provider, MD       Allergies:    Perflutren lipid microsphere, Januvia [sitagliptin], and Vancomycin    Social History:    The patient currently lives home alone  Tobacco:   reports that she quit smoking about 32 years ago. Her smoking use included cigarettes. She has a 5.00 pack-year smoking history. She has never used smokeless tobacco.  Alcohol:   reports current alcohol use.   Illicit Drugs: denies    Family History:      Problem Relation Age of Onset    Breast Cancer Mother     Colon Cancer Neg Hx     Colon Polyps Neg Hx     Esophageal Cancer Neg Hx     Liver Disease Neg Hx     Liver Cancer Neg Hx     Rectal Cancer Neg Hx     Stomach Cancer Neg Hx        Review of Systems:   Review of Systems Constitutional: Positive for activity change and fatigue. Negative for diaphoresis. HENT: Negative for sore throat and trouble swallowing. Respiratory: Positive for chest tightness. Negative for shortness of breath and wheezing. Cardiovascular: Positive for chest pain. Negative for palpitations and leg swelling. Gastrointestinal: Negative for abdominal pain, nausea and vomiting. Genitourinary: Negative for dysuria, frequency and urgency. Musculoskeletal: Negative for arthralgias and myalgias. Skin: Negative for color change and wound. Neurological: Negative for dizziness and headaches. Psychiatric/Behavioral: Negative for agitation, behavioral problems and confusion. 14 point review of systems is negative except as specifically addressed above. Physical Examination:  BP (!) 140/78   Pulse 67   Temp 97.3 °F (36.3 °C)   Resp 18   Ht 5' 4\" (1.626 m)   Wt 163 lb 1.6 oz (74 kg)   SpO2 100%   BMI 28.00 kg/m²   Physical Exam  Vitals reviewed. Constitutional:       Appearance: She is ill-appearing. Comments: Appears much younger than stated age   HENT:      Head: Normocephalic. Mouth/Throat:      Mouth: Mucous membranes are moist.      Pharynx: Oropharynx is clear. Eyes:      Pupils: Pupils are equal, round, and reactive to light. Cardiovascular:      Rate and Rhythm: Normal rate and regular rhythm. Pulses: Normal pulses. Heart sounds: Normal heart sounds. Pulmonary:      Effort: Pulmonary effort is normal.      Breath sounds: Normal breath sounds. Abdominal:      General: Abdomen is flat. Bowel sounds are normal. There is no distension. Palpations: Abdomen is soft. Tenderness: There is no abdominal tenderness. There is no guarding. Musculoskeletal:         General: Normal range of motion. Cervical back: Normal range of motion and neck supple. Skin:     General: Skin is warm and dry.       Capillary Refill: Capillary refill takes less than 2 seconds. Neurological:      General: No focal deficit present. Mental Status: She is alert and oriented to person, place, and time. Diagnostic Data:  CBC:  Recent Labs     05/11/22 0935   WBC 6.3   HGB 12.0   HCT 38.0        BMP:  Recent Labs     05/11/22 0935      K 3.9      CO2 24   BUN 18   CREATININE 1.4*   CALCIUM 9.7     Recent Labs     05/11/22 0935   AST 13   ALT 21   BILITOT 0.7   ALKPHOS 110*     Coag Panel: No results for input(s): INR, PROTIME, APTT in the last 72 hours. Cardiac Enzymes:   Recent Labs     05/11/22 0935   TROPONINI <0.01     ABGs:No results found for: PHART, PO2ART, ANE2IHJ  Urinalysis:  Lab Results   Component Value Date    NITRU Negative 05/11/2022    WBCUA 5 05/11/2022    BACTERIA NEGATIVE 05/11/2022    RBCUA 1 05/11/2022    BLOODU Negative 05/11/2022    SPECGRAV 1.013 05/11/2022    GLUCOSEU Negative 05/11/2022     A1C: No results for input(s): LABA1C in the last 72 hours. ABG:No results for input(s): PHART, GSY3FOW, PO2ART, ARB0QDA, BEART, HGBAE, N5LBSHCO, CARBOXHGBART in the last 72 hours. XR CHEST PORTABLE    Result Date: 5/11/2022  EXAM: XR CHEST PORTABLE INDICATION: Chest pain COMPARISON: None available. FINDINGS: The cardiac silhouette is normal in size. No pleural effusion, pneumothorax, or focal consolidation. Calcified granulomas in the RIGHT lower lobe and RIGHT hilum are noted. There is a small nodular density in the RIGHT upper lateral lung zone measuring 10 mm. No acute osseous finding. 1.  No acute findings. 2.  10 mm nodular density in the RIGHT upper lateral lung. This could represent artifact but a pulmonary nodule is also possible. Recommend a follow-up chest radiograph in one month to evaluate for persistence. If this finding persists, would then recommend a chest CT.  Signed by Dr Silvestre Gruber      Assessment/Plan:  Principal Problem:    Chest pain /  History of MI (myocardial infarction) / Hypercholesterolemia   -observation   -cardiology consult   -Nitro PRN   -trend trop   -tele   -? NM stress if ok with cardiology   -continue coreg, plavix, and lisinopril    Active Problems:    DDD (degenerative disc disease), lumbar   -noted      DM (diabetes mellitus), type 2 (HCC)    -accus checks   -SSI   -hypoglycemia protocol in place      Chronic GERD /  History of esophageal stricture    -continue protonix    Resolved Problems:    * No resolved hospital problems. *       Signed:  MARTY Kirk - CNP, 5/11/2022 2:15 PM       Attestation Statement     I have independently seen and examined this patient and agree with the asesment and plan by mid level provider                                                      Objective:   Vitals: BP (!) 140/78   Pulse 67   Temp 97.3 °F (36.3 °C)   Resp 18   Ht 5' 4\" (1.626 m)   Wt 163 lb 1.6 oz (74 kg)   SpO2 100%   BMI 28.00 kg/m²   General appearance: alert, appears stated age and cooperative  Skin: Skin color, texture, turgor normal.   HEENT: Head: Normocephalic, no lesions, without obvious abnormality. Neck: no adenopathy, no carotid bruit, no JVD and supple, symmetrical, trachea midline  Lungs: clear to auscultation bilaterally  Heart: regular rate and rhythm, S1, S2 normal, no murmur, click, rub or gallop  Abdomen: soft, non-tender; bowel sounds normal; no masses,  no organomegaly  Extremities: extremities normal, atraumatic, no cyanosis or edema  Lymphatic: No significant lymph node enlargement papable  Neurologic: Mental status: Alert, oriented, thought content appropriate      Assessment & Plan:    Chest pain- cardiology consulted- stress test am  Possible UTI- on ab- follow cultures    HTN  DM2  10 mm nodular density in the RIGHT upper lateral lung- Recommend   a follow-up chest radiograph in one month to evaluate for persistence.    If this finding persists, would then recommend a chest CT    Observation HP    Camille Arreguin MD

## 2022-05-11 NOTE — CONSULTS
[facility] ([facility]) Cardiology   Consult Note       Requesting MD:  Khoa Pugh, *   Admit Status:         Patient:    Saman Kidd  [id number]  1940         Chief Complaint: Atypical chest pain on and off for 4 hours  HPI: Patient is a [de-identified] y.o. female known to me since July of last year. At that time she was admitted for acute inferior wall myocardial infarct treated with stenting of the mid right coronary artery. She did well after that until 6 months prior to admission. She has been getting easy fatigue and lower abdominal pain during urination. She has been treated for UTI recently with no improvement. She denies any history of recurrence of chest pain similar to the myocardial infarct pain in July 2021. Patient had stress echo done February 2022 and showed no obvious wall motion abnormality. She cannot tolerate dobutamine. Echocardiogram showed normal ejection fraction. This morning at rest patient developed on and off left-sided tingling and pain sensation lasting for few seconds with no radiation. There was no shortness of breath or diaphoreses. She is known to have hypertension, diabetes and hyperlipidemia. She is a non-smoker and drinks occasionally. She does not use recreational drug    Review of Systems:  HENNT: normal  PULMONARY: normal   CVS:see history of present illness  ABD: History of present illness  PERIPHERL: normal  MSK: Chronic back pain and leg pain.   Right ankle surgery in the past  CNS: Denies any dizziness, syncopal episode or history of stroke  Renal: Before UTI recently    Cardiac Specific Data:  Specialty Problems        Cardiology Problems    Acute inferior myocardial infarction [facility])        Hypercholesterolemia        * (Principal) Chest pain              Past Medical History:  Past Medical History:   Diagnosis Date    Arthritis     Fernandes's esophagus     DDD (degenerative disc disease), lumbar     DM (diabetes mellitus), type 2 ([facility] [address]     Duodenal ulcer     E. coli sepsis ([facility] [address] 02/2014    Elevated lipids     Elevated pancreatic enzyme     Gastritis     GERD (gastroesophageal reflux disease)     Hiatal hernia     History of colon polyps     HTN (hypertension)     Hypercholesterolemia     Hypothyroidism     Nausea and vomiting     Osteoarthritis     other     Non Specified Ulcers    Pernicious anemia     Skin cancer     History OF.  Venous angioma     left-parietal        Past Surgical History:  Past Surgical History:   Procedure Laterality Date    BREAST SURGERY      BG Biopsy    CHOLECYSTECTOMY      COLONOSCOPY  3/16/2005    DR. OTERO    COLONOSCOPY  5/21/2010    tubular adenoma    COLONOSCOPY  12/31/2015    Dr Quin Ahn, normal, 5 yr recall    FRACTURE SURGERY      x 2 right ankle    HAND SURGERY Right 07/2012    Dr Viviana Alfaro removed due to arthritis and tendon repair    HYSTERECTOMY, TOTAL ABDOMINAL  1969    KNEE SURGERY Right     LUMBAR [address] SURGERY  11/2008    L5-S1-Dr Sophie Cohen LUMBAR SPINE SURGERY  2005    OTHER SURGICAL HISTORY Right     Basa Cell Lesion Removal-elbow    OTHER SURGICAL HISTORY Right 2002    ORIF ankle    NM EGD INTRMURAL NEEDLE ASPIR/BIOP ALTERED ANATOMY N/A 8/15/2018    Dr Georgean Kocher EGD TRANSORAL BIOPSY SINGLE/MULTIPLE N/A 12/6/2017    [facility]     ROTATOR CUFF REPAIR Left 12/2013     [facility]  5/19/2005    DR. KIM    UPPER GASTROINTESTINAL ENDOSCOPY  10/22/2009    peptic stricture, schatzki ring dilated 46 fr, sm hiatal hernia, gastritis    UPPER GASTROINTESTINAL ENDOSCOPY  10/01/2014    Dr Hauser-empiric dilation, urease neg    UPPER GASTROINTESTINAL ENDOSCOPY N/A 12/18/2015    Dr Quin Ahn, stricture/ulcers, repeat 8 wks    UPPER GASTROINTESTINAL ENDOSCOPY  12/6/2017    Dr Hauser-w/dilation over wire, 47 [place]-hiatal hernia, distal esophageal stricture    UPPER Stress:     Feeling of Stress : Not on file   Social Connections:     Frequency of Communication with Friends and Family: Not on file    Frequency of Social Gatherings with Friends and Family: Not on file    Attends Episcopal Services: Not on file    Active Member of Clubs or Organizations: Not on file    Attends Club or Organization Meetings: Not on file    Marital Status: Not on file   Intimate Partner Violence:     Fear of Current or Ex-Partner: Not on file    Emotionally Abused: Not on file    Physically Abused: Not on file    Sexually Abused: Not on file   Housing Stability:     Unable to Pay for Housing in the Last Year: Not on file    Number of [place] in the Last Year: Not on file    Unstable Housing in the Last Year: Not on file       Allergies: Allergies   Allergen Reactions    Perflutren Lipid Microsphere Other (See Comments)     Severe back pain    Januvia [Sitagliptin] Nausea And Vomiting    Vancomycin Itching     Red itchy scalp       Prior to Admission medications    Medication Sig Start Date End Date Taking? Authorizing Provider   ezetimibe (ZETIA) 10 MG tablet Take 10 mg by mouth daily    Historical Provider, MD   vitamin D (ERGOCALCIFEROL) 1.25 MG (01323 UT) CAPS capsule Take 50,000 Units by mouth once a week    Historical Provider, MD   pantoprazole (PROTONIX) 40 MG tablet Take 40 mg by mouth daily    Historical Provider, MD   aspirin EC 81 MG EC tablet Take 1 tablet by mouth daily 7/27/21   Lara Frances MD   clopidogrel (PLAVIX) 75 MG tablet Take 1 tablet by mouth daily 7/27/21   Lara Frances MD   carvedilol (COREG) 3.125 MG tablet Take 1 tablet by mouth 2 times daily (with meals) 7/27/21   Lara Frances MD   insulin glargine (LANTUS) 100 UNIT/ML injection vial Inject 16 Units into the skin nightly    Historical Provider, MD   levothyroxine (SYNTHROID) 75 MCG tablet Take 75 mcg by mouth Daily.     Historical Provider, MD   MetFORMIN HCl (GLUCOPHAGE PO) Take 1,000 mg by mouth daily     Historical Provider, MD   LISINOPRIL PO Take 10 mg by mouth daily.     Historical Provider, MD        Current Facility-Administered Medications   Medication Dose Route Frequency Provider Last Rate Last Admin    nitroGLYCERIN (NITROSTAT) SL tablet 0.4 mg  0.4 mg SubLINGual Q5 Min PRN JAYLIN Espinosa   0.4 mg at 05/11/22 1017    sodium chloride flush 0.9 % injection 5-40 mL  5-40 mL IntraVENous 2 times per day Rubens León MD        sodium chloride flush 0.9 % injection 10 mL  10 mL IntraVENous PRN Rubens León MD        0.9 % sodium chloride infusion   IntraVENous PRN Rubens León MD        ondansetron (ZOFRAN-ODT) disintegrating tablet 4 mg  4 mg Oral Q8H PRN Rubens León MD        Or    ondansetron [facility]) injection 4 mg  4 mg IntraVENous Q6H PRN Rubens León MD        acetaminophen (TYLENOL) tablet 650 mg  650 mg Oral Q6H PRN Rubens León MD        Or    acetaminophen (TYLENOL) suppository 650 mg  650 mg Rectal Q6H PRN Rubens León MD        [START ON 5/12/2022] aspirin chewable tablet 81 mg  81 mg Oral Daily Rubens León MD       [facility] ON 5/12/2022] enoxaparin (LOVENOX) injection 40 mg  40 mg SubCUTAneous Daily Rubens León MD        atorvastatin (LIPITOR) tablet 40 mg  40 mg Oral Nightly Rubens León MD        bisacodyl (DULCOLAX) EC tablet 5 mg  5 mg Oral Daily PRN Rubens León MD        cefTRIAXone (ROCEPHIN) 1,000 mg in sterile water 10 mL IV syringe  1,000 mg IntraVENous Q24H Rubens León MD        carvedilol (COREG) tablet 3.125 mg  3.125 mg Oral BID WC Rubens León MD        clopidogrel (PLAVIX) tablet 75 mg  75 mg Oral Daily Rubens León MD        ezetimibe (ZETIA) tablet 10 mg  10 mg Oral Daily Rubens León MD        levothyroxine (SYNTHROID) tablet 75 mcg  75 mcg Oral Daily Solitario Jaramillo MD Audra        lisinopril (PRINIVIL;ZESTRIL) tablet 10 mg  10 mg Oral Daily Anthony Hensley MD        pantoprazole (PROTONIX) tablet 40 mg  40 mg Oral Daily Anthony Hensley MD        vitamin D (ERGOCALCIFEROL) capsule 50,000 Units  50,000 Units Oral Weekly Anthony Hensley MD            Current Infused Meds:   sodium chloride         Physical Exam:  Vitals:    05/11/22 1100   BP: (!) 140/78   Pulse: 67   Resp: 18   Temp: 97.3 °F (36.3 °C)   SpO2: 100%     No intake or output data in the 24 hours ending 05/11/22 1225  Estimated body mass index is 28 kg/m² as calculated from the following:    Height as of this encounter: 5' 4\" (1.626 m). Weight as of this encounter: 163 lb 1.6 oz (74 kg). PHYSICAL EXAM:  HENNT: normal  PULMONARY: normal to inspection, palpation, percussion and ascultation  CVS:Normal neck vein, S1 and S2 normal, no murmur  ABD: Mild degree of tenderness on palpation of the lower abdomen with no guarding l  PERIPHERL: all pulses are normal with no bruit  MSK: no swelling of the lower extremities  CNS: Normal CN 2,3,4,6,5,7,9,10,11,and 12. Oriented to time, place and person    Labs:  Recent Labs     05/11/22  0935   WBC 6.3   HGB 12.0          Recent Labs     05/11/22  0935      K 3.9      CO2 24   BUN 18   CREATININE 1.4*   LABGLOM 36*   MG 1.8   CALCIUM 9.7       CK, CKMB, Troponin:   Recent Labs     05/11/22  0935   TROPONINI <0.01       Last 3 BNP:  Recent Labs     05/11/22  0935   PROBNP 188             CT ABDOMEN PELVIS W IV CONTRAST Additional Contrast? None    Result Date: 5/3/2022  1. Findings suggestive of urinary tract infection, with mild urothelial thickening at the left renal pelvis, mild bladder wall thickening. Clinical correlation is recommended. No evidence of hydronephrosis is identified. 2. Previous cholecystectomy with a mild reservoir effect of the extrahepatic bile ducts. 3. Evidence of previous hysterectomy.  4. Nonvisualization of the appendix. 5. Mild sigmoid diverticulosis without evidence of acute diverticulitis. 6. Small sliding-type hiatal hernia. Signed by Dr Cathy Combs. [place]    XR CHEST PORTABLE    Result Date: 5/11/2022  1. No acute findings. 2.  10 mm nodular density in the RIGHT upper lateral lung. This could represent artifact but a pulmonary nodule is also possible. Recommend a follow-up chest radiograph in one month to evaluate for persistence. If this finding persists, would then recommend a chest CT. Signed by Dr Gladys Rosario and Plan:  1. Atypical chest pain on and off for 4 hours, troponin is normal, EKG no acute changes  2. Fatigue for 6 months, etiology unclear  3. Hypertension, diabetes and hyperlipidemia  4. Urinary tract infection with lower abdominal pain    Plan: We will proceed with nuclear stress test.    I have spent 60 minutes reviewing the chart, taking history, examining the patient, discussion with the patient and the family concerning the finding, diagnoses and treatment plan. This dictation was performed using [address] [place]. Mistake and misspelling may have been created without  realizing them. Please notify me for clarification.   Thank you    Mona Villafuerte MD   5/11/2022, 12:25 PM

## 2022-05-11 NOTE — TELEPHONE ENCOUNTER
Patient presented to office with chest pain and sob. Patient scheduled to see Dr. Meenakshi Eaton today at 0930. Patient rates her pain as 4/10 with constant chest pain. Patient visibly sob. Spoke with Dr. Meenakshi Eaton and he advised that patient go to ED. I told patient per Dr. Meenakshi Eaton that she will need to go to ED. I asked her if she was able to get to ED or needed assistance, she stated her  was outside and could take her.

## 2022-05-12 ENCOUNTER — APPOINTMENT (OUTPATIENT)
Dept: NUCLEAR MEDICINE | Age: 82
End: 2022-05-12
Payer: MEDICARE

## 2022-05-12 LAB
ALBUMIN SERPL-MCNC: 3.9 G/DL (ref 3.5–5.2)
ALP BLD-CCNC: 97 U/L (ref 35–104)
ALT SERPL-CCNC: 17 U/L (ref 5–33)
ANION GAP SERPL CALCULATED.3IONS-SCNC: 12 MMOL/L (ref 7–19)
ANION GAP SERPL CALCULATED.3IONS-SCNC: 13 MMOL/L (ref 7–19)
AST SERPL-CCNC: 13 U/L (ref 5–32)
BILIRUB SERPL-MCNC: 0.5 MG/DL (ref 0.2–1.2)
BUN BLDV-MCNC: 16 MG/DL (ref 8–23)
BUN BLDV-MCNC: 20 MG/DL (ref 8–23)
CALCIUM SERPL-MCNC: 9.4 MG/DL (ref 8.8–10.2)
CALCIUM SERPL-MCNC: 9.6 MG/DL (ref 8.8–10.2)
CHLORIDE BLD-SCNC: 101 MMOL/L (ref 98–111)
CHLORIDE BLD-SCNC: 107 MMOL/L (ref 98–111)
CHOLESTEROL, TOTAL: 171 MG/DL (ref 160–199)
CO2: 22 MMOL/L (ref 22–29)
CO2: 23 MMOL/L (ref 22–29)
CREAT SERPL-MCNC: 1.2 MG/DL (ref 0.5–0.9)
CREAT SERPL-MCNC: 1.4 MG/DL (ref 0.5–0.9)
EKG P AXIS: -21 DEGREES
EKG P AXIS: 41 DEGREES
EKG P AXIS: 43 DEGREES
EKG P AXIS: 80 DEGREES
EKG P-R INTERVAL: 204 MS
EKG P-R INTERVAL: 216 MS
EKG P-R INTERVAL: 232 MS
EKG P-R INTERVAL: 236 MS
EKG Q-T INTERVAL: 380 MS
EKG Q-T INTERVAL: 412 MS
EKG Q-T INTERVAL: 414 MS
EKG Q-T INTERVAL: 418 MS
EKG QRS DURATION: 80 MS
EKG QRS DURATION: 82 MS
EKG QRS DURATION: 86 MS
EKG QRS DURATION: 88 MS
EKG QTC CALCULATION (BAZETT): 394 MS
EKG QTC CALCULATION (BAZETT): 417 MS
EKG QTC CALCULATION (BAZETT): 420 MS
EKG QTC CALCULATION (BAZETT): 421 MS
EKG T AXIS: -9 DEGREES
EKG T AXIS: 0 DEGREES
EKG T AXIS: 21 DEGREES
EKG T AXIS: 5 DEGREES
FERRITIN: 48.5 NG/ML (ref 13–150)
GFR AFRICAN AMERICAN: 44
GFR AFRICAN AMERICAN: 52
GFR NON-AFRICAN AMERICAN: 36
GFR NON-AFRICAN AMERICAN: 43
GLUCOSE BLD-MCNC: 130 MG/DL (ref 74–109)
GLUCOSE BLD-MCNC: 132 MG/DL (ref 70–99)
GLUCOSE BLD-MCNC: 132 MG/DL (ref 70–99)
GLUCOSE BLD-MCNC: 202 MG/DL (ref 70–99)
GLUCOSE BLD-MCNC: 212 MG/DL (ref 74–109)
HCT VFR BLD CALC: 34.8 % (ref 37–47)
HCT VFR BLD CALC: 40.6 % (ref 37–47)
HDLC SERPL-MCNC: 45 MG/DL (ref 65–121)
HEMOGLOBIN: 10.9 G/DL (ref 12–16)
HEMOGLOBIN: 11.8 G/DL (ref 12–16)
IRON SATURATION: 31 % (ref 14–50)
IRON: 87 UG/DL (ref 37–145)
LDL CHOLESTEROL CALCULATED: 97 MG/DL
LV EF: 52 %
LVEF MODALITY: NORMAL
MCH RBC QN AUTO: 27.1 PG (ref 27–31)
MCH RBC QN AUTO: 28 PG (ref 27–31)
MCHC RBC AUTO-ENTMCNC: 29.1 G/DL (ref 33–37)
MCHC RBC AUTO-ENTMCNC: 31.3 G/DL (ref 33–37)
MCV RBC AUTO: 86.6 FL (ref 81–99)
MCV RBC AUTO: 96.2 FL (ref 81–99)
PDW BLD-RTO: 11.4 % (ref 11.5–14.5)
PDW BLD-RTO: 11.4 % (ref 11.5–14.5)
PERFORMED ON: ABNORMAL
PLATELET # BLD: 274 K/UL (ref 130–400)
PLATELET # BLD: 294 K/UL (ref 130–400)
PMV BLD AUTO: 10.2 FL (ref 9.4–12.3)
PMV BLD AUTO: 9.8 FL (ref 9.4–12.3)
POTASSIUM REFLEX MAGNESIUM: 4 MMOL/L (ref 3.5–5)
POTASSIUM SERPL-SCNC: 4.1 MMOL/L (ref 3.5–5)
RBC # BLD: 4.02 M/UL (ref 4.2–5.4)
RBC # BLD: 4.22 M/UL (ref 4.2–5.4)
REASON FOR REJECTION: NORMAL
REJECTED TEST: NORMAL
SODIUM BLD-SCNC: 136 MMOL/L (ref 136–145)
SODIUM BLD-SCNC: 142 MMOL/L (ref 136–145)
TOTAL IRON BINDING CAPACITY: 281 UG/DL (ref 250–400)
TOTAL PROTEIN: 6.1 G/DL (ref 6.6–8.7)
TRIGL SERPL-MCNC: 145 MG/DL (ref 0–149)
TROPONIN: <0.01 NG/ML (ref 0–0.03)
WBC # BLD: 6.1 K/UL (ref 4.8–10.8)
WBC # BLD: 7.2 K/UL (ref 4.8–10.8)

## 2022-05-12 PROCEDURE — 78452 HT MUSCLE IMAGE SPECT MULT: CPT | Performed by: INTERNAL MEDICINE

## 2022-05-12 PROCEDURE — 83550 IRON BINDING TEST: CPT

## 2022-05-12 PROCEDURE — 83540 ASSAY OF IRON: CPT

## 2022-05-12 PROCEDURE — 82728 ASSAY OF FERRITIN: CPT

## 2022-05-12 PROCEDURE — G0378 HOSPITAL OBSERVATION PER HR: HCPCS

## 2022-05-12 PROCEDURE — 6360000002 HC RX W HCPCS: Performed by: HOSPITALIST

## 2022-05-12 PROCEDURE — 6360000002 HC RX W HCPCS: Performed by: INTERNAL MEDICINE

## 2022-05-12 PROCEDURE — 80061 LIPID PANEL: CPT

## 2022-05-12 PROCEDURE — A9502 TC99M TETROFOSMIN: HCPCS | Performed by: INTERNAL MEDICINE

## 2022-05-12 PROCEDURE — 6370000000 HC RX 637 (ALT 250 FOR IP): Performed by: HOSPITALIST

## 2022-05-12 PROCEDURE — 84484 ASSAY OF TROPONIN QUANT: CPT

## 2022-05-12 PROCEDURE — 80053 COMPREHEN METABOLIC PANEL: CPT

## 2022-05-12 PROCEDURE — 99214 OFFICE O/P EST MOD 30 MIN: CPT | Performed by: INTERNAL MEDICINE

## 2022-05-12 PROCEDURE — 82947 ASSAY GLUCOSE BLOOD QUANT: CPT

## 2022-05-12 PROCEDURE — 2580000003 HC RX 258: Performed by: HOSPITALIST

## 2022-05-12 PROCEDURE — 36415 COLL VENOUS BLD VENIPUNCTURE: CPT

## 2022-05-12 PROCEDURE — 93005 ELECTROCARDIOGRAM TRACING: CPT | Performed by: HOSPITALIST

## 2022-05-12 PROCEDURE — 93017 CV STRESS TEST TRACING ONLY: CPT

## 2022-05-12 PROCEDURE — 2580000003 HC RX 258: Performed by: INTERNAL MEDICINE

## 2022-05-12 PROCEDURE — 85027 COMPLETE CBC AUTOMATED: CPT

## 2022-05-12 PROCEDURE — 3430000000 HC RX DIAGNOSTIC RADIOPHARMACEUTICAL: Performed by: INTERNAL MEDICINE

## 2022-05-12 RX ORDER — SODIUM CHLORIDE 9 MG/ML
INJECTION, SOLUTION INTRAVENOUS PRN
Status: DISCONTINUED | OUTPATIENT
Start: 2022-05-12 | End: 2022-05-13

## 2022-05-12 RX ORDER — SODIUM CHLORIDE 0.9 % (FLUSH) 0.9 %
5-40 SYRINGE (ML) INJECTION EVERY 12 HOURS SCHEDULED
Status: DISCONTINUED | OUTPATIENT
Start: 2022-05-12 | End: 2022-05-13

## 2022-05-12 RX ORDER — SODIUM CHLORIDE 9 MG/ML
INJECTION, SOLUTION INTRAVENOUS CONTINUOUS
Status: DISCONTINUED | OUTPATIENT
Start: 2022-05-12 | End: 2022-05-13 | Stop reason: HOSPADM

## 2022-05-12 RX ORDER — SODIUM CHLORIDE 0.9 % (FLUSH) 0.9 %
5-40 SYRINGE (ML) INJECTION PRN
Status: DISCONTINUED | OUTPATIENT
Start: 2022-05-12 | End: 2022-05-13

## 2022-05-12 RX ADMIN — ASPIRIN 81 MG 81 MG: 81 TABLET ORAL at 08:42

## 2022-05-12 RX ADMIN — PANTOPRAZOLE SODIUM 40 MG: 40 TABLET, DELAYED RELEASE ORAL at 08:42

## 2022-05-12 RX ADMIN — TETROFOSMIN 24 MILLICURIE: 1.38 INJECTION, POWDER, LYOPHILIZED, FOR SOLUTION INTRAVENOUS at 12:12

## 2022-05-12 RX ADMIN — SODIUM CHLORIDE: 9 INJECTION, SOLUTION INTRAVENOUS at 14:14

## 2022-05-12 RX ADMIN — TETROFOSMIN 8 MILLICURIE: 1.38 INJECTION, POWDER, LYOPHILIZED, FOR SOLUTION INTRAVENOUS at 12:12

## 2022-05-12 RX ADMIN — CARVEDILOL 3.12 MG: 3.12 TABLET, FILM COATED ORAL at 12:09

## 2022-05-12 RX ADMIN — REGADENOSON 0.4 MG: 0.08 INJECTION, SOLUTION INTRAVENOUS at 11:39

## 2022-05-12 RX ADMIN — SODIUM CHLORIDE, PRESERVATIVE FREE 10 ML: 5 INJECTION INTRAVENOUS at 08:46

## 2022-05-12 RX ADMIN — EZETIMIBE 10 MG: 10 TABLET ORAL at 08:41

## 2022-05-12 RX ADMIN — LISINOPRIL 10 MG: 10 TABLET ORAL at 08:42

## 2022-05-12 RX ADMIN — CLOPIDOGREL BISULFATE 75 MG: 75 TABLET ORAL at 08:42

## 2022-05-12 RX ADMIN — CARVEDILOL 3.12 MG: 3.12 TABLET, FILM COATED ORAL at 16:58

## 2022-05-12 RX ADMIN — ENOXAPARIN SODIUM 40 MG: 100 INJECTION SUBCUTANEOUS at 08:42

## 2022-05-12 RX ADMIN — LEVOTHYROXINE SODIUM 75 MCG: 75 TABLET ORAL at 06:02

## 2022-05-12 RX ADMIN — ATORVASTATIN CALCIUM 40 MG: 40 TABLET, FILM COATED ORAL at 22:21

## 2022-05-12 ASSESSMENT — ENCOUNTER SYMPTOMS
DIARRHEA: 0
VOMITING: 0
ABDOMINAL PAIN: 1
NAUSEA: 0
ABDOMINAL DISTENTION: 0
SHORTNESS OF BREATH: 0

## 2022-05-12 ASSESSMENT — PAIN DESCRIPTION - LOCATION: LOCATION: ABDOMEN

## 2022-05-12 ASSESSMENT — PAIN SCALES - GENERAL: PAINLEVEL_OUTOF10: 3

## 2022-05-12 NOTE — PLAN OF CARE
Problem: Discharge Planning  Goal: Discharge to home or other facility with appropriate resources  Outcome: Progressing     Problem: Safety - Adult  Goal: Free from fall injury  Outcome: Progressing     Problem: Safety - Adult  Goal: Free from fall injury  Outcome: Progressing     Problem: ABCDS Injury Assessment  Goal: Absence of physical injury  Outcome: Progressing

## 2022-05-12 NOTE — PROGRESS NOTES
[facility] MEDICINE  - PROGRESS NOTE    Admit Date: 5/11/2022         CC: chest pain     Subjective: no chest pain, no dyspnea, no palpitations, no cough, no wheezing, no fever or chills, no N/V/D/C, no abd pain     Objective: mild if any distress    Diet: ADULT DIET; Regular; 4 carb choices (60 gm/meal);  Low Sodium (2 gm)  Diet NPO Exceptions are: Sips of Water with Meds  Pain is:None  Nausea:None  Bowel Movement/Flatus yes    Data:   Scheduled Meds: Reviewed  Current Facility-Administered Medications   Medication Dose Route Frequency Provider Last Rate Last Admin    0.9 % sodium chloride infusion   IntraVENous Continuous Magdaleno Braxton MD 75 mL/hr at 05/12/22 1414 New Bag at 05/12/22 1414    nitroGLYCERIN (NITROSTAT) SL tablet 0.4 mg  0.4 mg SubLINGual Q5 Min PRN JAYLIN Cardona   0.4 mg at 05/11/22 1017    sodium chloride flush 0.9 % injection 5-40 mL  5-40 mL IntraVENous 2 times per day Omid Fernandez MD   10 mL at 05/12/22 0846    sodium chloride flush 0.9 % injection 10 mL  10 mL IntraVENous PRN Omid Fernandez MD        0.9 % sodium chloride infusion   IntraVENous PRN Omid Fernandez MD        ondansetron (ZOFRAN-ODT) disintegrating tablet 4 mg  4 mg Oral Q8H PRN Omid Fernandez MD        Or    ondansetron [facility]) injection 4 mg  4 mg IntraVENous Q6H PRN Omid Fernandez MD        acetaminophen (TYLENOL) tablet 650 mg  650 mg Oral Q6H PRN Omid Fernandez MD        Or    acetaminophen (TYLENOL) suppository 650 mg  650 mg Rectal Q6H PRN Omid Fernandez MD        aspirin chewable tablet 81 mg  81 mg Oral Daily Omid Fernandez MD   81 mg at 05/12/22 0842    enoxaparin (LOVENOX) injection 40 mg  40 mg SubCUTAneous Daily Omid Fernandez MD   40 mg at 05/12/22 0842    atorvastatin (LIPITOR) tablet 40 mg  40 mg Oral Nightly Omid Fernandez MD   40 mg at 05/11/22 4331    bisacodyl (DULCOLAX) EC tablet 5 mg  5 mg Oral Daily PRN Long Meredith MD        carvedilol (COREG) tablet 3.125 mg  3.125 mg Oral BID  Long Meredith MD   3.125 mg at 05/12/22 1658    clopidogrel (PLAVIX) tablet 75 mg  75 mg Oral Daily Long Meredith MD   75 mg at 05/12/22 7789    ezetimibe (ZETIA) tablet 10 mg  10 mg Oral Daily Long Meredith MD   10 mg at 05/12/22 0841    levothyroxine (SYNTHROID) tablet 75 mcg  75 mcg Oral Daily Long Meredith MD   75 mcg at 05/12/22 0602    lisinopril (PRINIVIL;ZESTRIL) tablet 10 mg  10 mg Oral Daily Long eMredith MD   10 mg at 05/12/22 0842    pantoprazole (PROTONIX) tablet 40 mg  40 mg Oral Daily Long Meredith MD   40 mg at 05/12/22 6575    vitamin D (ERGOCALCIFEROL) capsule 50,000 Units  50,000 Units Oral Weekly Long Meredith MD         DVT Prophylaxis: Lovenox 40 mg sq daily    Continuous Infusions:   sodium chloride 75 mL/hr at 05/12/22 1414    sodium chloride         Intake/Output Summary (Last 24 hours) at 5/12/2022 1745  Last data filed at 5/12/2022 1406  Gross per 24 hour   Intake 640 ml   Output 1300 ml   Net -660 ml     CBC:   Recent Labs     05/11/22  0935 05/12/22  0152   WBC 6.3 6.1   HGB 12.0 10.9*    274     BMP:  Recent Labs     05/11/22  0935 05/12/22  0152    142   K 3.9 4.0    107   CO2 24 22   BUN 18 16   CREATININE 1.4* 1.2*   GLUCOSE 122* 130*     ABGs: No results found for: PHART, PO2ART, VRR1INF  INR: No results for input(s): INR in the last 72 hours. Objective:   Vitals: BP (!) 156/69   Pulse 68   Temp 97.5 °F (36.4 °C) (Oral)   Resp 16   Ht 5' 4\" (1.626 m)   Wt 164 lb 4.8 oz (74.5 kg)   SpO2 99%   BMI 28.20 kg/m²   General appearance: alert, appears stated age and cooperative  Skin: Skin color, texture, turgor normal.   HEENT: Head: Normocephalic, no lesions, without obvious abnormality.   Neck: no adenopathy, no carotid bruit, no JVD and supple, symmetrical, trachea midline  Lungs: clear to auscultation bilaterally  Heart: regular rate and rhythm, S1, S2 normal, no murmur, click, rub or gallop  Abdomen: soft, non-tender; bowel sounds normal; no masses,  no organomegaly  Extremities: extremities normal, atraumatic, no cyanosis or edema  Lymphatic: No significant lymph node enlargement papable  Neurologic: Mental status: Alert, oriented, thought content appropriate        Assessment & Plan:    · Chest pain with abnormal nuclear stress test in the territory of the right coronary artery- cardiac cath am  · CAD with stenting of the right coronary artery July 2021  · Hypertension  · DM2  · HL  · 10 mm nodular density in the RIGHT upper lateral lung- Recommend   a follow-up chest radiograph in one month to evaluate for persistence.    If this finding persists, would then recommend a chest CT      Patient Active Problem List:     Esophageal dysphagia     Chronic GERD     History of esophageal stricture     History of adenomatous polyp of colon     History of gastric ulcer     S/P dilatation of esophageal stricture     Duodenal stricture     Duodenitis     DDD (degenerative disc disease), lumbar          See orders   Disposition: TBD    Michael Houser MD

## 2022-05-12 NOTE — PROGRESS NOTES
Cardiology Progress Note   Celso Koroma MD      Patient:    Bianca Wolff  [id number]  1940    Patient Active Problem List    Diagnosis Date Noted    History of MI (myocardial infarction) 07/25/2021     Priority: High    DDD (degenerative disc disease), lumbar      Priority: High    Hypercholesterolemia      Priority: High    DM (diabetes mellitus), type 2 ([address]      Priority: High    Chest pain 05/11/2022     Priority: Medium    Elevated lipase      Priority: Low    Duodenal stricture      Priority: Low    Duodenitis      Priority: Low    Esophageal stricture      Priority: Low    S/P dilatation of esophageal stricture 12/26/2017     Priority: Low    Non-intractable vomiting with nausea 11/20/2017     Priority: Low    Midepigastric pain 11/20/2017     Priority: Low    Generalized weakness 11/20/2017     Priority: Low    History of gastric ulcer 11/20/2017     Priority: Low    History of adenomatous polyp of colon 11/12/2015     Priority: Low    Esophageal dysphagia 09/16/2014     Priority: Low    Chronic GERD 09/16/2014     Priority: Low    History of esophageal stricture 09/16/2014     Priority: Low       Admit Date:  5/11/2022    Admission Problem List: Present on Admission:   Chest pain   DDD (degenerative disc disease), lumbar   Hypercholesterolemia   DM (diabetes mellitus), type 2 ([address]   Chronic GERD   History of esophageal stricture   History of MI (myocardial infarction)       Cardiac Specific Data:  Specialty Problems        Cardiology Problems    Hypercholesterolemia        * (Principal) Chest pain              Subjective:  Patient had nuclear stress test done today and showed moderate area, mild intensity of reversible inferior defect. July of last year patient had acute inferior wall myocardial infarct treated with stenting of the mid right coronary artery.     Objective:   /72   Pulse 73   Temp 97.9 °F (36.6 °C) (Temporal)   Resp 16   Ht 5' 4\" (1.626 m)   Wt 164 lb 4.8 oz (74.5 kg)   SpO2 99%   BMI 28.20 kg/m²       Intake/Output Summary (Last 24 hours) at 5/12/2022 1326  Last data filed at 5/12/2022 0604  Gross per 24 hour   Intake 520 ml   Output 1300 ml   Net -780 ml       Current Facility-Administered Medications   Medication Dose Route Frequency Provider Last Rate Last Admin    nitroGLYCERIN (NITROSTAT) SL tablet 0.4 mg  0.4 mg SubLINGual Q5 Min PRN JAYLIN Sanchez   0.4 mg at 05/11/22 1017    sodium chloride flush 0.9 % injection 5-40 mL  5-40 mL IntraVENous 2 times per day Sonia Fernandes MD   10 mL at 05/12/22 0846    sodium chloride flush 0.9 % injection 10 mL  10 mL IntraVENous PRN Sonia eFrnandes MD        0.9 % sodium chloride infusion   IntraVENous PRN Sonia Fernandes MD        ondansetron (ZOFRAN-ODT) disintegrating tablet 4 mg  4 mg Oral Q8H PRN Sonia Fernandes MD        Or    ondansetron [facility]) injection 4 mg  4 mg IntraVENous Q6H PRN Sonia Fernandes MD        acetaminophen (TYLENOL) tablet 650 mg  650 mg Oral Q6H PRN Sonia Fernandes MD        Or    acetaminophen (TYLENOL) suppository 650 mg  650 mg Rectal Q6H PRN Sonia Fernandes MD        aspirin chewable tablet 81 mg  81 mg Oral Daily Sonia Fernandes MD   81 mg at 05/12/22 0842    enoxaparin (LOVENOX) injection 40 mg  40 mg SubCUTAneous Daily Sonia Fernandes MD   40 mg at 05/12/22 0842    atorvastatin (LIPITOR) tablet 40 mg  40 mg Oral Nightly Sonia Fernandes MD   40 mg at 05/11/22 2101    bisacodyl (DULCOLAX) EC tablet 5 mg  5 mg Oral Daily PRN Sonia Fernandes MD        carvedilol (COREG) tablet 3.125 mg  3.125 mg Oral BID WC Sonia Fernandes MD   3.125 mg at 05/12/22 1209    clopidogrel (PLAVIX) tablet 75 mg  75 mg Oral Daily Sonia Fernandes MD   75 mg at 05/12/22 0842    ezetimibe (ZETIA) tablet 10 mg  10 mg Oral Daily Sonia Fernandes MD   10 mg at 05/12/22 0841    levothyroxine (SYNTHROID) tablet 75 mcg  75 mcg Oral Daily Rubi Smith MD   75 mcg at 05/12/22 0602    lisinopril (PRINIVIL;ZESTRIL) tablet 10 mg  10 mg Oral Daily Rubi Smith MD   10 mg at 05/12/22 2275    pantoprazole (PROTONIX) tablet 40 mg  40 mg Oral Daily Rubi Smith MD   40 mg at 05/12/22 0028    vitamin D (ERGOCALCIFEROL) capsule 50,000 Units  50,000 Units Oral Weekly Rubi Smith MD             sodium chloride flush  5-40 mL IntraVENous 2 times per day    aspirin  81 mg Oral Daily    enoxaparin  40 mg SubCUTAneous Daily    atorvastatin  40 mg Oral Nightly    carvedilol  3.125 mg Oral BID WC    clopidogrel  75 mg Oral Daily    ezetimibe  10 mg Oral Daily    levothyroxine  75 mcg Oral Daily    lisinopril  10 mg Oral Daily    pantoprazole  40 mg Oral Daily    vitamin D  50,000 Units Oral Weekly         Physical Exam:  HENNT: normal  PULMONARY: normal to inspection, palpation, percussion and ascultation  CVS:Normal neck vein, S1 and S2 normal, no murmur  ABD: Mild degree of tenderness on palpation of the lower abdomen with no guarding l  PERIPHERL: all pulses are normal with no bruit  MSK: no swelling of the lower extremities  CNS: Normal CN 2,3,4,6,5,7,9,10,11,and 12. Oriented to time, place and person      RECENT LABS:    Lab Data:  CBC:   Recent Labs     05/11/22  0935 05/12/22  0152   WBC 6.3 6.1   HGB 12.0 10.9*   HCT 38.0 34.8*   MCV 86.2 86.6    274     BMP:   Recent Labs     05/11/22  0935 05/12/22  0152    142   K 3.9 4.0    107   CO2 24 22   BUN 18 16   CREATININE 1.4* 1.2*     LIVER PROFILE:   Recent Labs     05/11/22  0935 05/12/22  0152   AST 13 13   ALT 21 17   BILITOT 0.7 0.5   ALKPHOS 110* 97     PT/INR: No results for input(s): PROTIME, INR in the last 72 hours. APTT: No results for input(s): APTT in the last 72 hours.     CK, CKMB, Troponin:   Recent Labs     05/11/22  0935 05/11/22  1410 05/12/22  1220 TROPONINI <0.01 <0.01 <0.01       Last 3 BNP:  Recent Labs     05/11/22  0935   PROBNP 188       IMAGING:  XR CHEST PORTABLE    Result Date: 5/11/2022  1. No acute findings. 2.  10 mm nodular density in the RIGHT upper lateral lung. This could represent artifact but a pulmonary nodule is also possible. Recommend a follow-up chest radiograph in one month to evaluate for persistence. If this finding persists, would then recommend a chest CT. Signed by Dr Mary De La Rosa and Plan:    Fatigue for 6-month, abnormal nuclear stress test in the territory of the right coronary artery  Stenting of the right coronary artery July 2021  Hypertension, diabetes and hyperlipidemia    Plan: We will proceed with cardiac catheterization tomorrow. The risk and benefit of the procedure have been explained to the patient risks include but not limited to myocardial infarction, cerebral  vascular accident, kidney problem, leg problem, allergy to the dye and possibility of emergency bypass surgery. Patient has given the consent. ASA class: 3, Mallampati class II    I have spent 30 minutes reviewing the chart, taking history, examining the patient, discussion with the patient and the family concerning the finding, diagnoses and treatment plan. This dictation was performed using [address]. Mistake and misspelling may have been created without  realizing them. Please notify me for clarification.   Thank you    Kimberly Rae MD  5/12/2022 1:26 PM

## 2022-05-13 ENCOUNTER — APPOINTMENT (OUTPATIENT)
Dept: GENERAL RADIOLOGY | Age: 82
End: 2022-05-13
Payer: MEDICARE

## 2022-05-13 VITALS
HEIGHT: 64 IN | BODY MASS INDEX: 27.84 KG/M2 | TEMPERATURE: 98 F | WEIGHT: 163.1 LBS | DIASTOLIC BLOOD PRESSURE: 73 MMHG | HEART RATE: 66 BPM | OXYGEN SATURATION: 99 % | RESPIRATION RATE: 17 BRPM | SYSTOLIC BLOOD PRESSURE: 136 MMHG

## 2022-05-13 LAB
GLUCOSE BLD-MCNC: 177 MG/DL (ref 70–99)
GLUCOSE BLD-MCNC: 181 MG/DL (ref 70–99)
PERFORMED ON: ABNORMAL
PERFORMED ON: ABNORMAL
URINE CULTURE, ROUTINE: NORMAL

## 2022-05-13 PROCEDURE — 99152 MOD SED SAME PHYS/QHP 5/>YRS: CPT | Performed by: INTERNAL MEDICINE

## 2022-05-13 PROCEDURE — 2709999900 HC NON-CHARGEABLE SUPPLY

## 2022-05-13 PROCEDURE — 6360000004 HC RX CONTRAST MEDICATION: Performed by: HOSPITALIST

## 2022-05-13 PROCEDURE — 99153 MOD SED SAME PHYS/QHP EA: CPT

## 2022-05-13 PROCEDURE — 93005 ELECTROCARDIOGRAM TRACING: CPT | Performed by: INTERNAL MEDICINE

## 2022-05-13 PROCEDURE — 2580000003 HC RX 258: Performed by: INTERNAL MEDICINE

## 2022-05-13 PROCEDURE — G0378 HOSPITAL OBSERVATION PER HR: HCPCS

## 2022-05-13 PROCEDURE — C1894 INTRO/SHEATH, NON-LASER: HCPCS

## 2022-05-13 PROCEDURE — 74018 RADEX ABDOMEN 1 VIEW: CPT

## 2022-05-13 PROCEDURE — 2500000003 HC RX 250 WO HCPCS

## 2022-05-13 PROCEDURE — 99214 OFFICE O/P EST MOD 30 MIN: CPT | Performed by: INTERNAL MEDICINE

## 2022-05-13 PROCEDURE — 6360000002 HC RX W HCPCS

## 2022-05-13 PROCEDURE — 6370000000 HC RX 637 (ALT 250 FOR IP): Performed by: HOSPITALIST

## 2022-05-13 PROCEDURE — C1887 CATHETER, GUIDING: HCPCS

## 2022-05-13 PROCEDURE — 93454 CORONARY ARTERY ANGIO S&I: CPT | Performed by: INTERNAL MEDICINE

## 2022-05-13 PROCEDURE — 82947 ASSAY GLUCOSE BLOOD QUANT: CPT

## 2022-05-13 PROCEDURE — 93458 L HRT ARTERY/VENTRICLE ANGIO: CPT

## 2022-05-13 PROCEDURE — 99152 MOD SED SAME PHYS/QHP 5/>YRS: CPT

## 2022-05-13 PROCEDURE — C1769 GUIDE WIRE: HCPCS

## 2022-05-13 RX ORDER — AMLODIPINE BESYLATE 5 MG/1
5 TABLET ORAL 2 TIMES DAILY
Status: DISCONTINUED | OUTPATIENT
Start: 2022-05-13 | End: 2022-05-13 | Stop reason: HOSPADM

## 2022-05-13 RX ORDER — AMLODIPINE BESYLATE 5 MG/1
5 TABLET ORAL DAILY
Status: DISCONTINUED | OUTPATIENT
Start: 2022-05-13 | End: 2022-05-13

## 2022-05-13 RX ORDER — ACETAMINOPHEN 325 MG/1
650 TABLET ORAL EVERY 4 HOURS PRN
Status: DISCONTINUED | OUTPATIENT
Start: 2022-05-13 | End: 2022-05-13 | Stop reason: HOSPADM

## 2022-05-13 RX ORDER — SODIUM CHLORIDE 0.9 % (FLUSH) 0.9 %
5-40 SYRINGE (ML) INJECTION PRN
Status: DISCONTINUED | OUTPATIENT
Start: 2022-05-13 | End: 2022-05-13 | Stop reason: HOSPADM

## 2022-05-13 RX ORDER — IODIXANOL 320 MG/ML
28 INJECTION, SOLUTION INTRAVASCULAR
Status: COMPLETED | OUTPATIENT
Start: 2022-05-13 | End: 2022-05-13

## 2022-05-13 RX ORDER — SODIUM CHLORIDE 9 MG/ML
INJECTION, SOLUTION INTRAVENOUS PRN
Status: DISCONTINUED | OUTPATIENT
Start: 2022-05-13 | End: 2022-05-13 | Stop reason: HOSPADM

## 2022-05-13 RX ORDER — SODIUM CHLORIDE 0.9 % (FLUSH) 0.9 %
5-40 SYRINGE (ML) INJECTION EVERY 12 HOURS SCHEDULED
Status: DISCONTINUED | OUTPATIENT
Start: 2022-05-13 | End: 2022-05-13 | Stop reason: HOSPADM

## 2022-05-13 RX ADMIN — LISINOPRIL 10 MG: 10 TABLET ORAL at 08:39

## 2022-05-13 RX ADMIN — EZETIMIBE 10 MG: 10 TABLET ORAL at 08:39

## 2022-05-13 RX ADMIN — LEVOTHYROXINE SODIUM 75 MCG: 75 TABLET ORAL at 06:18

## 2022-05-13 RX ADMIN — CARVEDILOL 3.12 MG: 3.12 TABLET, FILM COATED ORAL at 10:11

## 2022-05-13 RX ADMIN — ASPIRIN 81 MG 81 MG: 81 TABLET ORAL at 08:39

## 2022-05-13 RX ADMIN — CLOPIDOGREL BISULFATE 75 MG: 75 TABLET ORAL at 08:39

## 2022-05-13 RX ADMIN — PANTOPRAZOLE SODIUM 40 MG: 40 TABLET, DELAYED RELEASE ORAL at 08:39

## 2022-05-13 RX ADMIN — IODIXANOL 28 ML: 320 INJECTION, SOLUTION INTRAVASCULAR at 08:17

## 2022-05-13 RX ADMIN — AMLODIPINE BESYLATE 5 MG: 5 TABLET ORAL at 10:11

## 2022-05-13 RX ADMIN — SODIUM CHLORIDE: 9 INJECTION, SOLUTION INTRAVENOUS at 06:19

## 2022-05-13 NOTE — PROCEDURES
Cardiac Catherization        Patient Name: Liu Michael   MRN: [id number]   Age: [de-identified] y.o. : 1940   Admission Date: 2022   Room/Bed: 0717/717-02   PRIMARY CARE PROVIDER   Delaney Bermeo MD        DATE OF PROCEDURE: 2022   INDICATIONS:   This is a [de-identified] y.o. female with recurrence of chest pain and stenting of the mid right coronary artery. Nuclear stress test was slightly abnormal    PROCEDURE:   1. Coronary Angiogram   2. Left heart catheterization          ANESTHESIA   Moderate sedation. The patient was continously monitored by the trained experienced nurse under my supervision with respect to level of  consciousness, and vital signs/physiologic status throughout the case. For further details regarding specific medications and doses please refer to  cath lab procedural notes    DESCRIPTION OF PROCEDURE:   Liu Michael was brought to the cath lab in a fasting state after informed consent was obtained. A pre procedural time out occurred to identify correct patient and procedure. Patient was prepped and draped in the usual sterile fashion. Left radial approach was used. Bleeding was stopped with TR band  Routine use of diagnostic catheters were used for angiogram and different projections. FINDINGS:   HEMODYNAMICS:     BP: 170/70  HR: 80/min    Comments: None    CORONARY ANGIOGRAPHY:   Dominance: Right  Left Main: Normal  LAD: Anterior descending was normal in caliber and barely reaches the apex. It was tortuous and free of significant disease. Diagonal branches were small and free of significantI. Ramus was normal sized vessel and free of significant disease  LCx: Circumflex was normal to small size vessel tortuous and free of significant disease  RCA: Artery artery was a large-caliber vessel. There was a widely patent mid right coronary artery stent.   Otherwise it was free of significant disease          ESTIMATED BLOOD LOSS: Minimal  COMPLICATIONS: None         CONCLUSIONS:   Widely patent mid right coronary artery stent    RECOMMENDATIONS:   Patient's chest pain is is not due to cardiac ischemia. The nuclear stress test is false positive. Patient can be treated medically from cardiac standpoint patient can be discharged    This dictation was performed using Dragon software. Mistake and misspelling may have been created without  realizing them. Please notify me for clarification.   Thank you  Lisa Christie MD  5/13/2022, 8:11 AM

## 2022-05-13 NOTE — DISCHARGE SUMMARY
Physician Discharge Summary     Patient ID:  Ryan Esquivel  [id number]  86 y.o.  1940    Admit date: 5/11/2022    Discharge date and time: 5/13/2022  2:38 PM     Admitting Physician: Dai Atwood MD     Discharge Physician: Dai Atwood MD     Discharge Diagnoses:     Chest pain in pt with CAD with abnormal stress test-  Cardiac cath shows wildly patent RCA stent    HTN  10 mm nodular density in the RIGHT upper lateral lung- Recommend   a follow-up chest radiograph in one month to evaluate for persistence. If this finding persists, would then recommend a chest CT    Discharged Condition: good    Indication for Admission: CP    Hospital Course:     [de-identified] yo female with PMH DM2, HL, HTN, CAD,  inferior wall myocardial infarct treated with stenting of the mid right coronary artery, presented with CP. Patient had stress echo done February 2022 and showed no obvious wall motion abnormality. Echocardiogram showed normal ejection fraction. Pt underwent stress test which was abnormal, cardiac cath showed  wildly patent RCA stent. Pt denies further CP and is cleared for DC by cardiology    CXR showed incidental 10 mm nodule in RUL that needs to be follow up as OP      Consults: cardiology    Significant Diagnostic Studies:     XR ABDOMEN (KUB) (SINGLE AP VIEW) [[id number]] Resulted: 05/13/22 0933      Order Status: Completed Updated: 05/13/22 0935     Narrative:       Examination. XR ABDOMEN (KUB) (SINGLE AP VIEW) 5/13/2022 9:19 AM   History: Abdominal pain. History of Fernandes's esophagus. A frontal projection of the abdomen including the pelvis is obtained. There is no previous study for comparison. The correlation made with   CT scan of the abdomen dated 5/3/2022. Moderate gas and stool in the colon. There is no evidence of   dilatation of small nonenlarged bowel loops. Both kidneys are obscured by the bowel contents. There are no definite   radiopaque calculi in the area of the kidneys. Several phleboliths are seen in the pelvis more to the right of the   midline. There is evidence of previous cholecystectomy. There is no acute bony abnormality.     Impression:       1. Unremarkable abdomen. No evidence of obstruction or ileus. 2. No radiopaque calculi. Signed by Dr Abel Billings Additional Contrast? Oral [[id number]]      Order Status: Canceled      CT ABDOMEN PELVIS W IV CONTRAST Additional Contrast? Oral [[id number]]      Order Status: Canceled      NM MYOCARDIAL SPECT REST EXERCISE OR RX [[id number]] Resulted: 05/12/22 1614     Order Status: Completed Updated: 05/12/22 1617     Narrative:       Lasha Gupta Nuclear Stress Test Report   Procedure date: 5/12/22   Indications: angina   Procedure: Stress was performed with injection of 0.4 mg Lexiscan. Vital signs and EKG were monitored. Technetium-99 sestamibi was   injected in divided doses, approximately 8.0 mCi and 22.7 mCi   respectively for rest and stress imaging. The patient was discharged   in stable condition. Results: Patient had symptoms of dyspnea, nausea, stomach cramping,   aching back, and epigastric discomfort w/ burping during infusion that   resolved in recovery. Baseline EKG showed sinus rhythm with LBBB. During stress there were no significant EKG changes or rhythm changes. Baseline and peak blood pressures were 158/79, and 206/91   respectively.  Baseline and peak heart rates were 71 and  106   respectively. SPECT perfusion imaging study showed moderate area, mild intensity   reversible inferior defect. There is no transient ischemic dilatation   Gated wall motion study was normal with calculated ejection fraction   of 52%   Conclusion:   Abnormal SPECT perfusion imaging study showed moderate area, mild   intensity of reversible inferior defect.  There is no transient   ischemic dilatation   Normal gated wall motion study with calculated ejection fraction of   52%   Stress EKG showed no ischemia or arrhythmia. There was mild degree of   epigastric discomfort   Signed by Dr Quinn Franklin [[id number]] Resulted: 05/11/22 1000     Order Status: Completed Updated: 05/11/22 1002     Narrative:       EXAM: XR CHEST PORTABLE   INDICATION: Chest pain   COMPARISON: None available. FINDINGS:   The cardiac silhouette is normal in size. No pleural effusion,   pneumothorax, or focal consolidation. Calcified granulomas in the   RIGHT lower lobe and RIGHT hilum are noted. There is a small nodular   density in the RIGHT upper lateral lung zone measuring 10 mm. No acute   osseous finding.     Impression:       1.  No acute findings. 2.  10 mm nodular density in the RIGHT upper lateral lung. This could   represent artifact but a pulmonary nodule is also possible. Recommend   a follow-up chest radiograph in one month to evaluate for persistence. If this finding persists, would then recommend a chest CT.    Signed by Dr Wilfred Naranjo             Discharge Exam:  /73   Pulse 66   Temp 98 °F (36.7 °C) (Temporal)   Resp 17   Ht 5' 4\" (1.626 m)   Wt 163 lb 1.6 oz (74 kg)   SpO2 99%   BMI 28.00 kg/m²     General Appearance:    Alert, cooperative, no distress, appears stated age   Head:    Normocephalic, without obvious abnormality, atraumatic           Nose:   Nares normal, septum midline, mucosa normal, no drainage    or sinus tenderness       Neck:   Supple, symmetrical, trachea midline, no adenopathy;     thyroid:  no enlargement/tenderness/nodules; no carotid    bruit or JVD       Lungs:     Clear to auscultation bilaterally, respirations unlabored   Chest Wall:    No tenderness or deformity    Heart:    Regular rate and rhythm, S1 and S2 normal, no murmur, rub   or gallop       Abdomen:     Soft, non-tender, bowel sounds active all four quadrants,     no masses, no organomegaly           Extremities:   Extremities normal, atraumatic, no cyanosis or edema   Pulses:   2+ and symmetric all extremities           Neurologic:   Normal strength, sensation and reflexes     throughout       Discharge Medications:         Medication List      CONTINUE taking these medications    aspirin EC 81 MG EC tablet  Take 1 tablet by mouth daily     carvedilol 3.125 MG tablet  Commonly known as: COREG  Take 1 tablet by mouth 2 times daily (with meals)     clopidogrel 75 MG tablet  Commonly known as: PLAVIX  Take 1 tablet by mouth daily     ezetimibe 10 MG tablet  Commonly known as: ZETIA     GLUCOPHAGE PO     insulin glargine 100 UNIT/ML injection vial  Commonly known as: LANTUS     levothyroxine 75 MCG tablet  Commonly known as: SYNTHROID     LISINOPRIL PO     pantoprazole 40 MG tablet  Commonly known as: PROTONIX     vitamin D 1.25 MG (13694 UT) Caps capsule  Commonly known as: ERGOCALCIFEROL        STOP taking these medications    CeleBREX 200 MG capsule  Generic drug: celecoxib              Discharge Instructions: Follow up with Tani Tyler DO in 5 days. With Dr Neto León in one month. Take medications as directed. Resume activity as tolerated.     Diet: Cardiac    Disposition: Patient is medically stable and will be discharged home    DC obs

## 2022-05-13 NOTE — PLAN OF CARE
Problem: Discharge Planning  Goal: Discharge to home or other facility with appropriate resources  5/13/2022 0429 by Renetta Upton RN  Outcome: Progressing  5/12/2022 1909 by Elena Montgomery RN  Outcome: Progressing     Problem: Safety - Adult  Goal: Free from fall injury  5/13/2022 0429 by Renetta Upton RN  Outcome: Progressing  5/12/2022 1909 by Elena Montgomery RN  Outcome: Progressing     Problem: ABCDS Injury Assessment  Goal: Absence of physical injury  5/13/2022 0429 by Renetta Upton RN  Outcome: Progressing  5/12/2022 1909 by Elena Montgomery RN  Outcome: Progressing  Flowsheets (Taken 5/12/2022 0845)  Absence of Physical Injury: Implement safety measures based on patient assessment     Problem: Pain  Goal: Verbalizes/displays adequate comfort level or baseline comfort level  5/13/2022 0429 by Renetta Upton RN  Outcome: Progressing  5/12/2022 1909 by Elena Montgomery RN  Outcome: Progressing

## 2022-05-13 NOTE — PROGRESS NOTES
4\" (1.626 m)   Wt 163 lb 1.6 oz (74 kg)   SpO2 98%   BMI 28.00 kg/m²       Intake/Output Summary (Last 24 hours) at 5/13/2022 0815  Last data filed at 5/13/2022 0535  Gross per 24 hour   Intake 480 ml   Output 1150 ml   Net -670 ml       Current Facility-Administered Medications   Medication Dose Route Frequency Provider Last Rate Last Admin    iodixanol (VISIPAQUE) 320 MG/ML injection 28 mL  28 mL Other ONCE PRN Khoa Pugh MD        sodium chloride flush 0.9 % injection 5-40 mL  5-40 mL IntraVENous 2 times per day Kimberly Rae MD        sodium chloride flush 0.9 % injection 5-40 mL  5-40 mL IntraVENous PRN Kimberly Rae MD        0.9 % sodium chloride infusion   IntraVENous PRN Kimberly Rae MD        0.9 % sodium chloride infusion   IntraVENous Continuous Kimberly Rae MD 75 mL/hr at 05/13/22 0619 New Bag at 05/13/22 0619    nitroGLYCERIN (NITROSTAT) SL tablet 0.4 mg  0.4 mg SubLINGual Q5 Min PRN JAYLIN Dooley   0.4 mg at 05/11/22 1017    sodium chloride flush 0.9 % injection 5-40 mL  5-40 mL IntraVENous 2 times per day Khoa Pugh MD   10 mL at 05/12/22 0846    sodium chloride flush 0.9 % injection 10 mL  10 mL IntraVENous PRN Khoa Pugh MD        0.9 % sodium chloride infusion   IntraVENous PRN Khoa Pugh MD        ondansetron (ZOFRAN-ODT) disintegrating tablet 4 mg  4 mg Oral Q8H PRN Khoa Pugh MD        Or    ondansetron [facility]) injection 4 mg  4 mg IntraVENous Q6H PRN Khoa Pugh MD        acetaminophen (TYLENOL) tablet 650 mg  650 mg Oral Q6H PRN Khoa Pugh MD        Or    acetaminophen (TYLENOL) suppository 650 mg  650 mg Rectal Q6H PRN Khoa Pugh MD        aspirin chewable tablet 81 mg  81 mg Oral Daily Khoa Pugh MD   81 mg at 05/12/22 0842    enoxaparin (LOVENOX) injection 40 mg  40 mg SubCUTAneous Daily Khoa Pugh MD   40 mg at 05/12/22 0842    atorvastatin (LIPITOR) tablet 40 mg  40 mg Oral Nightly Luda Mccord MD   40 mg at 05/12/22 2221    bisacodyl (DULCOLAX) EC tablet 5 mg  5 mg Oral Daily PRN Luda Mccord MD        carvedilol (COREG) tablet 3.125 mg  3.125 mg Oral BID WC Luda Mccord MD   3.125 mg at 05/12/22 1658    clopidogrel (PLAVIX) tablet 75 mg  75 mg Oral Daily Luda Mccord MD   75 mg at 05/12/22 3799    ezetimibe (ZETIA) tablet 10 mg  10 mg Oral Daily Luda Mccord MD   10 mg at 05/12/22 0841    levothyroxine (SYNTHROID) tablet 75 mcg  75 mcg Oral Daily Luda Mccord MD   75 mcg at 05/13/22 0618    lisinopril (PRINIVIL;ZESTRIL) tablet 10 mg  10 mg Oral Daily Luda Mccord MD   10 mg at 05/12/22 5109    pantoprazole (PROTONIX) tablet 40 mg  40 mg Oral Daily Luda Mccord MD   40 mg at 05/12/22 1135    vitamin D (ERGOCALCIFEROL) capsule 50,000 Units  50,000 Units Oral Weekly Luda Mccord MD             sodium chloride flush  5-40 mL IntraVENous 2 times per day    sodium chloride flush  5-40 mL IntraVENous 2 times per day    aspirin  81 mg Oral Daily    enoxaparin  40 mg SubCUTAneous Daily    atorvastatin  40 mg Oral Nightly    carvedilol  3.125 mg Oral BID WC    clopidogrel  75 mg Oral Daily    ezetimibe  10 mg Oral Daily    levothyroxine  75 mcg Oral Daily    lisinopril  10 mg Oral Daily    pantoprazole  40 mg Oral Daily    vitamin D  50,000 Units Oral Weekly         Physical Exam:  General: NAD, alert  HEENT: normal  CHEST: clear to ascultation  CVS: S1 and S2 normal, no murmur, no JVD  ABD; nontender, bowel sounds normal, liver and spleen not palpable  MSK: normal  CNS: oriented X3, normal affect, normal CN  PVD:  all peripheral pulses normal, no swelling of the ankles      RECENT LABS:    Lab Data:  CBC:   Recent Labs     05/11/22  0935 05/12/22  0152 05/12/22  1824   WBC 6.3 6.1 7.2   HGB 12.0 10.9* 11.8*   HCT 38.0 34.8* 40.6   MCV 86.2 86.6 96.2    274 294     BMP:   Recent Labs     05/11/22  0935 05/12/22  0152 05/12/22  1953    142 136   K 3.9 4.0 4.1    107 101   CO2 24 22 23   BUN 18 16 20   CREATININE 1.4* 1.2* 1.4*     LIVER PROFILE:   Recent Labs     05/11/22  0935 05/12/22  0152   AST 13 13   ALT 21 17   BILITOT 0.7 0.5   ALKPHOS 110* 97     PT/INR: No results for input(s): PROTIME, INR in the last 72 hours. APTT: No results for input(s): APTT in the last 72 hours. CK, CKMB, Troponin:   Recent Labs     05/11/22  0935 05/11/22  1410 05/12/22  1220   TROPONINI <0.01 <0.01 <0.01       Last 3 BNP:  Recent Labs     05/11/22  0935   PROBNP 188       IMAGING:  XR CHEST PORTABLE    Result Date: 5/11/2022  1. No acute findings. 2.  10 mm nodular density in the RIGHT upper lateral lung. This could represent artifact but a pulmonary nodule is also possible. Recommend a follow-up chest radiograph in one month to evaluate for persistence. If this finding persists, would then recommend a chest CT. Signed by Dr Sudha Acharya and Plan:    Widely patent mid right coronary artery stent  False positive nuclear stress test.  Hypertension    Plan: From cardiac standpoint patient can be discharged    I have spent 30 minutes reviewing the chart, taking history, examining the patient, discussion with the patient and the family concerning the finding, diagnoses and treatment plan. This dictation was performed using [address] [place]. Mistake and misspelling may have been created without  realizing them. Please notify me for clarification.   Thank you    Sintia Blanton MD  5/13/2022 8:15 AM

## 2022-05-13 NOTE — PLAN OF CARE
Problem: Discharge Planning  Goal: Discharge to home or other facility with appropriate resources  Outcome: Progressing     Problem: Safety - Adult  Goal: Free from fall injury  Outcome: Progressing     Problem: ABCDS Injury Assessment  Goal: Absence of physical injury  Outcome: Progressing  Flowsheets (Taken 5/12/2022 2006)  Absence of Physical Injury: Implement safety measures based on patient assessment     Problem: Pain  Goal: Verbalizes/displays adequate comfort level or baseline comfort level  Outcome: Progressing

## 2022-05-15 LAB
EKG P AXIS: 31 DEGREES
EKG P-R INTERVAL: 246 MS
EKG Q-T INTERVAL: 412 MS
EKG QRS DURATION: 84 MS
EKG QTC CALCULATION (BAZETT): 415 MS
EKG T AXIS: -18 DEGREES

## 2022-05-15 PROCEDURE — 93010 ELECTROCARDIOGRAM REPORT: CPT | Performed by: INTERNAL MEDICINE

## 2022-05-16 ENCOUNTER — HOSPITAL ENCOUNTER (OUTPATIENT)
Dept: CT IMAGING | Age: 82
Discharge: HOME | End: 2022-05-16
Payer: MEDICARE

## 2022-05-16 DIAGNOSIS — R10.13 MIDEPIGASTRIC PAIN: ICD-10-CM

## 2022-05-16 DIAGNOSIS — Z79.4 TYPE 2 DIABETES MELLITUS WITH CHRONIC KIDNEY DISEASE, WITH LONG-TERM CURRENT USE OF INSULIN, UNSPECIFIED CKD STAGE (HCC): ICD-10-CM

## 2022-05-16 DIAGNOSIS — I25.2 HISTORY OF MI (MYOCARDIAL INFARCTION): ICD-10-CM

## 2022-05-16 DIAGNOSIS — E11.22 TYPE 2 DIABETES MELLITUS WITH CHRONIC KIDNEY DISEASE, WITH LONG-TERM CURRENT USE OF INSULIN, UNSPECIFIED CKD STAGE (HCC): ICD-10-CM

## 2022-05-16 DIAGNOSIS — Z87.11 HISTORY OF GASTRIC ULCER: ICD-10-CM

## 2022-05-16 PROCEDURE — 74176 CT ABD & PELVIS W/O CONTRAST: CPT

## 2022-05-21 ASSESSMENT — ENCOUNTER SYMPTOMS
SHORTNESS OF BREATH: 0
SORE THROAT: 0
WHEEZING: 0
VOMITING: 0
CHEST TIGHTNESS: 1
NAUSEA: 0
ABDOMINAL PAIN: 0
COLOR CHANGE: 0
TROUBLE SWALLOWING: 0

## 2022-08-03 ENCOUNTER — TRANSCRIBE ORDERS (OUTPATIENT)
Dept: ADMINISTRATIVE | Facility: HOSPITAL | Age: 82
End: 2022-08-03

## 2022-08-03 DIAGNOSIS — R91.1 COIN LESION: ICD-10-CM

## 2022-08-03 DIAGNOSIS — Z78.0 POSTMENOPAUSAL STATUS: Primary | ICD-10-CM

## 2022-08-03 DIAGNOSIS — Z12.31 ENCOUNTER FOR SCREENING MAMMOGRAM FOR MALIGNANT NEOPLASM OF BREAST: ICD-10-CM

## 2022-10-15 ENCOUNTER — HOSPITAL ENCOUNTER (INPATIENT)
Age: 82
LOS: 1 days | Discharge: HOME | DRG: 552 | End: 2022-10-16
Attending: HOSPITALIST | Admitting: PSYCHIATRY & NEUROLOGY
Payer: MEDICARE

## 2022-10-15 ENCOUNTER — APPOINTMENT (OUTPATIENT)
Dept: MRI IMAGING | Age: 82
DRG: 552 | End: 2022-10-15
Attending: HOSPITALIST
Payer: MEDICARE

## 2022-10-15 PROBLEM — I63.9 LEFT-SIDED CEREBROVASCULAR ACCIDENT (CVA) (HCC): Status: ACTIVE | Noted: 2022-10-15

## 2022-10-15 LAB
ALBUMIN SERPL-MCNC: 3.9 G/DL (ref 3.5–5.2)
ALP BLD-CCNC: 67 U/L (ref 35–104)
ALT SERPL-CCNC: 10 U/L (ref 5–33)
ANION GAP SERPL CALCULATED.3IONS-SCNC: 13 MMOL/L (ref 7–19)
AST SERPL-CCNC: 13 U/L (ref 5–32)
BASOPHILS ABSOLUTE: 0.1 K/UL (ref 0–0.2)
BASOPHILS RELATIVE PERCENT: 0.8 % (ref 0–1)
BILIRUB SERPL-MCNC: 0.5 MG/DL (ref 0.2–1.2)
BUN BLDV-MCNC: 20 MG/DL (ref 8–23)
CALCIUM SERPL-MCNC: 9.1 MG/DL (ref 8.8–10.2)
CHLORIDE BLD-SCNC: 104 MMOL/L (ref 98–111)
CHOLESTEROL, TOTAL: 173 MG/DL (ref 160–199)
CO2: 24 MMOL/L (ref 22–29)
CREAT SERPL-MCNC: 1 MG/DL (ref 0.5–0.9)
D DIMER: 0.27 UG/ML FEU (ref 0–0.48)
EOSINOPHILS ABSOLUTE: 0.2 K/UL (ref 0–0.6)
EOSINOPHILS RELATIVE PERCENT: 3.4 % (ref 0–5)
GFR AFRICAN AMERICAN: >59
GFR NON-AFRICAN AMERICAN: 53
GLUCOSE BLD-MCNC: 101 MG/DL (ref 70–99)
GLUCOSE BLD-MCNC: 115 MG/DL (ref 74–109)
GLUCOSE BLD-MCNC: 122 MG/DL (ref 70–99)
GLUCOSE BLD-MCNC: 135 MG/DL (ref 70–99)
GLUCOSE BLD-MCNC: 84 MG/DL (ref 70–99)
HBA1C MFR BLD: 5.8 % (ref 4–6)
HCT VFR BLD CALC: 34.9 % (ref 37–47)
HDLC SERPL-MCNC: 42 MG/DL (ref 65–121)
HEMOGLOBIN: 11 G/DL (ref 12–16)
IMMATURE GRANULOCYTES #: 0 K/UL
INR BLD: 0.96 (ref 0.88–1.18)
LDL CHOLESTEROL CALCULATED: 113 MG/DL
LYMPHOCYTES ABSOLUTE: 2.5 K/UL (ref 1.1–4.5)
LYMPHOCYTES RELATIVE PERCENT: 41.3 % (ref 20–40)
MAGNESIUM: 1.8 MG/DL (ref 1.6–2.4)
MCH RBC QN AUTO: 27.4 PG (ref 27–31)
MCHC RBC AUTO-ENTMCNC: 31.5 G/DL (ref 33–37)
MCV RBC AUTO: 86.8 FL (ref 81–99)
MONOCYTES ABSOLUTE: 0.4 K/UL (ref 0–0.9)
MONOCYTES RELATIVE PERCENT: 6.1 % (ref 0–10)
NEUTROPHILS ABSOLUTE: 2.9 K/UL (ref 1.5–7.5)
NEUTROPHILS RELATIVE PERCENT: 48.1 % (ref 50–65)
PDW BLD-RTO: 13.1 % (ref 11.5–14.5)
PERFORMED ON: ABNORMAL
PERFORMED ON: NORMAL
PHOSPHORUS: 2.8 MG/DL (ref 2.5–4.5)
PLATELET # BLD: 237 K/UL (ref 130–400)
PMV BLD AUTO: 10.1 FL (ref 9.4–12.3)
POTASSIUM SERPL-SCNC: 3.8 MMOL/L (ref 3.5–5)
PRO-BNP: 1692 PG/ML (ref 0–1800)
PROTHROMBIN TIME: 12.6 SEC (ref 12–14.6)
RBC # BLD: 4.02 M/UL (ref 4.2–5.4)
SODIUM BLD-SCNC: 141 MMOL/L (ref 136–145)
T4 FREE: 1.19 NG/DL (ref 0.93–1.7)
TOTAL PROTEIN: 6.4 G/DL (ref 6.6–8.7)
TRIGL SERPL-MCNC: 90 MG/DL (ref 0–149)
TROPONIN: 0.07 NG/ML (ref 0–0.03)
TROPONIN: 0.07 NG/ML (ref 0–0.03)
TROPONIN: 0.08 NG/ML (ref 0–0.03)
TSH REFLEX FT4: 6.16 UIU/ML (ref 0.35–5.5)
VITAMIN B-12: 499 PG/ML (ref 211–946)
WBC # BLD: 6.1 K/UL (ref 4.8–10.8)

## 2022-10-15 PROCEDURE — 6360000004 HC RX CONTRAST MEDICATION: Performed by: PSYCHIATRY & NEUROLOGY

## 2022-10-15 PROCEDURE — G0378 HOSPITAL OBSERVATION PER HR: HCPCS

## 2022-10-15 PROCEDURE — 80053 COMPREHEN METABOLIC PANEL: CPT

## 2022-10-15 PROCEDURE — 93880 EXTRACRANIAL BILAT STUDY: CPT

## 2022-10-15 PROCEDURE — 99221 1ST HOSP IP/OBS SF/LOW 40: CPT | Performed by: INTERNAL MEDICINE

## 2022-10-15 PROCEDURE — 83735 ASSAY OF MAGNESIUM: CPT

## 2022-10-15 PROCEDURE — 92523 SPEECH SOUND LANG COMPREHEN: CPT

## 2022-10-15 PROCEDURE — 80061 LIPID PANEL: CPT

## 2022-10-15 PROCEDURE — A9577 INJ MULTIHANCE: HCPCS | Performed by: PSYCHIATRY & NEUROLOGY

## 2022-10-15 PROCEDURE — 6360000002 HC RX W HCPCS: Performed by: HOSPITALIST

## 2022-10-15 PROCEDURE — 2140000000 HC CCU INTERMEDIATE R&B

## 2022-10-15 PROCEDURE — 83880 ASSAY OF NATRIURETIC PEPTIDE: CPT

## 2022-10-15 PROCEDURE — 84484 ASSAY OF TROPONIN QUANT: CPT

## 2022-10-15 PROCEDURE — 72156 MRI NECK SPINE W/O & W/DYE: CPT

## 2022-10-15 PROCEDURE — 99223 1ST HOSP IP/OBS HIGH 75: CPT | Performed by: PSYCHIATRY & NEUROLOGY

## 2022-10-15 PROCEDURE — 84443 ASSAY THYROID STIM HORMONE: CPT

## 2022-10-15 PROCEDURE — 6370000000 HC RX 637 (ALT 250 FOR IP): Performed by: HOSPITALIST

## 2022-10-15 PROCEDURE — 2580000003 HC RX 258: Performed by: HOSPITALIST

## 2022-10-15 PROCEDURE — 70553 MRI BRAIN STEM W/O & W/DYE: CPT | Performed by: RADIOLOGY

## 2022-10-15 PROCEDURE — 85379 FIBRIN DEGRADATION QUANT: CPT

## 2022-10-15 PROCEDURE — 85025 COMPLETE CBC W/AUTO DIFF WBC: CPT

## 2022-10-15 PROCEDURE — 85610 PROTHROMBIN TIME: CPT

## 2022-10-15 PROCEDURE — 83036 HEMOGLOBIN GLYCOSYLATED A1C: CPT

## 2022-10-15 PROCEDURE — 84439 ASSAY OF FREE THYROXINE: CPT

## 2022-10-15 PROCEDURE — 72156 MRI NECK SPINE W/O & W/DYE: CPT | Performed by: RADIOLOGY

## 2022-10-15 PROCEDURE — 82607 VITAMIN B-12: CPT

## 2022-10-15 PROCEDURE — 84100 ASSAY OF PHOSPHORUS: CPT

## 2022-10-15 PROCEDURE — 36415 COLL VENOUS BLD VENIPUNCTURE: CPT

## 2022-10-15 PROCEDURE — 92610 EVALUATE SWALLOWING FUNCTION: CPT

## 2022-10-15 PROCEDURE — 93005 ELECTROCARDIOGRAM TRACING: CPT | Performed by: INTERNAL MEDICINE

## 2022-10-15 PROCEDURE — 96372 THER/PROPH/DIAG INJ SC/IM: CPT

## 2022-10-15 PROCEDURE — 82947 ASSAY GLUCOSE BLOOD QUANT: CPT

## 2022-10-15 PROCEDURE — G0379 DIRECT REFER HOSPITAL OBSERV: HCPCS

## 2022-10-15 PROCEDURE — 70553 MRI BRAIN STEM W/O & W/DYE: CPT

## 2022-10-15 RX ORDER — NITROGLYCERIN 0.4 MG/1
0.4 TABLET SUBLINGUAL EVERY 5 MIN PRN
Status: DISCONTINUED | OUTPATIENT
Start: 2022-10-15 | End: 2022-10-16 | Stop reason: HOSPADM

## 2022-10-15 RX ORDER — DEXTROSE MONOHYDRATE 100 MG/ML
INJECTION, SOLUTION INTRAVENOUS CONTINUOUS PRN
Status: DISCONTINUED | OUTPATIENT
Start: 2022-10-15 | End: 2022-10-16 | Stop reason: HOSPADM

## 2022-10-15 RX ORDER — SODIUM CHLORIDE 0.9 % (FLUSH) 0.9 %
5-40 SYRINGE (ML) INJECTION PRN
Status: DISCONTINUED | OUTPATIENT
Start: 2022-10-15 | End: 2022-10-16 | Stop reason: HOSPADM

## 2022-10-15 RX ORDER — SODIUM CHLORIDE 0.9 % (FLUSH) 0.9 %
5-40 SYRINGE (ML) INJECTION EVERY 12 HOURS SCHEDULED
Status: DISCONTINUED | OUTPATIENT
Start: 2022-10-15 | End: 2022-10-16 | Stop reason: HOSPADM

## 2022-10-15 RX ORDER — SODIUM CHLORIDE 9 MG/ML
INJECTION, SOLUTION INTRAVENOUS PRN
Status: DISCONTINUED | OUTPATIENT
Start: 2022-10-15 | End: 2022-10-16 | Stop reason: HOSPADM

## 2022-10-15 RX ORDER — ASPIRIN 300 MG/1
300 SUPPOSITORY RECTAL DAILY
Status: DISCONTINUED | OUTPATIENT
Start: 2022-10-15 | End: 2022-10-16 | Stop reason: HOSPADM

## 2022-10-15 RX ORDER — ACETAMINOPHEN 650 MG/1
650 SUPPOSITORY RECTAL EVERY 6 HOURS PRN
Status: DISCONTINUED | OUTPATIENT
Start: 2022-10-15 | End: 2022-10-16 | Stop reason: HOSPADM

## 2022-10-15 RX ORDER — BISACODYL 10 MG
10 SUPPOSITORY, RECTAL RECTAL DAILY PRN
Status: DISCONTINUED | OUTPATIENT
Start: 2022-10-15 | End: 2022-10-16 | Stop reason: HOSPADM

## 2022-10-15 RX ORDER — ATORVASTATIN CALCIUM 40 MG/1
80 TABLET, FILM COATED ORAL NIGHTLY
Status: DISCONTINUED | OUTPATIENT
Start: 2022-10-15 | End: 2022-10-16 | Stop reason: HOSPADM

## 2022-10-15 RX ORDER — ONDANSETRON 4 MG/1
4 TABLET, ORALLY DISINTEGRATING ORAL EVERY 8 HOURS PRN
Status: DISCONTINUED | OUTPATIENT
Start: 2022-10-15 | End: 2022-10-16 | Stop reason: HOSPADM

## 2022-10-15 RX ORDER — LEVOTHYROXINE SODIUM 0.07 MG/1
75 TABLET ORAL DAILY
Status: DISCONTINUED | OUTPATIENT
Start: 2022-10-15 | End: 2022-10-16 | Stop reason: HOSPADM

## 2022-10-15 RX ORDER — LABETALOL HYDROCHLORIDE 5 MG/ML
10 INJECTION, SOLUTION INTRAVENOUS EVERY 10 MIN PRN
Status: DISCONTINUED | OUTPATIENT
Start: 2022-10-15 | End: 2022-10-16 | Stop reason: HOSPADM

## 2022-10-15 RX ORDER — INSULIN LISPRO 100 [IU]/ML
0-4 INJECTION, SOLUTION INTRAVENOUS; SUBCUTANEOUS
Status: DISCONTINUED | OUTPATIENT
Start: 2022-10-15 | End: 2022-10-16 | Stop reason: HOSPADM

## 2022-10-15 RX ORDER — ACETAMINOPHEN 325 MG/1
650 TABLET ORAL EVERY 6 HOURS PRN
Status: DISCONTINUED | OUTPATIENT
Start: 2022-10-15 | End: 2022-10-16 | Stop reason: HOSPADM

## 2022-10-15 RX ORDER — PANTOPRAZOLE SODIUM 40 MG/1
40 TABLET, DELAYED RELEASE ORAL DAILY
Status: DISCONTINUED | OUTPATIENT
Start: 2022-10-15 | End: 2022-10-16 | Stop reason: HOSPADM

## 2022-10-15 RX ORDER — MECOBALAMIN 5000 MCG
5 TABLET,DISINTEGRATING ORAL NIGHTLY PRN
Status: DISCONTINUED | OUTPATIENT
Start: 2022-10-15 | End: 2022-10-16 | Stop reason: HOSPADM

## 2022-10-15 RX ORDER — INSULIN LISPRO 100 [IU]/ML
0-4 INJECTION, SOLUTION INTRAVENOUS; SUBCUTANEOUS NIGHTLY
Status: DISCONTINUED | OUTPATIENT
Start: 2022-10-15 | End: 2022-10-16 | Stop reason: HOSPADM

## 2022-10-15 RX ORDER — ASPIRIN 81 MG/1
81 TABLET ORAL DAILY
Status: DISCONTINUED | OUTPATIENT
Start: 2022-10-15 | End: 2022-10-16 | Stop reason: HOSPADM

## 2022-10-15 RX ORDER — INSULIN GLARGINE 100 [IU]/ML
16 INJECTION, SOLUTION SUBCUTANEOUS NIGHTLY
Status: DISCONTINUED | OUTPATIENT
Start: 2022-10-15 | End: 2022-10-16 | Stop reason: HOSPADM

## 2022-10-15 RX ORDER — ONDANSETRON 2 MG/ML
4 INJECTION INTRAMUSCULAR; INTRAVENOUS EVERY 6 HOURS PRN
Status: DISCONTINUED | OUTPATIENT
Start: 2022-10-15 | End: 2022-10-16 | Stop reason: HOSPADM

## 2022-10-15 RX ORDER — CALCIUM CARBONATE 200(500)MG
500 TABLET,CHEWABLE ORAL 3 TIMES DAILY PRN
Status: DISCONTINUED | OUTPATIENT
Start: 2022-10-15 | End: 2022-10-16 | Stop reason: HOSPADM

## 2022-10-15 RX ORDER — ENOXAPARIN SODIUM 100 MG/ML
40 INJECTION SUBCUTANEOUS DAILY
Status: DISCONTINUED | OUTPATIENT
Start: 2022-10-15 | End: 2022-10-16 | Stop reason: HOSPADM

## 2022-10-15 RX ADMIN — PANTOPRAZOLE SODIUM 40 MG: 40 TABLET, DELAYED RELEASE ORAL at 10:00

## 2022-10-15 RX ADMIN — ENOXAPARIN SODIUM 40 MG: 100 INJECTION SUBCUTANEOUS at 16:29

## 2022-10-15 RX ADMIN — SODIUM CHLORIDE, PRESERVATIVE FREE 10 ML: 5 INJECTION INTRAVENOUS at 10:01

## 2022-10-15 RX ADMIN — INSULIN GLARGINE 16 UNITS: 100 INJECTION, SOLUTION SUBCUTANEOUS at 20:33

## 2022-10-15 RX ADMIN — ASPIRIN 81 MG: 81 TABLET, COATED ORAL at 16:29

## 2022-10-15 RX ADMIN — SODIUM CHLORIDE, PRESERVATIVE FREE 10 ML: 5 INJECTION INTRAVENOUS at 20:33

## 2022-10-15 RX ADMIN — LEVOTHYROXINE SODIUM 75 MCG: 75 TABLET ORAL at 06:01

## 2022-10-15 RX ADMIN — GADOBENATE DIMEGLUMINE 15 ML: 529 INJECTION, SOLUTION INTRAVENOUS at 13:26

## 2022-10-15 ASSESSMENT — ENCOUNTER SYMPTOMS
VOMITING: 0
ABDOMINAL PAIN: 0
DIARRHEA: 0
NAUSEA: 1

## 2022-10-15 ASSESSMENT — LIFESTYLE VARIABLES
HOW MANY STANDARD DRINKS CONTAINING ALCOHOL DO YOU HAVE ON A TYPICAL DAY: 1 OR 2
HOW OFTEN DO YOU HAVE A DRINK CONTAINING ALCOHOL: MONTHLY OR LESS

## 2022-10-15 NOTE — PROGRESS NOTES
Vascular lab preliminary results. Bilateral carotid duplex ultrasound performed. Bilateral < 50% stenosis. Final report pending.

## 2022-10-15 NOTE — CONSULTS
[facility] Neurology Consult      Patient:   Analia Grimes  MR#:    [id number]  Account Number:                   [id number]      Room:    13/713-02   YOB: 1940  Date of Progress Note: 10/15/2022  Time of Note                           12:14 PM  Attending Physician:  Elena Santos MD  Consulting Physician:  Jim Davis DO       CHIEF COMPLAINT:   Weakness, throat tightness    HISTORY OF PRESENT ILLNESS:   This is a [de-identified] y.o. female who was admitted with noting an atypical pressure sensation in the neck and throat that is occurred off and on over the past few days. She also notes some possible upper extremity weakness. She denies francisco slurred speech, facial drooping, or focal numbness. She does note some is decreased stress at home taking care of of her  who has been ill. She does have a prior history of a myocardial infarction. She currently denies chest pain. She denies visual changes. She does not have a prior stroke history. No history of atrial fibrillation or carotid artery disease. She denies headaches. Blood pressure has been elevated. Her troponin is also elevated as well though she is not actively having chest pain. Possible acute stroke noted on outside CT. REVIEW OF SYSTEMS:  Constitutional - No fever or chills. HENT -  No Scalp tenderness. No tinnitus or significant hearing loss. No nose bleeding, no sore throat. Eyes - No sudden vision change or eye pain  Respiratory - No significant shortness of breath or cough  Cardiovascular - No chest pain. No palpitations or significant leg swelling  Gastrointestinal - No abdominal swelling or pain. Genitourinary - No difficulty urinating, dysuria  Musculoskeletal - No back pain or myalgia. Skin - No color change or rash  Neurologic - No seizures. No lateralizing weakness. No numbness. Hematologic - No easy bruising or spontaneous bleeding. Psychiatric - No anxiety.      PAST MEDICAL HISTORY: Diagnosis Date    Fernandes's esophagus     DDD (degenerative disc disease), lumbar     DM (diabetes mellitus), type 2 ([facility] [address]     Duodenal ulcer     E. coli sepsis ([facility] [address] 02/2014    Elevated pancreatic enzyme 2017    Gastritis     GERD (gastroesophageal reflux disease)     Hiatal hernia     History of colon polyps     HTN (hypertension)     Hypercholesterolemia     Hypothyroidism     Osteoarthritis     other     Non Specified Ulcers    Pernicious anemia     Skin cancer     History OF. Venous angioma     left-parietal       PAST SURGICAL HISTORY:      Procedure Laterality Date    BREAST SURGERY      BG Biopsy    CHOLECYSTECTOMY, OPEN      COLONOSCOPY  03/16/2005    DR. OTERO    COLONOSCOPY  05/21/2010    tubular adenoma    COLONOSCOPY  12/31/2015    Dr Margarito Alexandra, normal, 5 yr recall    FRACTURE SURGERY      2002 performed and 2003 pins removed, then had a replacement in 2018    HAND SURGERY Right 07/2012    Dr Tyrone Rodriguez removed due to arthritis and tendon repair    HYSTERECTOMY, TOTAL ABDOMINAL (CERVIX REMOVED)  [facility]  11/2008    L5-S1-Dr [address]   2005    OTHER SURGICAL HISTORY Right     Basa Cell Lesion Removal-elbow    OTHER SURGICAL HISTORY Right 2002    ORIF ankle    [place] EGD INTRMURAL NEEDLE ASPIR/BIOP ALTERED ANATOMY N/A 08/15/2018    Dr Josh Green EGD TRANSORAL BIOPSY SINGLE/MULTIPLE N/A 12/06/2017    EGD BIOPSY performed by Annmarie Hernandez DO at [facility] Right     ROTATOR CUFF REPAIR Left 12/2013    Dr Corbin Shane  05/19/2005    DR. KIM    UPPER GASTROINTESTINAL ENDOSCOPY  10/22/2009    peptic stricture, schatzki ring dilated 46 fr, sm hiatal hernia, gastritis    UPPER GASTROINTESTINAL ENDOSCOPY  10/01/2014    Dr Hauser-empiric dilation, urease neg    UPPER GASTROINTESTINAL ENDOSCOPY N/A 12/18/2015    Dr Margarito Alexandra, stricture/ulcers, repeat 8 wks    UPPER GASTROINTESTINAL ENDOSCOPY  12/06/2017    Dr Hauser-w/dilation over wire, [address] [place]-hiatal hernia, distal esophageal stricture    UPPER GASTROINTESTINAL ENDOSCOPY  08/15/2018    Dr SANYA Finley-w/dilation over wire-[address] and EUS-Possible duodenitis, gastritis, duodenal stricture likely peptic stricture nature, hiatal hernia    UPPER GASTROINTESTINAL ENDOSCOPY  08/15/2018    Dr SANYA Finley-w/dilation over wire-[address] [place] and EUS-Possible duodenitis, gastritis, duodenal stricture likely peptic stricture nature, hiatal hernia       SOCIAL HISTORY:   TOBACCO:   reports that she quit smoking about 33 years ago. Her smoking use included cigarettes. She has a 5.00 pack-year smoking history. She has never used smokeless tobacco.  ETOH:   reports current alcohol use.   DRUG:    Social History     Substance and Sexual Activity   Drug Use Never       FAMILY HISTORY:       Problem Relation Age of Onset    Breast Cancer Mother     Blindness Son         injured in a [employer] fight, he was a guard    Kidney stones Son     No Known Problems Daughter     Colon Cancer Neg Hx     Colon Polyps Neg Hx     Esophageal Cancer Neg Hx     Liver Disease Neg Hx     Liver Cancer Neg Hx     Rectal Cancer Neg Hx     Stomach Cancer Neg Hx        MEDICATIONS:      Current Facility-Administered Medications:     enoxaparin (LOVENOX) injection 40 mg, 40 mg, SubCUTAneous, Daily, Nila Malin MD    aspirin EC tablet 81 mg, 81 mg, Oral, Daily **OR** aspirin suppository 300 mg, 300 mg, Rectal, Daily, Nila Malin MD    sodium chloride flush 0.9 % injection 5-40 mL, 5-40 mL, IntraVENous, 2 times per day, Nila Malin MD, 10 mL at 10/15/22 1001    sodium chloride flush 0.9 % injection 5-40 mL, 5-40 mL, IntraVENous, PRN, Nila Malin MD    0.9 % sodium chloride infusion, , IntraVENous, PRN, Nila Malin MD    ondansetron (ZOFRAN-ODT) disintegrating tablet 4 mg, 4 mg, Oral, Q8H PRN **OR** ondansetron (ZOFRAN) injection 4 mg, 4 mg, IntraVENous, normal  Pupillary exam as below, see CN exam in the neurologic exam  ENT-    No scars, masses, or lesions over external nose or ears  Oropharynx without erythema, palate midline  Cardiovascular -   No clubbing, cyanosis, or edema   Pulmonary-   Good expansion, normal effort without use of accessory muscles  Musculoskeletal -   No significant wasting of muscles noted  Gait as below, see gait exam in the neurologic exam  Muscle strength, tone, stability as below see the motor exam in the neurologic exam.   No bony deformities  Skin -   Warm, dry, and intact to inspection and palpation. No rash, erythema, or pallor  Psychiatric -   Mood, affect, and behavior appear normal    Memory as below see mental status examination in the neurologic exam      NEUROLOGICAL EXAM    Mental status   [x] Awake, alert, oriented   [x] Affect attention and concentration appear appropriate  [x] Recent and remote memory appears unremarkable  [x] Speech normal without dysarthria or aphasia, comprehension and repetition intact.    COMMENTS:   Cranial Nerves [x] No VF deficit to confrontation,  optic discs normal, no papilledema on fundoscopic exam.  [x] PERRLA, EOMI, no nystagmus, conjugate eye movements, no ptosis  [x] Face symmetric  [x] Facial sensation intact  [x] Tongue midline no atrophy or fasciculations present  [x] Palate midline, hearing to finger rub normal  [x] Shoulder shrug and SCM testing normal  COMMENTS:   Motor   [x] 5/5 strength x 4 extremities  [x] Normal bulk and tone  [x] No tremor present  [x] No rigidity or bradykinesia noted  COMMENTS:   Sensory  [x] Sensation intact to light touch, pin prick, vibration, and proprioception BLE  [] Sensation intact to light touch, pin prick, vibration, and proprioception BUE  COMMENTS:   Coordination [x] FTN normal bilaterally   [] HTS normal bilaterally  [] JAYLEN normal.   COMMENTS:   Reflexes  [x] Symmetric and non-pathological  [x] Toes downgoing bilaterally  [x] No clonus present  COMMENTS:   Gait                  [x] Normal steady gait    [] Ataxic    [] Spastic     [] Magnetic     [] Shuffling  [] Not assessed  COMMENTS:       LABS/IMAGING:    As below and per HPI    Recent Labs     10/15/22  0128   WBC 6.1   HGB 11.0*        Recent Labs     10/15/22  0128      K 3.8      CO2 24   BUN 20   CREATININE 1.0*   GLUCOSE 115*     Recent Labs     10/15/22  0128   AST 13   ALT 10   BILITOT 0.5   ALKPHOS 67     Recent Labs     10/15/22  0446   CHOL 173   HDL 42*     Recent Labs     10/15/22  0128   INR 0.96         ASSESSMENT:  [de-identified] y.o. admitted with atypical paresthesias / tightness involving the throat, upper chest, possible extremity weakness, outside head CT with possible acute stroke. Elevated troponin, blood pressure. Increased anxiety. PLAN:   MRI Brain to clarify CT findings. MRI C-spine    CD, ECHO, Tele   Antiplatelet therapy, statin, cautious blood pressure titration    Cardiology consulted    PT, OT, ST   DVT proph      Please feel free to call with any questions. [phone] (cell phone).     Jaden Friday,   Board Certified Neurology

## 2022-10-15 NOTE — PROGRESS NOTES
Speech Language Pathology  Facility/Department: [facility] PROGRESSIVE CARE  Initial Speech/Language/Cognitive/Swallow Assessment    NAME: Levy Cool  : 1940   MRN: [id number]  ADMISSION DATE: 10/15/2022  ADMITTING DIAGNOSIS: has Esophageal dysphagia; Chronic GERD; History of esophageal stricture; History of adenomatous polyp of colon; Non-intractable vomiting with nausea; Midepigastric pain; Generalized weakness; History of gastric ulcer; S/P dilatation of esophageal stricture; Elevated lipase; Duodenal stricture; Duodenitis; Esophageal stricture; DDD (degenerative disc disease), lumbar; Hypercholesterolemia; DM (diabetes mellitus), type 2 ([facility] [address] History of MI (myocardial infarction); Chest pain; and Left-sided cerebrovascular accident (CVA) [facility]) on their problem list.    Date of Eval: 10/15/2022   Evaluating Therapist: Mojgan Garcia SLP    Assessment:  Completed assessment. Patient exhibited mildly delayed, but appropriate, auditory comprehension and structured responsive speech. Also evaluated patient's swallowing function. Patient exhibited sluggish, inconsistently mildly decreased laryngeal elevation for swallow airway protection. Even so, no outward S/S penetration/aspiration was noted with any ice chip trial, puree consistency trial, regular solid consistency trial, or thin H2O trial presented during assessment this date. At this time, would trial thin liquids with regular solid consistency (extra sauces/gravy/condiments). Do recommend large medications to be cut in half (patient reported sticking sensation with larger pills). Will continue to follow. Thank you for this consult. Goals:  Short-term Goals  Timeframe for Short-term Goals: 1-2x/day for 3 days  Goal 1: Patient will tolerate regular solid consistency and thin liquids with min S/S penetration/aspiration during PO intake.   Goal 2: Patient staff will follow swallow safety recommendations to decrease risk of penetration/aspiration during PO intake. Goal 3: Monitor qnoyle-zeyfrupf-lmzlmmuca functioning. Subjective:  Chart Reviewed: Yes  Patient assessed for rehabilitation services?: Yes  Family / Caregiver Present: No     Objective:  Oral Motor:   Labial ROM: Adequate during labial retraction trials and labial protrusion trials. Labial Strength: Adequate during labial compression trials. Labial Coordination: Adequate movements were noted. Lingual ROM: Adequate during lingual extension trials with full point achieved; decreased during lingual elevation trials without use of accessory jaw movement; adequate movements noted bilaterally. Lingual Strength: Adequate  Lingual Coordination: Adequate movements were noted. Auditory Comprehension  Comprehension:  (Patient demonstrated ability to answer simple yes/no questions regarding immediate environment and current state of being at independent level and with adequate processing speed. Patient demonstrated ability to follow simple 1 step commands independently and with adequate processing speed. While mild delays were noted, patient demonstrated ability to answer yes/no questions of increased complexity and to follow complex 1 and simple 2 step commands at independent level.)     Expression  Primary Mode of Expression:  (Confrontation naming of items in room was considered to be [facility]. Structured responsive speech was considered to be mildly delayed.  Responses in natural conversation were considered to be timely and appropriate.)     Motor Speech:  (SLP ranked functional intelligibility of speech for unfamiliar listeners at 100% in utterances with background noise present.)     Overall Orientation Status:  (Patient demonstrated ability to verbalize name, birthday, age, address, phone number, city, state, hospital, month, ISAK, and year at independent level.)     Attention:  (Patient demonstrated appropriate select and sustained attention during assessment.) Memory:  (Patient demonstrated appropriate immediate memory with sequences of unrelated numbers/words set up to 5 items without repetitions provided. Patient demonstrated appropriate short-term memory with 10 minute delay+distractions present.)     Problem Solving:  (It is noted that during evaluation, patient demonstrated ability to verbalize current PCP and pharmacy at independent level. Patient demonstrated ability to verbalize conditions in which she takes medications independently. Patient demonstrated ability to verbalize appropriate simple solutions to situations that could occur during activities of daily living at independent level.)     Additional Assessment:   Assessed patient's swallowing function. Oral transit of ice chip trials, presented by SLP, primarily measured 1-2 seconds in length. Oral transit of puree consistency trials, presented independently, primarily measured 1-2 seconds in length and no oral cavity residue was noted post swallows. With regular solid consistency trials presented independently, patient exhibited adequate oral prep. Oral transit of regular solid consistency primarily measured 1-2 seconds in length and min oral cavity residue was observed post swallows; residue cleared from the mouth with additional dry swallows. Oral transit of thin H2O trials, presented independently via cup, primarily measured 1 second in length. Laryngeal elevation during swallow initiation was considered to be sluggish and inconsistently mildly decreased for swallow airway protection. Even so, no outward S/S penetration/aspiration was noted with any ice chip trial, puree consistency trial, regular solid consistency trial, or thin H2O trial presented during assessment this date. At this time, would trial thin liquids with regular solid consistency (extra sauces/gravy/condiments). Do recommend large medications to be cut in half (patient reported sticking sensation with larger pills).  Will continue to follow.     Electronically signed by ERLIN Michelle on 10/15/2022 at 1:07 PM no

## 2022-10-15 NOTE — PLAN OF CARE
Problem: Discharge Planning  Goal: Discharge to home or other facility with appropriate resources  Outcome: Progressing  Flowsheets (Taken 10/15/2022 0113)  Discharge to home or other facility with appropriate resources:   Identify barriers to discharge with patient and caregiver   Identify discharge learning needs (meds, wound care, etc)   Refer to discharge planning if patient needs post-hospital services based on physician order or complex needs related to functional status, cognitive ability or social support system   Arrange for needed discharge resources and transportation as appropriate     Problem: Safety - Adult  Goal: Free from fall injury  Outcome: Progressing

## 2022-10-15 NOTE — H&P
[facility] History and Physical    Patient Information:  Patient: Norma Deleon  MRN: [id number]   Acct: [de-identified]  YOB: 1940  Admit Date: 10/15/2022      Date of Service: 10/15/2022  Primary Care Physician: Jaquan Turner DO  Advance Directive: Full Code  Health Care Proxy: her , Mr. Deonte Cantu, [phone]        SUBJECTIVE:    Informed of NSTEMI / CVA     Transfer Center Summary: (copied out of their note, due to computer system problems my original documentation was lost)  \"Dr. Jonathan Adan called [facility] to see if a higher level care bed is available. Unable to make ICU bed. If the accepting provider is a mid-level must document attending physician's name   Providers connected (names with credentials):  Dr. Aayush Whiting NP  Throat tightness  Upper ext weakness  Acute MI June 2021  Stent placed at [address] [place] [address] elevated. 2nd trop decreasing  EKG reviewed  CT Head- Possible acute infarct   History reviewed   No cardiology or Neurology   No COVID concerns  Vitals reviewed- BP elevated on arrival  Reason for transfer:   SNS- No cardiology or Neurology  Telephone Order Read Back:  Accepting provider: Jonathan Adan  DX:CVA and elevated trop   Patient class (IP/OBS):inp  Length of Stay (Days):Not discussed   Level of Care (Type of bed):PCU   Code Status:   [x] Full Code  [] [facility]  [] DNRCC-A  Does the patient have confirmed or suspected COVID-19? Dr. Jonathan Adan Requests COVID swab\"    HPI:  Mrs. Jarred Whitman is a pleasant [de-identified]year old  [place] lady from [facility]. She states that she had a pressure and pain sensation in her neck Wednesday that had woken her up, this lasted 5 minutes on Wednesday night. Thursday night this reoccurred and lasted about 5 minutes as well. She states that Thursday night she decided to try her  O2 sensor and it told her she was 97-99%. The feeling as gone shortly after so she went back to bed.  She states that she she under a lot of stress as her  has COPD and heart problems. She states that he does not smoke now but that he used to and he had smoked around her. She states that he no longer smokes in the house. Review of Systems:   Review of Systems   Constitutional:  Positive for fatigue. Negative for chills, diaphoresis and fever. Cardiovascular:  Negative for chest pain, palpitations and leg swelling. No chest pressure (states she did a year ago but with a MI and not since)   Gastrointestinal:  Positive for nausea. Negative for abdominal pain, diarrhea and vomiting (not in last 3 days though). Genitourinary:  Negative for dysuria, frequency and urgency. Musculoskeletal:  Positive for neck pain. HAD neck pressure   Neurological:  Positive for weakness (worsening). Psychiatric/Behavioral:  Negative for confusion. Past Medical History:   Diagnosis Date    Fernandes's esophagus     DDD (degenerative disc disease), lumbar     DM (diabetes mellitus), type 2 ([facility] [address]     Duodenal ulcer     E. coli sepsis ([facility] [address] 02/2014    Elevated pancreatic enzyme 2017    Gastritis     GERD (gastroesophageal reflux disease)     Hiatal hernia     History of colon polyps     HTN (hypertension)     Hypercholesterolemia     Hypothyroidism     Osteoarthritis     other     Non Specified Ulcers    Pernicious anemia     Skin cancer     History OF. Venous angioma     left-parietal     Past Psychiatric History:  Denied any    Past Surgical History:   Procedure Laterality Date    BREAST SURGERY      BG Biopsy    CHOLECYSTECTOMY, OPEN      COLONOSCOPY  03/16/2005    DR. OTERO    COLONOSCOPY  05/21/2010    tubular adenoma    COLONOSCOPY  12/31/2015    Dr Rebekah Padilla, normal, 5 yr recall    FRACTURE SURGERY      2002 performed and 2003 pins removed, then had a replacement in 2018    HAND SURGERY Right 07/2012    Dr Sriram Babcock removed due to arthritis and tendon repair    HYSTERECTOMY, TOTAL ABDOMINAL (CERVIX REMOVED)  [address] SURGERY  11/2008    L5-S1-Dr Patricia Schaeffer    LUMBAR SPINE SURGERY  2005    OTHER SURGICAL HISTORY Right     Basa Cell Lesion Removal-elbow    OTHER SURGICAL HISTORY Right 2002    ORIF ankle    DE EGD INTRMURAL NEEDLE ASPIR/BIOP ALTERED ANATOMY N/A 08/15/2018    Dr Napoleon Kingw EGD TRANSORAL BIOPSY SINGLE/MULTIPLE N/A 12/06/2017    EGD BIOPSY performed by Yemi Avila DO at [address] [facility] Right     ROTATOR CUFF REPAIR Left 12/2013    Dr Daniel Cabrera  05/19/2005    DR. KIM    UPPER GASTROINTESTINAL ENDOSCOPY  10/22/2009    peptic stricture, schatzki ring dilated 46 fr, sm hiatal hernia, gastritis    UPPER GASTROINTESTINAL ENDOSCOPY  10/01/2014    Dr Hauser-empiric dilation, urease neg    UPPER GASTROINTESTINAL ENDOSCOPY N/A 12/18/2015    Dr Cheng Speaker, stricture/ulcers, repeat 8 wks    UPPER GASTROINTESTINAL ENDOSCOPY  12/06/2017    Dr Hauser-w/dilation over wire, [address]-hiatal hernia, distal esophageal stricture    UPPER GASTROINTESTINAL ENDOSCOPY  08/15/2018    Dr SANYA Finley-w/dilation over wire-[address] and EUS-Possible duodenitis, gastritis, duodenal stricture likely peptic stricture nature, hiatal hernia    UPPER GASTROINTESTINAL ENDOSCOPY  08/15/2018    Dr SANYA Finley-w/dilation over wire-[address] and EUS-Possible duodenitis, gastritis, duodenal stricture likely peptic stricture nature, hiatal hernia     Social History       Tobacco History       Smoking Status  Former Quit Date  9/16/1989 Smoking Frequency  0.50 packs/day for 10.00 years (5.00 pk-yrs) Smoking Tobacco Type  Cigarettes quit in 9/16/1989      Smokeless Tobacco Use  Never              Alcohol History       Alcohol Use Status  Yes Comment  glass of wine about once per month              Drug Use       Drug Use Status  Never              Sexual Activity       Sexually Active  Not Asked Comment  has a son and a daughter CODE STATUS: DNR  HEALTH CARE PROXY: her , Mr. Mendez Husbands, [phone]  AMBULATES: independently  DOMICILED: has 2 steps to enter the home, has no stairs inside the home, lives with her  and their daughter, has a dog     Family History   Problem Relation Age of Onset    Breast Cancer Mother     Blindness Son         injured in a [employer] fight, he was a guard    Kidney stones Son     No Known Problems Daughter     Colon Cancer Neg Hx     Colon Polyps Neg Hx     Esophageal Cancer Neg Hx     Liver Disease Neg Hx     Liver Cancer Neg Hx     Rectal Cancer Neg Hx     Stomach Cancer Neg Hx      Allergies   Allergen Reactions    Perflutren Lipid Microsphere Other (See Comments)     Severe back pain    Januvia [Sitagliptin] Nausea And Vomiting    Vancomycin Itching     itchy scalp     Home Medications:  Prior to Admission medications    Medication Sig Start Date End Date Taking? Authorizing Provider   ezetimibe (ZETIA) 10 MG tablet Take 10 mg by mouth daily  Patient not taking: Reported on 10/15/2022    Historical Provider, MD   vitamin D (ERGOCALCIFEROL) 1.25 MG (75483 UT) CAPS capsule Take 50,000 Units by mouth once a week    Historical Provider, MD   pantoprazole (PROTONIX) 40 MG tablet Take 40 mg by mouth daily    Historical Provider, MD   aspirin EC 81 MG EC tablet Take 1 tablet by mouth daily 7/27/21   Samia Morrell MD   clopidogrel (PLAVIX) 75 MG tablet Take 1 tablet by mouth daily  Patient not taking: Reported on 10/15/2022 7/27/21   Samia Morrell MD   carvedilol (COREG) 3.125 MG tablet Take 1 tablet by mouth 2 times daily (with meals)  Patient taking differently: Take 3.125 mg by mouth daily 7/27/21   Samia Morrell MD   insulin glargine (LANTUS) 100 UNIT/ML injection vial Inject 16 Units into the skin nightly    Historical Provider, MD   levothyroxine (SYNTHROID) 75 MCG tablet Take 75 mcg by mouth Daily.     Historical Provider, MD   MetFORMIN HCl (GLUCOPHAGE PO) Take 1,000 mg by mouth daily     Historical Provider, MD   LISINOPRIL PO Take 10 mg by mouth daily. Historical Provider, MD         OBJECTIVE:    Vitals:    10/15/22 0024   BP: (!) 160/75   Pulse: 62   Resp: 18   Temp: (!) 96.6 °F (35.9 °C)   SpO2: 99%   Breathing room air    BP (!) 160/75   Pulse 62   Temp (!) 96.6 °F (35.9 °C) (Temporal)   Resp 18   Ht 5' 3.5\" (1.613 m)   Wt 161 lb 9.6 oz (73.3 kg)   SpO2 99%   BMI 28.18 kg/m²     No intake or output data in the 24 hours ending 10/15/22 0223    Physical Exam  Vitals reviewed. Constitutional:       General: She is not in acute distress. Appearance: Normal appearance. She is normal weight. She is not ill-appearing or toxic-appearing. HENT:      Head: Normocephalic and atraumatic. Nose: No congestion or rhinorrhea. Eyes:      General:         Right eye: No discharge. Left eye: No discharge. Neck:      Comments: Supple, trachea appears midline  Cardiovascular:      Rate and Rhythm: Normal rate and regular rhythm. Heart sounds: No murmur heard. No friction rub. No gallop. Pulmonary:      Effort: No respiratory distress. Breath sounds: No stridor. No wheezing, rhonchi or rales. Chest:      Chest wall: No tenderness. Abdominal:      General: Bowel sounds are normal.      Tenderness: There is no abdominal tenderness. There is no guarding or rebound. Musculoskeletal:         General: No tenderness or signs of injury. Right lower leg: No edema. Left lower leg: No edema. Skin:     General: Skin is warm. Comments: nondiaphoretic   Neurological:      General: No focal deficit present. Mental Status: She is alert and oriented to person, place, and time. GCS: GCS eye subscore is 4. GCS verbal subscore is 5. GCS motor subscore is 6. Cranial Nerves: No dysarthria or facial asymmetry. Motor: No weakness, tremor or seizure activity.    Psychiatric:         Mood and Affect: Mood normal.         Behavior: Behavior normal.        LABORATORY DATA:    CBC:   Recent Labs     10/15/22  0128   WBC 6.1   HGB 11.0*   HCT 34.9*        BMP: No results for input(s): NA, K, CL, CO2, BUN, CREATININE, CALCIUM, PHOS in the last 72 hours. Invalid input(s): MAGNES  Hepatic Profile: No results for input(s): AST, ALT, BILIDIR, BILITOT, ALKPHOS in the last 72 hours. Coag Panel:   Recent Labs     10/15/22  0128   INR 0.96   PROTIME 12.6     Cardiac Enzymes:   Recent Labs     10/15/22  0128   TROPONINI 0.07*     Pro-BNP:   Recent Labs     10/15/22  0128   PROBNP 1,692     A1C: No results for input(s): LABA1C in the last 72 hours. TSH: Invalid input(s): LABTSH  ABG: No results for input(s): PHART, FSQ8UVA, PO2ART, RYA8FHD, BEART, HGBAE, C0UKUAXQ, CARBOXHGBART in the last 72 hours. Urinalysis:   Lab Results   Component Value Date/Time    NITRU Negative 05/11/2022 09:27 AM    WBCUA 5 05/11/2022 09:27 AM    BACTERIA NEGATIVE 05/11/2022 09:27 AM    RBCUA 1 05/11/2022 09:27 AM    BLOODU Negative 05/11/2022 09:27 AM    SPECGRAV 1.013 05/11/2022 09:27 AM    GLUCOSEU Negative 05/11/2022 09:27 AM       EKG:   Sinus Gold cardia, No ST changes, QTc WL    IMAGING:  No results found.         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:  admit under hospitalist team  consult neuro in AM  PT/OT/SpeechTherapy as per protocol  NPO until/unless passes RN swallow screen, then clears diet withOUT caffeine  HbA1c, TSH, Lipid Panel all to be added on  Fall Precuations  Bed Alarm  ASA and Statin as per protocol  Defer on MRI to Neuro  Labetalol PRN for now, will allow permissive HTN  TTE in AM to look for thromboembolic causes  Carotid US to look for stenotic disease is indicated as CTA was not done    NSTEMI versus Troponinemia secondary to severe hypertension:  Place in the Cardiac Rehman  Tele  Cardio consult in AM  Trend TnI  Mag, Phos, TSH, Lipid Panel, HbA1c - will run as add ons to ED labs if able  CBC and BMP with Reflex for following AM  ASA & Statin as pre CVA protocol as it is the more aggressive of the two  NG SL PRN CP  TTE in AM    Chronic Medical Problems:  Continue current Regimen as indicated   insulin glargine  16 Units SubCUTAneous Nightly    levothyroxine  75 mcg Oral Daily    pantoprazole  40 mg Oral Daily   ISS low dose in place of metformin  POCT scheduled and PRN  Hypoglycemics orders set    Supoportive and Prophylactic Txx:  DVT Prophylaxis: Lovenox SQ  GI (PUD) Ppx: nor indicated as such  PT consult for evalutation and Txx as indicated: will monitor but for now anticipate no need to keep strict bed rest later  Diet NPO  sodium chloride flush, sodium chloride, ondansetron **OR** ondansetron, acetaminophen **OR** acetaminophen, labetalol, melatonin, calcium carbonate, bisacodyl, nitroGLYCERIN      Care time of >55 minutes  Pt seen/examined and admitted to impatient status. Inpatient status is used for patients with an expected LOS extending past two midnights due to medical therapy and or critical care needs, otherwise patients are placed to OBServation status. Signed:  Electronically signed by Armaan Gutierrez MD on 10/15/22 at 02:45 AM CDT.

## 2022-10-15 NOTE — CONSULTS
[facility]) Cardiology   Consult Note       Requesting MD:  Savanna Ngo MD   Admit Status:         Patient:    Norma Deleon  [id number]  1940         Chief Complaint: Neck pain and weakness  HPI: Patient is a [de-identified] y.o. female has been having on and off weakness of both's and upper chest associated with neck pain for 3 nights. She was admitted for investigation for possible CVA. Troponin was elevated and flat. EKG shows sinus rhythm with nonspecific ST-T wave changes in inferior leads unchanged from May. In May patient was admitted for atypical chest pain. Because of falsely positive nuclear stress test, cardiac catheterization was performed and showed widely patent mid right coronary artery stent. Patient suffered acute inferior myocardial infarct July 2021. In April echocardiogram showed normal wall motion. Patient is known to have hypertension diabetes and hyperlipidemia. He is a non-smoker and drinks occasionally. Since May patient has been reasonably active.   Since admission patient has been worked up by neurologist    Review of Systems:  HENNT: normal  PULMONARY: normal   CVS:see history of present illness  ABD: denies any abdominal pain  PERIPHERL: normal  MSK: Right ankle surgery   CNS: Denies any dizziness, syncopal episode or history of stroke  Renal: Denies any history of dysuria or frequency    Cardiac Specific Data:  Specialty Problems          Cardiology Problems    Hypercholesterolemia        Chest pain        * (Principal) Left-sided cerebrovascular accident (CVA) ([address]           Past Medical History:  Past Medical History:   Diagnosis Date    Fernandes's esophagus     DDD (degenerative disc disease), lumbar     DM (diabetes mellitus), type 2 ([address]     Duodenal ulcer     E. coli sepsis ([address] 02/2014    Elevated pancreatic enzyme 2017    Gastritis     GERD (gastroesophageal reflux disease)     Hiatal hernia     History of colon polyps     HTN (hypertension)     Hypercholesterolemia Hypothyroidism     Osteoarthritis     other     Non Specified Ulcers    Pernicious anemia     Skin cancer     History OF. Venous angioma     left-parietal        Past Surgical History:  Past Surgical History:   Procedure Laterality Date    BREAST SURGERY      BG Biopsy    CHOLECYSTECTOMY, OPEN      COLONOSCOPY  03/16/2005    DR. OTERO    COLONOSCOPY  05/21/2010    tubular adenoma    COLONOSCOPY  12/31/2015    Dr Philipp Rao, normal, 5 yr recall    FRACTURE SURGERY      2002 performed and 2003 pins removed, then had a replacement in 2018    HAND SURGERY Right 07/2012    Dr Elbert Tolbert removed due to arthritis and tendon repair    HYSTERECTOMY, TOTAL ABDOMINAL (CERVIX REMOVED)  [facility]  11/2008    L5-S1- [address]   2005    OTHER SURGICAL HISTORY Right     Basa Cell Lesion Removal-elbow    OTHER SURGICAL HISTORY Right 2002    ORIF ankle    MI EGD INTRMURAL NEEDLE ASPIR/BIOP ALTERED ANATOMY N/A 08/15/2018    Dr George Still EGD TRANSORAL BIOPSY SINGLE/MULTIPLE N/A 12/06/2017    EGD BIOPSY performed by Alan Olivia DO at [facility] Right     ROTATOR CUFF REPAIR Left 12/2013    Dr Gray Ply  05/19/2005    DR. KIM    UPPER GASTROINTESTINAL ENDOSCOPY  10/22/2009    peptic stricture, schatzki ring dilated 46 fr, sm hiatal hernia, gastritis    UPPER GASTROINTESTINAL ENDOSCOPY  10/01/2014    Dr Hauser-empiric dilation, urease neg    UPPER GASTROINTESTINAL ENDOSCOPY N/A 12/18/2015    Dr Philipp Rao, stricture/ulcers, repeat 8 wks    UPPER GASTROINTESTINAL ENDOSCOPY  12/06/2017    Dr Hauser-w/dilation over wire, [address]-hiatal hernia, distal esophageal stricture    UPPER GASTROINTESTINAL ENDOSCOPY  08/15/2018    Dr SANYA Finley-w/dilation over wire-[address] and EUS-Possible duodenitis, gastritis, duodenal stricture likely peptic stricture nature, hiatal hernia UPPER GASTROINTESTINAL ENDOSCOPY  08/15/2018    Dr SANYA Finley-w/dilation over wire-[address] and EUS-Possible duodenitis, gastritis, duodenal stricture likely peptic stricture nature, hiatal hernia       Past Family History:  Family History   Problem Relation Age of Onset    Breast Cancer Mother     Blindness Son         injured in a detention fight, he was a guard    Kidney stones Son     No Known Problems Daughter     Colon Cancer Neg Hx     Colon Polyps Neg Hx     Esophageal Cancer Neg Hx     Liver Disease Neg Hx     Liver Cancer Neg Hx     Rectal Cancer Neg Hx     Stomach Cancer Neg Hx        Past Social History:  Social History     Socioeconomic History    Marital status:      Spouse name: Mr. Chanel Davis    Number of children: 2    Years of education: Not on file    Highest education level: Not on file   Occupational History    Occupation: [employer], Book keeper,      Comment: Retired   Tobacco Use    Smoking status: Former     Packs/day: 0.50     Years: 10.00     Pack years: 5.00     Types: Cigarettes     Quit date: 1989     Years since quittin.1    Smokeless tobacco: Never   Vaping Use    Vaping Use: Never used   Substance and Sexual Activity    Alcohol use: Yes     Comment: glass of wine about once per month    Drug use: Never    Sexual activity: Not on file     Comment: has a son and a daughter   Other Topics Concern    Not on file   Social History Narrative    CODE STATUS: DNR    HEALTH CARE PROXY: her , Mr. Chanel Davis, [phone]    AMBULATES: independently    DOMICILED: has 2 steps to enter the home, has no stairs inside the home, lives with her  and their daughter, has a dog     Social Determinants of Health     Financial Resource Strain: Not on file   Food Insecurity: Not on file   Transportation Needs: Not on file   Physical Activity: Not on file   Stress: Not on file   Social Connections: Not on file   Intimate Partner Violence: Not on file Housing Stability: Not on file       Allergies: Allergies   Allergen Reactions    Perflutren Lipid Microsphere Other (See Comments)     Severe back pain    Januvia [Sitagliptin] Nausea And Vomiting    Vancomycin Itching     itchy scalp       Prior to Admission medications    Medication Sig Start Date End Date Taking? Authorizing Provider   ezetimibe (ZETIA) 10 MG tablet Take 10 mg by mouth daily  Patient not taking: Reported on 10/15/2022    Historical Provider, MD   vitamin D (ERGOCALCIFEROL) 1.25 MG (76342 UT) CAPS capsule Take 50,000 Units by mouth once a week    Historical Provider, MD   pantoprazole (PROTONIX) 40 MG tablet Take 40 mg by mouth daily    Historical Provider, MD   aspirin EC 81 MG EC tablet Take 1 tablet by mouth daily 7/27/21   Joes Lebron MD   clopidogrel (PLAVIX) 75 MG tablet Take 1 tablet by mouth daily  Patient not taking: Reported on 10/15/2022 7/27/21   Jose Lebron MD   carvedilol (COREG) 3.125 MG tablet Take 1 tablet by mouth 2 times daily (with meals)  Patient taking differently: Take 3.125 mg by mouth daily 7/27/21   Jose Lebron MD   insulin glargine (LANTUS) 100 UNIT/ML injection vial Inject 16 Units into the skin nightly    Historical Provider, MD   levothyroxine (SYNTHROID) 75 MCG tablet Take 75 mcg by mouth Daily. Historical Provider, MD   MetFORMIN HCl (GLUCOPHAGE PO) Take 1,000 mg by mouth daily     Historical Provider, MD   LISINOPRIL PO Take 10 mg by mouth daily.     Historical Provider, MD        Current Facility-Administered Medications   Medication Dose Route Frequency Provider Last Rate Last Admin    enoxaparin (LOVENOX) injection 40 mg  40 mg SubCUTAneous Daily Paty Randall MD        aspirin EC tablet 81 mg  81 mg Oral Daily Paty Randall MD        Or    aspirin suppository 300 mg  300 mg Rectal Daily Paty Randall MD        sodium chloride flush 0.9 % injection 5-40 mL  5-40 mL IntraVENous 2 times per day Paty Randall MD   10 mL at 10/15/22 1001    sodium chloride flush 0.9 % injection 5-40 mL  5-40 mL IntraVENous PRN Michele Conrad MD        0.9 % sodium chloride infusion   IntraVENous PRN Michele Conrad MD        ondansetron (ZOFRAN-ODT) disintegrating tablet 4 mg  4 mg Oral Q8H PRN Michele Conrad MD        Or    ondansetron [facility]) injection 4 mg  4 mg IntraVENous Q6H PRN Michele Conrad MD        acetaminophen (TYLENOL) tablet 650 mg  650 mg Oral Q6H PRN Michele Conrad MD        Or    acetaminophen (TYLENOL) suppository 650 mg  650 mg Rectal Q6H PRN Michele Conrad MD        atorvastatin (LIPITOR) tablet 80 mg  80 mg Oral Nightly Michele Conrad MD        labetalol (NORMODYNE;TRANDATE) injection 10 mg  10 mg IntraVENous Q10 Min PRN Michele Conrad MD        melatonin disintegrating tablet 5 mg  5 mg Oral Nightly PRN Mihcele Conrad MD        calcium carbonate (TUMS) chewable tablet 500 mg  500 mg Oral TID PRN Michele Conrad MD        bisacodyl (DULCOLAX) suppository 10 mg  10 mg Rectal Daily PRELEUTERIO Conrad MD        nitroGLYCERIN (NITROSTAT) SL tablet 0.4 mg  0.4 mg SubLINGual Q5 Min PRELEUTERIO Conrad MD        insulin glargine (LANTUS) injection vial 16 Units  16 Units SubCUTAneous Nightly Michele Conrad MD        levothyroxine (SYNTHROID) tablet 75 mcg  75 mcg Oral Daily Michele Conrad MD   75 mcg at 10/15/22 0601    pantoprazole (PROTONIX) tablet 40 mg  40 mg Oral Daily Michele Conrad MD   40 mg at 10/15/22 1000    insulin lispro (HUMALOG) injection vial 0-4 Units  0-4 Units SubCUTAneous TID WC Michele Conrad MD        insulin lispro (HUMALOG) injection vial 0-4 Units  0-4 Units SubCUTAneous Nightly Michele Conrad MD        glucose chewable tablet 16 g  4 tablet Oral PRN Michele Conrad MD        dextrose bolus 10% 125 mL  125 mL IntraVENous PRN Michele Conrad MD        Or    dextrose bolus 10% 250 mL  250 mL IntraVENous PRN Michele Conrad MD        glucagon (rDNA) injection 1 mg  1 mg SubCUTAneous PRN Michlee Conrad MD        dextrose 10 % infusion   IntraVENous Continuous PRN Halley Parikh MD            Current Infused Meds:   sodium chloride      dextrose         Physical Exam:  Vitals:    10/15/22 1442   BP: (!) 145/72   Pulse: 62   Resp: 18   Temp: 97.7 °F (36.5 °C)   SpO2: 97%       Intake/Output Summary (Last 24 hours) at 10/15/2022 1611  Last data filed at 10/15/2022 1103  Gross per 24 hour   Intake 490 ml   Output --   Net 490 ml     Estimated body mass index is 28.18 kg/m² as calculated from the following:    Height as of this encounter: 5' 3.5\" (1.613 m). Weight as of this encounter: 161 lb 9.6 oz (73.3 kg). PHYSICAL EXAM:  HENNT: normal  PULMONARY: normal to inspection, palpation, percussion and ascultation  CVS:Normal neck vein, S1 and S2 normal, no murmur  ABD: liver and spleen not palpable, nontender, bowel sound normal  PERIPHERL: all pulses are normal with no bruit  MSK: no swelling of the lower extremities  CNS: Normal CN 2,3,4,6,5,7,9,10,11,and 12. Oriented to time, place and person    Labs:  Recent Labs     10/15/22  0128   WBC 6.1   HGB 11.0*          Recent Labs     10/15/22  0128      K 3.8      CO2 24   BUN 20   CREATININE 1.0*   CALCIUM 9.1   PHOS 2.8       CK, CKMB, Troponin:   Recent Labs     10/15/22  0128 10/15/22  0446 10/15/22  0706   TROPONINI 0.07* 0.08* 0.07*       Last 3 BNP:  Recent Labs     10/15/22  0128   PROBNP 1,692             MRI BRAIN W WO CONTRAST    Result Date: 10/15/2022   1. Chronic microvascular ischemic changes with age-appropriate parenchymal volume loss. 2. No evidence of acute cortical infarction or intracranial hemorrhage. 3. No abnormal contrast enhancement is detected. Recommendation: Follow up as clinically indicated.  Electronically Signed by Glen Kerns MD at 15-Oct-2022 03:49:38 PM                Assessment and Plan:  Weakness of the neck and upperarms, etiology unclear  Mild neck pain with mildly elevated troponin and is flat  EKG unchanged  Normal cardiac catheterization 5 months ago with patent mid right coronary artery stent  Hypertension, diabetes and hyperlipidemia    Plan: I doubt very much that this is due to cardiac ischemia. We will proceed with echocardiogram    I have spent 60 minutes reviewing the chart, taking history, examining the patient, discussion with the patient and the family concerning the finding, diagnoses and treatment plan. This dictation was performed using [address] [employer]. Mistake and misspelling may have been created without  realizing them. Please notify me for clarification.   Thank you    Jamaal Regalado MD   10/15/2022, 4:11 PM

## 2022-10-15 NOTE — PLAN OF CARE
Problem: Discharge Planning  Goal: Discharge to home or other facility with appropriate resources  10/15/2022 1753 by Mary Ang RN  Outcome: Progressing  Flowsheets (Taken 10/15/2022 1004)  Discharge to home or other facility with appropriate resources:   Identify barriers to discharge with patient and caregiver   Arrange for needed discharge resources and transportation as appropriate  10/15/2022 0419 by Jena Mccrary RN  Outcome: Progressing  Flowsheets (Taken 10/15/2022 0113)  Discharge to home or other facility with appropriate resources:   Identify barriers to discharge with patient and caregiver   Identify discharge learning needs (meds, wound care, etc)   Refer to discharge planning if patient needs post-hospital services based on physician order or complex needs related to functional status, cognitive ability or social support system   Arrange for needed discharge resources and transportation as appropriate     Problem: Safety - Adult  Goal: Free from fall injury  10/15/2022 1753 by Mary Ang RN  Outcome: Progressing  10/15/2022 0419 by Jena Mccrary RN  Outcome: Progressing     Problem: Pain  Goal: Verbalizes/displays adequate comfort level or baseline comfort level  Outcome: Progressing     Problem: Neurosensory - Adult  Goal: Absence of seizures  Outcome: Progressing     Problem: Cardiovascular - Adult  Goal: Maintains optimal cardiac output and hemodynamic stability  Outcome: Progressing  Goal: Absence of cardiac dysrhythmias or at baseline  Outcome: Progressing     Problem: Skin/Tissue Integrity - Adult  Goal: Skin integrity remains intact  Outcome: Progressing  Goal: Oral mucous membranes remain intact  Outcome: Progressing     Problem: Musculoskeletal - Adult  Goal: Return mobility to safest level of function  Outcome: Progressing  Goal: Return ADL status to a safe level of function  Outcome: Progressing     Problem: Gastrointestinal - Adult  Goal: Maintains or returns to baseline bowel function  Outcome: Progressing  Goal: Maintains adequate nutritional intake  Outcome: Progressing     Problem: Genitourinary - Adult  Goal: Absence of urinary retention  Outcome: Progressing     Problem: Metabolic/Fluid and Electrolytes - Adult  Goal: Electrolytes maintained within normal limits  Outcome: Progressing  Goal: Hemodynamic stability and optimal renal function maintained  Outcome: Progressing     Problem: Hematologic - Adult  Goal: Maintains hematologic stability  Outcome: Progressing

## 2022-10-15 NOTE — PROGRESS NOTES
Physical Therapy  Pt observed up ad sal in room without difficulty and dressed herself into pj's. 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   Electronically signed by Magdi Osorio PT on 10/15/2022 at 2:37 PM

## 2022-10-16 VITALS
HEART RATE: 70 BPM | TEMPERATURE: 97.5 F | WEIGHT: 161.6 LBS | OXYGEN SATURATION: 98 % | BODY MASS INDEX: 27.59 KG/M2 | SYSTOLIC BLOOD PRESSURE: 149 MMHG | DIASTOLIC BLOOD PRESSURE: 78 MMHG | RESPIRATION RATE: 16 BRPM | HEIGHT: 64 IN

## 2022-10-16 LAB
APTT: 35 SEC (ref 26–36.2)
GLUCOSE BLD-MCNC: 106 MG/DL (ref 70–99)
GLUCOSE BLD-MCNC: 64 MG/DL (ref 70–99)
PERFORMED ON: ABNORMAL
PERFORMED ON: ABNORMAL

## 2022-10-16 PROCEDURE — 82947 ASSAY GLUCOSE BLOOD QUANT: CPT

## 2022-10-16 PROCEDURE — 6370000000 HC RX 637 (ALT 250 FOR IP): Performed by: HOSPITALIST

## 2022-10-16 PROCEDURE — 36415 COLL VENOUS BLD VENIPUNCTURE: CPT

## 2022-10-16 PROCEDURE — 85730 THROMBOPLASTIN TIME PARTIAL: CPT

## 2022-10-16 PROCEDURE — 6360000002 HC RX W HCPCS: Performed by: HOSPITALIST

## 2022-10-16 PROCEDURE — 96372 THER/PROPH/DIAG INJ SC/IM: CPT

## 2022-10-16 PROCEDURE — 99222 1ST HOSP IP/OBS MODERATE 55: CPT | Performed by: NEUROLOGICAL SURGERY

## 2022-10-16 PROCEDURE — G0378 HOSPITAL OBSERVATION PER HR: HCPCS

## 2022-10-16 PROCEDURE — 99232 SBSQ HOSP IP/OBS MODERATE 35: CPT | Performed by: PSYCHIATRY & NEUROLOGY

## 2022-10-16 PROCEDURE — 2580000003 HC RX 258: Performed by: HOSPITALIST

## 2022-10-16 RX ADMIN — LEVOTHYROXINE SODIUM 75 MCG: 75 TABLET ORAL at 07:34

## 2022-10-16 RX ADMIN — SODIUM CHLORIDE, PRESERVATIVE FREE 10 ML: 5 INJECTION INTRAVENOUS at 07:37

## 2022-10-16 RX ADMIN — ENOXAPARIN SODIUM 40 MG: 100 INJECTION SUBCUTANEOUS at 07:27

## 2022-10-16 RX ADMIN — PANTOPRAZOLE SODIUM 40 MG: 40 TABLET, DELAYED RELEASE ORAL at 07:27

## 2022-10-16 RX ADMIN — ASPIRIN 81 MG: 81 TABLET, COATED ORAL at 07:27

## 2022-10-16 ASSESSMENT — ENCOUNTER SYMPTOMS
RESPIRATORY NEGATIVE: 1
GASTROINTESTINAL NEGATIVE: 1
TROUBLE SWALLOWING: 1
EYES NEGATIVE: 1
ALLERGIC/IMMUNOLOGIC NEGATIVE: 1

## 2022-10-16 NOTE — CONSULTS
[facility] Neurosurgery Consultation        REASON FOR CONSULTATION:  Neck/throat pain, weakness, abnormal cervical spine MRI      HISTORY OF PRESENT ILLNESS:      The patient is a [de-identified] y.o. female who presented to [facility] on 10/15/2022 as a transfer from [facility] with concerns for a possible stroke. She states that for the past several days she has had episodes of pain, paresthesias and subjective weakness in her neck, throat and bilateral upper extremities. These episodes are transient and resolve after several minutes and she returns to her baseline. She states that in-between these episodes her neck does not hurt and she has no weakness. She does have a history of intermittent neck problems in the past for which she sees a chiropractor and this controls her symptoms fairly well. After admission, neurology was consulted regarding the concern for possible stroke on her outside CT and MRI scans of the brain and cervical spine were obtained. The radiology interpretation of the MRI suggested multiple levels of degenerative/spondylitic disease and I have been asked to provide neurosurgical consultation. On questioning, she currently denies neck pain, radiating arm pain, numbness or focal weakness. She denies any difficulty with fine motor function, coordination or weakness in her hands. She denies any difficulty with bowel or bladder control. She does endorse poor balance that she attributes to a history of peripheral neuropathy.        MEDICAL HISTORY:             Past Medical History:   Diagnosis Date    Fernandes's esophagus     DDD (degenerative disc disease), lumbar     DM (diabetes mellitus), type 2 ([address]     Duodenal ulcer     E. coli sepsis ([facility] [address] 02/2014    Elevated pancreatic enzyme 2017    Gastritis     GERD (gastroesophageal reflux disease)     Hiatal hernia     History of colon polyps     HTN (hypertension)     Hypercholesterolemia     Hypothyroidism     Osteoarthritis     other     Non Specified Ulcers    Pernicious anemia     Skin cancer     History OF. Venous angioma     left-parietal       Past Surgical History:   Procedure Laterality Date    BREAST SURGERY      BG Biopsy    CHOLECYSTECTOMY, OPEN      COLONOSCOPY  03/16/2005    DR. OTERO    COLONOSCOPY  05/21/2010    tubular adenoma    COLONOSCOPY  12/31/2015    Dr Jocy Veloz, normal, 5 yr recall    FRACTURE SURGERY      2002 performed and 2003 pins removed, then had a replacement in 2018    HAND SURGERY Right 07/2012    Dr Bass Bois Forte removed due to arthritis and tendon repair    HYSTERECTOMY, TOTAL ABDOMINAL (CERVIX REMOVED)  [facility]  11/2008    L5-S1- [address]   2005    OTHER SURGICAL HISTORY Right     Basa Cell Lesion Removal-elbow    OTHER SURGICAL HISTORY Right 2002    ORIF ankle    SC EGD INTRMURAL NEEDLE ASPIR/BIOP ALTERED ANATOMY N/A 08/15/2018    Dr Earnestine Sharma EGD TRANSORAL BIOPSY SINGLE/MULTIPLE N/A 12/06/2017    EGD BIOPSY performed by Genoveva Zhang DO at [address] [facility] [address] Right     ROTATOR CUFF REPAIR Left 12/2013    Dr Simmons Ly  05/19/2005    DR. KIM    UPPER GASTROINTESTINAL ENDOSCOPY  10/22/2009    peptic stricture, schatzki ring dilated 46 fr, sm hiatal hernia, gastritis    UPPER GASTROINTESTINAL ENDOSCOPY  10/01/2014    Dr Hauser-empiric dilation, urease neg    UPPER GASTROINTESTINAL ENDOSCOPY N/A 12/18/2015    Dr Jocy Veloz, stricture/ulcers, repeat 8 wks    UPPER GASTROINTESTINAL ENDOSCOPY  12/06/2017    Dr Hauser-w/dilation over wire, [address]-hiatal hernia, distal esophageal stricture    UPPER GASTROINTESTINAL ENDOSCOPY  08/15/2018    Dr SANYA Finley-joey/dilation over wire-[address] [place] [address] and EUS-Possible duodenitis, gastritis, duodenal stricture likely peptic stricture nature, hiatal hernia    UPPER GASTROINTESTINAL ENDOSCOPY  08/15/2018    Dr SANYA Finley-w/dilation over wire-[address] and EUS-Possible duodenitis, gastritis, duodenal stricture likely peptic stricture nature, hiatal hernia         Current Facility-Administered Medications:     enoxaparin (LOVENOX) injection 40 mg, 40 mg, SubCUTAneous, Daily, Treasure Sullivan MD, 40 mg at 10/16/22 9674    aspirin EC tablet 81 mg, 81 mg, Oral, Daily, 81 mg at 10/16/22 0775 **OR** aspirin suppository 300 mg, 300 mg, Rectal, Daily, Treasure Sullivan MD    sodium chloride flush 0.9 % injection 5-40 mL, 5-40 mL, IntraVENous, 2 times per day, Treasure Sullivan MD, 10 mL at 10/16/22 0737    sodium chloride flush 0.9 % injection 5-40 mL, 5-40 mL, IntraVENous, PRN, Treasure Sullivan MD    0.9 % sodium chloride infusion, , IntraVENous, PRN, Treasure Sullivan MD    ondansetron (ZOFRAN-ODT) disintegrating tablet 4 mg, 4 mg, Oral, Q8H PRN **OR** ondansetron (ZOFRAN) injection 4 mg, 4 mg, IntraVENous, Q6H PRN, Treasure Sullivan MD    acetaminophen (TYLENOL) tablet 650 mg, 650 mg, Oral, Q6H PRN **OR** acetaminophen (TYLENOL) suppository 650 mg, 650 mg, Rectal, Q6H PRN, Treasure Sullivan MD    atorvastatin (LIPITOR) tablet 80 mg, 80 mg, Oral, Nightly, Treasure Sullivan MD    labetalol (NORMODYNE;TRANDATE) injection 10 mg, 10 mg, IntraVENous, Q10 Min PRN, Treasure Sullivan MD    melatonin disintegrating tablet 5 mg, 5 mg, Oral, Nightly PRN, Treasure Sullivan MD    calcium carbonate (TUMS) chewable tablet 500 mg, 500 mg, Oral, TID PRN, Treasure Sullivan MD    bisacodyl (DULCOLAX) suppository 10 mg, 10 mg, Rectal, Daily PRN, Treasure Sullivan MD    nitroGLYCERIN (NITROSTAT) SL tablet 0.4 mg, 0.4 mg, SubLINGual, Q5 Min PRN, Treasure Sullivan MD    insulin glargine (LANTUS) injection vial 16 Units, 16 Units, SubCUTAneous, Nightly, Treasure Sullivan MD, 16 Units at 10/15/22 2033    levothyroxine (SYNTHROID) tablet 75 mcg, 75 mcg, Oral, Daily, Treasure Sullivan MD, 75 mcg at 10/16/22 0734    pantoprazole (PROTONIX) tablet 40 mg, 40 mg, Oral, Daily, Treasure Sullivan MD, 40 mg at 10/16/22 0727    insulin lispro (HUMALOG) injection vial 0-4 Units, 0-4 Units, SubCUTAneous, TID WC, Remedios Ferrara MD    insulin lispro (HUMALOG) injection vial 0-4 Units, 0-4 Units, SubCUTAneous, Nightly, Remedios Ferrara MD    glucose chewable tablet 16 g, 4 tablet, Oral, PRN, Remedios Ferrara MD    dextrose bolus 10% 125 mL, 125 mL, IntraVENous, PRN **OR** dextrose bolus 10% 250 mL, 250 mL, IntraVENous, PRN, Remedios Ferrara MD    glucagon (rDNA) injection 1 mg, 1 mg, SubCUTAneous, PRN, Remedios Ferrara MD    dextrose 10 % infusion, , IntraVENous, Continuous PRN, Remedios Ferrara MD    Allergies:  Perflutren lipid microsphere, Januvia [sitagliptin], and Vancomycin    Social History:   Social History     Tobacco Use   Smoking Status Former    Packs/day: 0.50    Years: 10.00    Pack years: 5.00    Types: Cigarettes    Quit date: 1989    Years since quittin.1   Smokeless Tobacco Never     Social History     Substance and Sexual Activity   Alcohol Use Yes    Comment: glass of wine about once per month         Family History:   Family History   Problem Relation Age of Onset    Breast Cancer Mother     Blindness Son         injured in a detention fight, he was a guard    Kidney stones Son     No Known Problems Daughter     Colon Cancer Neg Hx     Colon Polyps Neg Hx     Esophageal Cancer Neg Hx     Liver Disease Neg Hx     Liver Cancer Neg Hx     Rectal Cancer Neg Hx     Stomach Cancer Neg Hx        REVIEW OF SYSTEMS:  Review of Systems   Constitutional: Negative. HENT:  Positive for trouble swallowing. Eyes: Negative. Respiratory: Negative. Cardiovascular: Negative. Gastrointestinal: Negative. Endocrine: Negative. Genitourinary: Negative. Musculoskeletal:  Positive for neck pain. Skin: Negative. Allergic/Immunologic: Negative. Neurological:  Positive for weakness. Hematological: Negative. Psychiatric/Behavioral: Negative.        PHYSICAL EXAM:    Vitals:    10/16/22 1022   BP: (!) 149/78   Pulse: 70   Resp: 16   Temp: 97.5 °F (36.4 °C)   SpO2: 98%       Constitutional: Appears well-developed and well-nourished. Eyes - conjunctiva normal.  Pupils react to light  Ear, nose,throat -Normal pinna and tragus, No scars, or lesions over external nose or ears, no obvious atrophy of tongue  Neck- symmetric, trachea midline, no jugular vein distension, no thyromegaly  Respiration- chest wall symmetric, normal effort without use of accessory muscles  Musculoskeletal - no significant wasting of muscles noted, no bony deformities, symmetric bulk  Extremities- no clubbing, cyanosis or edema  Skin - warm, dry, and intact. No rash,erythema, or pallor.   Psychiatric - mood, affect, and behavior appear normal.       NEUROLOGIC EXAM:    MENTAL STATUS: Alert, oriented, thought content appropriate    CRANIAL NERVES: PERRL, EOMI, symmetric facies, tongue midline      MOTOR:     Right Upper Extremity:    Deltoid: 5/5  Biceps: 5/5  Triceps: 5/5  : 5/5    Left Upper Extremity:    Deltoid: 5/5  Biceps: 5/5  Triceps: 5/5  : 5/5    Right Lower Extremity:    Hip Flexion: 5/5  Knee Extension: 5/5  Ankle Plantarflexion: 5/5  Ankle Dorsiflexion: 5/5      Left Lower Extremity:    Hip Flexion: 5/5  Knee Extension: 5/5  Ankle Plantarflexion: 5/5  Ankle Dorsiflexion: 5/5      SOMATOSENSORY:     Right Upper Extremity: normal light touch sensation  Left Upper Extremity: normal light touch sensation  Right Lower Extremity: normal light touch sensation  Left Lower Extremity: normal light touch sensation      REFLEXES:    Biceps: 2+  Patella: 2+      Huynh's: Negative  Clonus: Negative        DATA:    Height: 5' 3.5\" (1.613 m), Weight: 161 lb 14.4 oz (73.4 kg), BP: (!) 149/78      Lab Results   Component Value Date    WBC 6.1 10/15/2022    HGB 11.0 (L) 10/15/2022    HCT 34.9 (L) 10/15/2022    MCV 86.8 10/15/2022     10/15/2022     Lab Results   Component Value Date     10/15/2022    K 3.8 10/15/2022     10/15/2022    CO2 24 10/15/2022    BUN 20 10/15/2022    CREATININE 1.0 (H) 10/15/2022    GLUCOSE 115 (H) 10/15/2022    CALCIUM 9.1 10/15/2022    PROT 6.4 (L) 10/15/2022    LABALBU 3.9 10/15/2022    BILITOT 0.5 10/15/2022    ALKPHOS 67 10/15/2022    AST 13 10/15/2022    ALT 10 10/15/2022    LABGLOM 53 (A) 10/15/2022    GFRAA >59 10/15/2022     Lab Results   Component Value Date    INR 0.96 10/15/2022    INR 0.90 07/25/2021    PROTIME 12.6 10/15/2022    PROTIME 12.4 07/25/2021       MRI CERVICAL SPINE W WO CONTRAST    Result Date: 10/16/2022  1. Cervical spondylosis. 2. Cervical spine degenerative disc disease with disc herniations causing neural compromise as detailed above. Recommendation: Follow up as clinically indicated. Electronically Signed by Rachid Márquez MD at 16-Oct-2022 03:30:48 AM             MRI BRAIN W WO CONTRAST    Result Date: 10/15/2022   1. Chronic microvascular ischemic changes with age-appropriate parenchymal volume loss. 2. No evidence of acute cortical infarction or intracranial hemorrhage. 3. No abnormal contrast enhancement is detected. Recommendation: Follow up as clinically indicated. Electronically Signed by Yuval Hancock MD at 15-Oct-2022 03:49:38 PM                 IMAGING:    MRI cervical spine reviewed. Alignment is anatomic. There is multilevel DDD and spondylosis. There is no high-grade spinal canal stenosis or spinal cord compression at any level. There is no more than moderate foraminal stenosis at any level in the cervical spine. MRI brain reviewed. Chronic microvascular ischemic changes. No evidence of intracranial hemorrhage or mass. No diffusion restriction to suggest acute ischemia. ASSESSMENT AND PLAN:  This is a [de-identified] y.o. female who presents with complaints of several recurrent episodes neck/throat pain as well as paresthesias and weakness in her bilateral upper extremities. She has undergone a fairly extensive imaging workup with MRI scans of her brain and cervical spine. She does not describe any radicular pain and her strength and sensation is normal on exam.  She does not have any myelopathic findings or complaints. Her MRI of the cervical spine demonstrates age-appropriate degenerative changes without any evidence of spinal cord compression or severe canal or foraminal stenosis. I do not feel that the symptoms she is experiencing are related to pathology in her cervical spine. I will defer further workup of her symptoms to her primary team and other consultants. She does not require any scheduled neurosurgical follow up.             Ramy Tafoya MD

## 2022-10-16 NOTE — PROGRESS NOTES
[facility] Neurology Progress Note      Patient:   Khanh Padgett  MR#:    [id number]   Room:    0713/713-02   YOB: 1940  Date of Progress Note: 10/16/2022  Time of Note                           11:36 AM  Consulting Physician:  Suzanna Pugh DO  Attending Physician:  Shani Lay MD      INTERVAL HISTORY:  No acute events, no new focal complains, doing about the same. REVIEW OF SYSTEMS:  Constitutional: No fevers No chills  Neck:No stiffness  Respiratory: No shortness of breath  Cardiovascular: No chest pain No palpitations  Gastrointestinal: No abdominal pain    Genitourinary: No Dysuria  Neurological: No headache, no confusion    PHYSICAL EXAM:    Constitutional -   BP (!) 149/78   Pulse 70   Temp 97.5 °F (36.4 °C) (Temporal)   Resp 16   Ht 5' 3.5\" (1.613 m)   Wt 161 lb 9.6 oz (73.3 kg)   SpO2 98%   BMI 28.18 kg/m²   General appearance: No acute distress   EYES -   Conjunctiva normal  Pupillary exam as below, see CN exam in the neurologic exam  ENT-    No scars, masses, or lesions over external nose or ears  Hearing normal bilaterally to finger rub  Neck-   No neck masses noted  Thyroid normal   No jugular vein distension  Cardiovascular -   No clubbing, cyanosis, or edema   Pulmonary-   Good expansion, normal effort without use of accessory muscles  Musculoskeletal -   No significant wasting of muscles noted  Gait as below, see gait exam in the neurologic exam  Muscle strength, tone, stability as below see the motor exam in the neurologic exam.   No bony deformities  Skin -   Warm, dry, and intact to inspection and palpation.     No rash, erythema, or pallor  Psychiatric -   Mood, affect, and behavior appear normal    Memory as below see mental status examination in the neurologic exam      NEUROLOGICAL EXAM    Mental status   [x] Awake, alert, oriented   [x] Affect attention and concentration appear appropriate  [x] Recent and remote memory appears unremarkable  [x] Speech normal without dysarthria or aphasia, comprehension and repetition intact. COMMENTS:   Cranial Nerves [x] No VF deficit to confrontation  [x] PERRLA, EOMI, no nystagmus, conjugate eye movements, no ptosis  [x] Face symmetric  [x] Facial sensation intact  [x] Tongue midline no atrophy or fasciculations present  [x] Palate midline, hearing to finger rub normal  [x] Shoulder shrug and SCM testing normal  COMMENTS:   Motor   [x] 5/5 strength x 4 extremities  [x] Normal bulk and tone  [x] No tremor present  [x] No rigidity or bradykinesia noted  COMMENTS:   Sensory  [x] Sensation intact to light touch, pin prick, vibration, and proprioception BLE  [] Sensation intact to light touch, pin prick, vibration, and proprioception BUE  COMMENTS:   Coordination [x] FTN normal bilaterally   [] HTS normal bilaterally  [] JAYLEN normal.   COMMENTS:   Reflexes  [x] Symmetric and non-pathological  [x] Toes downgoing bilaterally  [x] No clonus present  COMMENTS:   Gait                  [] Normal steady gait    [] Ataxic    [] Spastic     [] Magnetic     [] Shuffling  [x] Not assessed  COMMENTS:       LABS/IMAGING:    MRI CERVICAL SPINE W WO CONTRAST    Result Date: 10/16/2022  NO PRIOR REPORT AVAILABLE Exam: MRI OF THE CERVICAL SPINE WITHOUT AND WITH CONTRAST Clinical data: Weakness. Technique: Multiplanar multisequence MRI of the cervical spine without and with contrast. Axial imaging is performed throughout the cervical spine. Contrast used: MultiHance. Amount: 15 mL. Prior studies: No prior studies submitted. Findings: Imaged posterior fossa is unremarkable. Craniocervical junction is intact. There is normal alignment without acute fracture. Vertebral body heights are maintained. There is normal marrow signal of the vertebrae. Cervical cord is in anatomic location. No abnormal signal involves the cord. No extra-axial masses. Surrounding soft tissues are unremarkable. Straightening of the cervical lordosis is noted.  Anterior and posterior marginal osteophytes are noted at multiple levels in the cervical spine. Disc desiccative changes are noted at all levels in the cervical spine with multilevel reduction in the disc height. Mild anterolisthesis of C3 over C4 is noted without spondylolysis. Level by leveldisease is present as follows: C1-C2:No significant osteoarthritis. C2-C3: There is no abnormally positioned disc material. There is no significant spinal canal, lateral recess, neural foramina compromise, or nerve impingement. C3-C4: 1 minimal sized postero-central and minimal bilateral paracentral protrusion of the disc indents thecal sac without nerve root impingement. C4-C5: Postero-central, bilateral foraminal moderate 4 mm sized protrusion of the disc causing thecal sac indentation with bilateral moderate neural foraminal narrowing and bilateral C5 exiting nerve root compression. C5-C6: Moderate sized 3 mm sized postero-central and bilateral foraminal disc osteophyte complex causing thecal sac indentation with bilateral exiting nerve root compression. C6-C7: 2 mm sized postero-central and bilateral foraminal protrusion of the disc causing thecal sac indentation with mild impingement upon bilateral C7 nerve root. C7-T1: There is no abnormally positioned disc material. There is no significant spinal canal, lateral recess, neural foramina compromise, or nerve impingement. Post contrast imaging demonstrates no abnormal cord, leptomeningeal or soft tissue enhancement. 1. Cervical spondylosis. 2. Cervical spine degenerative disc disease with disc herniations causing neural compromise as detailed above. Recommendation: Follow up as clinically indicated. Electronically Signed by Eddie Monge MD at 16-Oct-2022 03:30:48 AM             MRI BRAIN W WO CONTRAST    Result Date: 10/15/2022  Exam: MRI OF THE BRAIN WITHOUT AND WITH INTRAVENOUS CONTRAST  Clinical data: Abnormal CT scan, not otherwise specified. Possible stroke. Weakness. Technique: Multiplanar multi-sequence MRI of the brain without and with intravenous gadolinium. Diffusion-weighted imaging is submitted. Contrast used: MultiHance. Amount: 15 mL. Prior studies: No prior studies submitted. Findings:  Patchy hyperintense T2 signal in the periventricular white matter compatible with chronic microvascular ischemic changes. Small remote lacunar infarct in the left basal ganglia. . No evidence of acute cortical infarction, hemorrhage, mass or mass effect is seen. No abnormal contrast enhancement is detected. The ventricles and sulci are slightly prominent but are symmetric. No hydrocephalus or  abnormal extra-axial fluid collections are present. The marrow signal within the skull base and calvarium is intact. The paranasal sinuses and mastoid air cells are clear. Susceptibility artifact partially obscures evaluation of the inferior right frontal and anterior right temporal lobes, likely related to dental hardware. DWI/ADC:  No evidence of restricted diffusion. 1. Chronic microvascular ischemic changes with age-appropriate parenchymal volume loss. 2. No evidence of acute cortical infarction or intracranial hemorrhage. 3. No abnormal contrast enhancement is detected. Recommendation: Follow up as clinically indicated.  Electronically Signed by Ji Menendez MD at 15-Oct-2022 03:49:38 PM               Lab Results   Component Value Date    WBC 6.1 10/15/2022    HGB 11.0 (L) 10/15/2022    HCT 34.9 (L) 10/15/2022    MCV 86.8 10/15/2022     10/15/2022     Lab Results   Component Value Date     10/15/2022    K 3.8 10/15/2022     10/15/2022    CO2 24 10/15/2022    BUN 20 10/15/2022    CREATININE 1.0 (H) 10/15/2022    GLUCOSE 115 (H) 10/15/2022    CALCIUM 9.1 10/15/2022    PROT 6.4 (L) 10/15/2022    LABALBU 3.9 10/15/2022    BILITOT 0.5 10/15/2022    ALKPHOS 67 10/15/2022    AST 13 10/15/2022    ALT 10 10/15/2022    LABGLOM 53 (A) 10/15/2022    GFRAA >59 10/15/2022     Lab Results   Component Value Date    INR 0.96 10/15/2022    INR 0.90 07/25/2021    PROTIME 12.6 10/15/2022    PROTIME 12.4 07/25/2021       RECORD REVIEW:   Previous medical records, medications were reviewed at today's visit. Nursing/physician notes, imaging, labs and vitals reviewed. PT,OT and/or speech notes reviewed       ASSESSMENT:  [de-identified] y.o. admitted with atypical paresthesias / tightness involving the throat, upper chest, possible extremity weakness, outside head CT with possible acute stroke. Follow up MRI here negative for new stroke, nothing cute. Elevated troponin, blood pressure. Increased anxiety. MRI C-spine with ddd, neurosurgery has evaluated. CD negative. PLAN:   Follow up ECHO, Tele   On antiplatelet therapy, statin, risk factor maximization   Cardiology following. Neurosurgery has evaluated ddd on C-spine MRI, nothing overtly surgical noted. PT, OT, ST   DVT proph      Please feel free to call with any questions. [phone] (cell phone).       Laurita Bess DO  Board Certified Neurology

## 2022-10-16 NOTE — DISCHARGE SUMMARY
Discharge Summary      Date:10/16/2022        Patient Name:Jarred Story     YOB: 1940     Age:81 y.o. Admit Date:10/15/2022   Admission Condition:fair   Discharged Condition:stable  Discharge Date: 10/16/22       Discharge Diagnoses   Principal Problem:    Left-sided cerebrovascular accident (CVA) [facility])  Resolved Problems:    * No resolved hospital problems. [facility] Stay   Narrative of Hospital Course:     76-year-old lady with a history of degenerative disc disease, type 2 diabetes, coronary artery disease status post stent placement, GERD, hypertension, hypothyroidism, hypercholesterolemia, presented to the hospital 10/15 from outside facility with concerns of possible CVA. CT head at outside facility with possible acute infarct, in-house MRI of the head was obtained which was negative for any acute abnormality. Patient with complaint of upper extremity weakness, MRI of the cervical spine was obtained which showed cervical degenerative disc disease with some nerve compromise. Patient was evaluated by neurosurgery who stated no evidence of spinal cord compression at any level. Patient was noted to have mild elevation in troponin level, no complaints of chest pain, EKG unchanged from prior. Patient had normal cardiac cath 5 months ago with patent right coronary artery stent. Evaluated by cardiology and patient was cleared for home discharge by all specialist.  Follow-up outpatient with PCP for continuous management of chronic medical problems. Physical Examination:  General: Well-developed, no acute distress lying comfortably in bed. HEENT: Atraumatic normocephalic, range of motion normal, no JVD, no tracheal deviation noted.   Cardiac: Normal S1-S2   Respiratory: clear To auscultation bilaterally, no rhonchi or rales, no wheezing  Abdomen: Soft, positive bowel sounds in all quadrants, no distention, nontender to palpation  Extremities: no tenderness, no edema, moves all extremities  Psych: Affect normal and good eye contact, behavioral normal.  Neuro: No focal deficit noted        Consultants:   IP CONSULT TO NEUROLOGY  IP CONSULT TO CARDIOLOGY  IP CONSULT TO NEUROSURGERY    Time Spent on Discharge:  35 minutes were spent in patient examination, evaluation, counseling as well as medication reconciliation, prescriptions for required medications, discharge plan and follow up. Surgeries/Procedures Performed:  NONE       Significant Diagnostic Studies:   Recent Labs:  CBC:   Lab Results   Component Value Date/Time    WBC 6.1 10/15/2022 01:28 AM    RBC 4.02 10/15/2022 01:28 AM    HGB 11.0 10/15/2022 01:28 AM    HCT 34.9 10/15/2022 01:28 AM    MCV 86.8 10/15/2022 01:28 AM    MCH 27.4 10/15/2022 01:28 AM    MCHC 31.5 10/15/2022 01:28 AM    RDW 13.1 10/15/2022 01:28 AM     10/15/2022 01:28 AM     BMP:    Lab Results   Component Value Date/Time    GLUCOSE 115 10/15/2022 01:28 AM     10/15/2022 01:28 AM    K 3.8 10/15/2022 01:28 AM    K 4.0 05/12/2022 01:52 AM     10/15/2022 01:28 AM    CO2 24 10/15/2022 01:28 AM    ANIONGAP 13 10/15/2022 01:28 AM    BUN 20 10/15/2022 01:28 AM    CREATININE 1.0 10/15/2022 01:28 AM    CALCIUM 9.1 10/15/2022 01:28 AM    LABGLOM 53 10/15/2022 01:28 AM    GFRAA >59 10/15/2022 01:28 AM       Radiology Last 7 Days:  MRI CERVICAL SPINE W WO CONTRAST    Result Date: 10/16/2022  1. Cervical spondylosis. 2. Cervical spine degenerative disc disease with disc herniations causing neural compromise as detailed above. Recommendation: Follow up as clinically indicated. Electronically Signed by Jodi Arzola MD at 16-Oct-2022 03:30:48 AM             MRI BRAIN W WO CONTRAST    Result Date: 10/15/2022   1. Chronic microvascular ischemic changes with age-appropriate parenchymal volume loss. 2. No evidence of acute cortical infarction or intracranial hemorrhage. 3. No abnormal contrast enhancement is detected.   Recommendation: Follow up as clinically indicated. Electronically Signed by Ji Menendez MD at 15-Oct-2022 03:49:38 PM               Discharge Plan   Disposition: Home    Provider Follow-Up:   No follow-up provider specified.        Patient Instructions   Diet: cardiac diet and diabetic diet    Activity: activity as tolerated      Discharge Medications         Medication List        START taking these medications      ezetimibe 10 MG tablet  Commonly known as: Maribeth Forth taking these medications      aspirin EC 81 MG EC tablet  Take 1 tablet by mouth daily     GLUCOPHAGE PO     insulin glargine 100 UNIT/ML injection vial  Commonly known as: LANTUS     levothyroxine 75 MCG tablet  Commonly known as: SYNTHROID     LISINOPRIL PO     pantoprazole 40 MG tablet  Commonly known as: PROTONIX     vitamin D 1.25 MG (12501 UT) Caps capsule  Commonly known as: ERGOCALCIFEROL            STOP taking these medications      carvedilol 3.125 MG tablet  Commonly known as: COREG     clopidogrel 75 MG tablet  Commonly known as: PLAVIX              Electronically signed by Karla Perez MD on 10/16/22 at 1:37 PM CDT

## 2022-10-17 LAB
EKG P AXIS: 40 DEGREES
EKG P-R INTERVAL: 228 MS
EKG Q-T INTERVAL: 428 MS
EKG QRS DURATION: 90 MS
EKG QTC CALCULATION (BAZETT): 427 MS
EKG T AXIS: -4 DEGREES

## 2022-10-17 PROCEDURE — 93010 ELECTROCARDIOGRAM REPORT: CPT | Performed by: INTERNAL MEDICINE

## 2022-12-05 ENCOUNTER — HOSPITAL ENCOUNTER (OUTPATIENT)
Dept: GENERAL RADIOLOGY | Facility: HOSPITAL | Age: 82
Discharge: HOME | End: 2022-12-05

## 2022-12-05 ENCOUNTER — HOSPITAL ENCOUNTER (OUTPATIENT)
Dept: MAMMOGRAPHY | Facility: HOSPITAL | Age: 82
Discharge: HOME | End: 2022-12-05

## 2022-12-05 ENCOUNTER — HOSPITAL ENCOUNTER (OUTPATIENT)
Dept: BONE DENSITY | Facility: HOSPITAL | Age: 82
Discharge: HOME | End: 2022-12-05

## 2022-12-05 DIAGNOSIS — Z12.31 ENCOUNTER FOR SCREENING MAMMOGRAM FOR MALIGNANT NEOPLASM OF BREAST: ICD-10-CM

## 2022-12-05 DIAGNOSIS — Z78.0 POSTMENOPAUSAL STATUS: ICD-10-CM

## 2022-12-05 DIAGNOSIS — R91.1 COIN LESION: ICD-10-CM

## 2022-12-05 PROCEDURE — 77063 BREAST TOMOSYNTHESIS BI: CPT

## 2022-12-05 PROCEDURE — 77080 DXA BONE DENSITY AXIAL: CPT

## 2022-12-05 PROCEDURE — 71046 X-RAY EXAM CHEST 2 VIEWS: CPT

## 2022-12-05 PROCEDURE — 77067 SCR MAMMO BI INCL CAD: CPT

## 2023-01-19 ENCOUNTER — DOCUMENTATION (OUTPATIENT)
Dept: CT IMAGING | Facility: HOSPITAL | Age: 83
End: 2023-01-19
Payer: MEDICARE

## 2023-01-19 NOTE — PROGRESS NOTES
I spoke with PCP office about incidental finding on CXR. They are suppose to discuss with Dr. Geraldine Heredia and let me know plans for f/u imaging.

## 2023-03-29 ENCOUNTER — OFFICE VISIT (OUTPATIENT)
Dept: GASTROENTEROLOGY | Age: 83
End: 2023-03-29
Payer: MEDICARE

## 2023-03-29 VITALS
HEIGHT: 64 IN | OXYGEN SATURATION: 95 % | BODY MASS INDEX: 27.31 KG/M2 | DIASTOLIC BLOOD PRESSURE: 80 MMHG | SYSTOLIC BLOOD PRESSURE: 135 MMHG | HEART RATE: 92 BPM | WEIGHT: 160 LBS

## 2023-03-29 DIAGNOSIS — R06.02 SHORTNESS OF BREATH: ICD-10-CM

## 2023-03-29 DIAGNOSIS — K21.9 CHRONIC GERD: Primary | ICD-10-CM

## 2023-03-29 DIAGNOSIS — R13.10 DYSPHAGIA, UNSPECIFIED TYPE: ICD-10-CM

## 2023-03-29 DIAGNOSIS — Z86.010 HISTORY OF COLON POLYPS: ICD-10-CM

## 2023-03-29 PROCEDURE — G8427 DOCREV CUR MEDS BY ELIG CLIN: HCPCS | Performed by: NURSE PRACTITIONER

## 2023-03-29 PROCEDURE — G8484 FLU IMMUNIZE NO ADMIN: HCPCS | Performed by: NURSE PRACTITIONER

## 2023-03-29 PROCEDURE — G8417 CALC BMI ABV UP PARAM F/U: HCPCS | Performed by: NURSE PRACTITIONER

## 2023-03-29 PROCEDURE — 1123F ACP DISCUSS/DSCN MKR DOCD: CPT | Performed by: NURSE PRACTITIONER

## 2023-03-29 PROCEDURE — 1036F TOBACCO NON-USER: CPT | Performed by: NURSE PRACTITIONER

## 2023-03-29 PROCEDURE — 99214 OFFICE O/P EST MOD 30 MIN: CPT | Performed by: NURSE PRACTITIONER

## 2023-03-29 PROCEDURE — G8400 PT W/DXA NO RESULTS DOC: HCPCS | Performed by: NURSE PRACTITIONER

## 2023-03-29 PROCEDURE — 1090F PRES/ABSN URINE INCON ASSESS: CPT | Performed by: NURSE PRACTITIONER

## 2023-03-29 ASSESSMENT — ENCOUNTER SYMPTOMS
VOMITING: 0
ABDOMINAL DISTENTION: 0
SHORTNESS OF BREATH: 0
COUGH: 0
ANAL BLEEDING: 0
TROUBLE SWALLOWING: 0
BLOOD IN STOOL: 0
ABDOMINAL PAIN: 0
RECTAL PAIN: 0
DIARRHEA: 0
NAUSEA: 0
CONSTIPATION: 0
CHOKING: 0

## 2023-03-29 NOTE — PROGRESS NOTES
Subjective:     Patient ID: Anitra Estrada is a 81 y.o. female  PCP: Jared Farmer DO  Referring Provider: Juan Diego Douglas,*    HPI  Patient presents to the office today with the following complaints: Gastroesophageal Reflux and Nausea      Patient seen in the office today with the main complaint of chest pain and difficulty catching her breath and difficulty laying down due to the increased difficulty breathing. Reports this has been going on since November, reports she has had her heart and lungs checked and all was well. However the chest xray done 12/5/2022 at [facility] shows a possible 6mm upper lobe nodule (report is in Epic). Reports she does have difficulty swallowing and has had her esophagus dilated before. Colonoscopy 12/31/2015: reported normal with A 5 year recall    EGD 8/15/2018:  Dr Dea Finley-w/dilation over wire-[address] and EUS-Possible duodenitis, gastritis, duodenal stricture likely peptic stricture nature, hiatal hernia    Chest Xray:12/5/2022 (Full report in Epic)      Assessment:     1. Chronic GERD  2. Dysphagia, unspecified type  3. History of colon polyps  4. Shortness of breath  -     CT CHEST W WO CONTRAST; Future         Plan:   CT chest due to shortness of breath and abnormal chest xray    Schedule Colonoscopy and EGD  Instruct on bowel prep. Nothing to eat or drink after midnight the day of the exam.  Unable to drive for 24 hours after the procedure. No aspirin or nonsteroidal anti-inflammatories for 5 days before procedure. I have discussed the benefits, alternatives, and risks (including bleeding, perforation and death)  for pursuing Endoscopy (EGD/Colonscopy/EUS/ERCP) with the patient and they are willing to continue. We also discussed the need for anesthesia, IV access, proper dietary changes, medication changes if necessary, and need for bowel prep (if ordered) prior to their Endoscopic procedure.   They are aware they must have someone accompany them to

## 2023-03-29 NOTE — PATIENT INSTRUCTIONS
colonoscopy, continue to drink plenty of clear fluids. It is important   to keep yourself hydrated before the exam.     Please follow all instructions as provided for cleansing the bowel. Failure to have an adequately prepped colon may cause you to have incomplete exam with further testing required.      [email or web address]

## 2023-04-04 ENCOUNTER — HOSPITAL ENCOUNTER (INPATIENT)
Age: 83
LOS: 2 days | Discharge: HOME | End: 2023-04-06
Attending: INTERNAL MEDICINE | Admitting: INTERNAL MEDICINE
Payer: MEDICARE

## 2023-04-04 ENCOUNTER — APPOINTMENT (OUTPATIENT)
Dept: GENERAL RADIOLOGY | Age: 83
End: 2023-04-04
Payer: MEDICARE

## 2023-04-04 DIAGNOSIS — I50.21 ACUTE SYSTOLIC CONGESTIVE HEART FAILURE (HCC): Primary | ICD-10-CM

## 2023-04-04 DIAGNOSIS — N17.9 AKI (ACUTE KIDNEY INJURY) (HCC): ICD-10-CM

## 2023-04-04 PROBLEM — N18.32 STAGE 3B CHRONIC KIDNEY DISEASE (CKD) (HCC): Status: ACTIVE | Noted: 2023-04-04

## 2023-04-04 PROBLEM — I50.9 CONGESTIVE HEART FAILURE, UNSPECIFIED HF CHRONICITY, UNSPECIFIED HEART FAILURE TYPE (HCC): Status: ACTIVE | Noted: 2023-04-04

## 2023-04-04 LAB
ALBUMIN SERPL-MCNC: 4.4 G/DL (ref 3.5–5.2)
ALP SERPL-CCNC: 102 U/L (ref 35–104)
ALT SERPL-CCNC: 18 U/L (ref 5–33)
ANION GAP SERPL CALCULATED.3IONS-SCNC: 14 MMOL/L (ref 7–19)
AST SERPL-CCNC: 27 U/L (ref 5–32)
BASOPHILS # BLD: 0.1 K/UL (ref 0–0.2)
BASOPHILS NFR BLD: 1.1 % (ref 0–1)
BILIRUB SERPL-MCNC: 0.9 MG/DL (ref 0.2–1.2)
BNP BLD-MCNC: 5306 PG/ML (ref 0–1800)
BUN SERPL-MCNC: 19 MG/DL (ref 8–23)
CALCIUM SERPL-MCNC: 9.8 MG/DL (ref 8.8–10.2)
CHLORIDE SERPL-SCNC: 107 MMOL/L (ref 98–111)
CO2 SERPL-SCNC: 21 MMOL/L (ref 22–29)
CREAT SERPL-MCNC: 1.4 MG/DL (ref 0.5–0.9)
EOSINOPHIL # BLD: 0.2 K/UL (ref 0–0.6)
EOSINOPHIL NFR BLD: 3.3 % (ref 0–5)
ERYTHROCYTE [DISTWIDTH] IN BLOOD BY AUTOMATED COUNT: 12 % (ref 11.5–14.5)
GLUCOSE BLD-MCNC: 156 MG/DL (ref 70–99)
GLUCOSE SERPL-MCNC: 135 MG/DL (ref 74–109)
HBA1C MFR BLD: 6.1 % (ref 4–6)
HCT VFR BLD AUTO: 39.1 % (ref 37–47)
HGB BLD-MCNC: 12.2 G/DL (ref 12–16)
IMM GRANULOCYTES # BLD: 0 K/UL
LYMPHOCYTES # BLD: 2.2 K/UL (ref 1.1–4.5)
LYMPHOCYTES NFR BLD: 29.5 % (ref 20–40)
MCH RBC QN AUTO: 26.5 PG (ref 27–31)
MCHC RBC AUTO-ENTMCNC: 31.2 G/DL (ref 33–37)
MCV RBC AUTO: 85 FL (ref 81–99)
MONOCYTES # BLD: 0.4 K/UL (ref 0–0.9)
MONOCYTES NFR BLD: 5.2 % (ref 0–10)
NEUTROPHILS # BLD: 4.4 K/UL (ref 1.5–7.5)
NEUTS SEG NFR BLD: 60.6 % (ref 50–65)
PERFORMED ON: ABNORMAL
PLATELET # BLD AUTO: 284 K/UL (ref 130–400)
PMV BLD AUTO: 10.3 FL (ref 9.4–12.3)
POTASSIUM SERPL-SCNC: 5 MMOL/L (ref 3.5–5)
PROT SERPL-MCNC: 7 G/DL (ref 6.6–8.7)
RBC # BLD AUTO: 4.6 M/UL (ref 4.2–5.4)
SARS-COV-2 RDRP RESP QL NAA+PROBE: NOT DETECTED
SODIUM SERPL-SCNC: 142 MMOL/L (ref 136–145)
TROPONIN T SERPL-MCNC: 0.01 NG/ML (ref 0–0.03)
TSH SERPL DL<=0.005 MIU/L-ACNC: 1.42 UIU/ML (ref 0.27–4.2)
WBC # BLD AUTO: 7.3 K/UL (ref 4.8–10.8)

## 2023-04-04 PROCEDURE — 85025 COMPLETE CBC W/AUTO DIFF WBC: CPT

## 2023-04-04 PROCEDURE — 84443 ASSAY THYROID STIM HORMONE: CPT

## 2023-04-04 PROCEDURE — 1210000000 HC MED SURG R&B

## 2023-04-04 PROCEDURE — 83036 HEMOGLOBIN GLYCOSYLATED A1C: CPT

## 2023-04-04 PROCEDURE — 82962 GLUCOSE BLOOD TEST: CPT

## 2023-04-04 PROCEDURE — 96374 THER/PROPH/DIAG INJ IV PUSH: CPT

## 2023-04-04 PROCEDURE — 6370000000 HC RX 637 (ALT 250 FOR IP)

## 2023-04-04 PROCEDURE — 83880 ASSAY OF NATRIURETIC PEPTIDE: CPT

## 2023-04-04 PROCEDURE — 36415 COLL VENOUS BLD VENIPUNCTURE: CPT

## 2023-04-04 PROCEDURE — 71045 X-RAY EXAM CHEST 1 VIEW: CPT

## 2023-04-04 PROCEDURE — 6360000002 HC RX W HCPCS

## 2023-04-04 PROCEDURE — 99285 EMERGENCY DEPT VISIT HI MDM: CPT

## 2023-04-04 PROCEDURE — 84484 ASSAY OF TROPONIN QUANT: CPT

## 2023-04-04 PROCEDURE — 87635 SARS-COV-2 COVID-19 AMP PRB: CPT

## 2023-04-04 PROCEDURE — 6360000002 HC RX W HCPCS: Performed by: NURSE PRACTITIONER

## 2023-04-04 PROCEDURE — 80053 COMPREHEN METABOLIC PANEL: CPT

## 2023-04-04 PROCEDURE — 2580000003 HC RX 258: Performed by: NURSE PRACTITIONER

## 2023-04-04 PROCEDURE — 93005 ELECTROCARDIOGRAM TRACING: CPT | Performed by: INTERNAL MEDICINE

## 2023-04-04 PROCEDURE — 2580000003 HC RX 258

## 2023-04-04 RX ORDER — INSULIN LISPRO 100 [IU]/ML
0-4 INJECTION, SOLUTION INTRAVENOUS; SUBCUTANEOUS
Status: DISCONTINUED | OUTPATIENT
Start: 2023-04-04 | End: 2023-04-06 | Stop reason: HOSPADM

## 2023-04-04 RX ORDER — POTASSIUM CHLORIDE 7.45 MG/ML
10 INJECTION INTRAVENOUS PRN
Status: DISCONTINUED | OUTPATIENT
Start: 2023-04-04 | End: 2023-04-06 | Stop reason: HOSPADM

## 2023-04-04 RX ORDER — FUROSEMIDE 10 MG/ML
40 INJECTION INTRAMUSCULAR; INTRAVENOUS ONCE
Status: COMPLETED | OUTPATIENT
Start: 2023-04-04 | End: 2023-04-04

## 2023-04-04 RX ORDER — ONDANSETRON 4 MG/1
4 TABLET, ORALLY DISINTEGRATING ORAL EVERY 8 HOURS PRN
Status: DISCONTINUED | OUTPATIENT
Start: 2023-04-04 | End: 2023-04-06 | Stop reason: HOSPADM

## 2023-04-04 RX ORDER — SODIUM CHLORIDE 9 MG/ML
INJECTION, SOLUTION INTRAVENOUS PRN
Status: DISCONTINUED | OUTPATIENT
Start: 2023-04-04 | End: 2023-04-06 | Stop reason: HOSPADM

## 2023-04-04 RX ORDER — ACETAMINOPHEN 325 MG/1
650 TABLET ORAL EVERY 6 HOURS PRN
Status: DISCONTINUED | OUTPATIENT
Start: 2023-04-04 | End: 2023-04-06 | Stop reason: HOSPADM

## 2023-04-04 RX ORDER — ENOXAPARIN SODIUM 100 MG/ML
40 INJECTION SUBCUTANEOUS DAILY
Status: DISCONTINUED | OUTPATIENT
Start: 2023-04-04 | End: 2023-04-05

## 2023-04-04 RX ORDER — INSULIN GLARGINE 100 [IU]/ML
16 INJECTION, SOLUTION SUBCUTANEOUS NIGHTLY
Status: DISCONTINUED | OUTPATIENT
Start: 2023-04-04 | End: 2023-04-06 | Stop reason: HOSPADM

## 2023-04-04 RX ORDER — ONDANSETRON 2 MG/ML
4 INJECTION INTRAMUSCULAR; INTRAVENOUS EVERY 6 HOURS PRN
Status: DISCONTINUED | OUTPATIENT
Start: 2023-04-04 | End: 2023-04-06 | Stop reason: HOSPADM

## 2023-04-04 RX ORDER — POTASSIUM CHLORIDE 20 MEQ/1
40 TABLET, EXTENDED RELEASE ORAL PRN
Status: DISCONTINUED | OUTPATIENT
Start: 2023-04-04 | End: 2023-04-06 | Stop reason: HOSPADM

## 2023-04-04 RX ORDER — LISINOPRIL 10 MG/1
10 TABLET ORAL DAILY
Status: DISCONTINUED | OUTPATIENT
Start: 2023-04-04 | End: 2023-04-06 | Stop reason: HOSPADM

## 2023-04-04 RX ORDER — 0.9 % SODIUM CHLORIDE 0.9 %
250 INTRAVENOUS SOLUTION INTRAVENOUS ONCE
Status: COMPLETED | OUTPATIENT
Start: 2023-04-04 | End: 2023-04-04

## 2023-04-04 RX ORDER — DEXTROSE MONOHYDRATE 100 MG/ML
INJECTION, SOLUTION INTRAVENOUS CONTINUOUS PRN
Status: DISCONTINUED | OUTPATIENT
Start: 2023-04-04 | End: 2023-04-06 | Stop reason: HOSPADM

## 2023-04-04 RX ORDER — INSULIN LISPRO 100 [IU]/ML
0-4 INJECTION, SOLUTION INTRAVENOUS; SUBCUTANEOUS NIGHTLY
Status: DISCONTINUED | OUTPATIENT
Start: 2023-04-04 | End: 2023-04-06 | Stop reason: HOSPADM

## 2023-04-04 RX ORDER — SENNA PLUS 8.6 MG/1
1 TABLET ORAL DAILY PRN
Status: DISCONTINUED | OUTPATIENT
Start: 2023-04-04 | End: 2023-04-06 | Stop reason: HOSPADM

## 2023-04-04 RX ORDER — ASPIRIN 81 MG/1
81 TABLET ORAL DAILY
Status: DISCONTINUED | OUTPATIENT
Start: 2023-04-04 | End: 2023-04-06 | Stop reason: HOSPADM

## 2023-04-04 RX ORDER — LEVOTHYROXINE SODIUM 0.03 MG/1
75 TABLET ORAL DAILY
Status: DISCONTINUED | OUTPATIENT
Start: 2023-04-04 | End: 2023-04-06 | Stop reason: HOSPADM

## 2023-04-04 RX ORDER — SODIUM CHLORIDE 0.9 % (FLUSH) 0.9 %
5-40 SYRINGE (ML) INJECTION PRN
Status: DISCONTINUED | OUTPATIENT
Start: 2023-04-04 | End: 2023-04-06 | Stop reason: HOSPADM

## 2023-04-04 RX ORDER — PANTOPRAZOLE SODIUM 40 MG/1
40 TABLET, DELAYED RELEASE ORAL DAILY
Status: DISCONTINUED | OUTPATIENT
Start: 2023-04-04 | End: 2023-04-06 | Stop reason: HOSPADM

## 2023-04-04 RX ORDER — SODIUM CHLORIDE 0.9 % (FLUSH) 0.9 %
5-40 SYRINGE (ML) INJECTION EVERY 12 HOURS SCHEDULED
Status: DISCONTINUED | OUTPATIENT
Start: 2023-04-04 | End: 2023-04-06 | Stop reason: HOSPADM

## 2023-04-04 RX ORDER — MAGNESIUM SULFATE IN WATER 40 MG/ML
2000 INJECTION, SOLUTION INTRAVENOUS PRN
Status: DISCONTINUED | OUTPATIENT
Start: 2023-04-04 | End: 2023-04-06 | Stop reason: HOSPADM

## 2023-04-04 RX ORDER — ACETAMINOPHEN 650 MG/1
650 SUPPOSITORY RECTAL EVERY 6 HOURS PRN
Status: DISCONTINUED | OUTPATIENT
Start: 2023-04-04 | End: 2023-04-06 | Stop reason: HOSPADM

## 2023-04-04 RX ADMIN — FUROSEMIDE 40 MG: 10 INJECTION, SOLUTION INTRAMUSCULAR; INTRAVENOUS at 17:00

## 2023-04-04 RX ADMIN — SODIUM CHLORIDE 250 ML: 9 INJECTION, SOLUTION INTRAVENOUS at 17:00

## 2023-04-04 RX ADMIN — ENOXAPARIN SODIUM 40 MG: 100 INJECTION SUBCUTANEOUS at 21:44

## 2023-04-04 RX ADMIN — INSULIN GLARGINE 16 UNITS: 100 INJECTION, SOLUTION SUBCUTANEOUS at 21:40

## 2023-04-04 RX ADMIN — SODIUM CHLORIDE, PRESERVATIVE FREE 10 ML: 5 INJECTION INTRAVENOUS at 21:41

## 2023-04-04 ASSESSMENT — ENCOUNTER SYMPTOMS
GASTROINTESTINAL NEGATIVE: 1
SHORTNESS OF BREATH: 1
COUGH: 0

## 2023-04-04 ASSESSMENT — PAIN - FUNCTIONAL ASSESSMENT: PAIN_FUNCTIONAL_ASSESSMENT: NONE - DENIES PAIN

## 2023-04-04 NOTE — ED PROVIDER NOTES
voice recognitionprogram.  Efforts were made to edit the dictations but occasionally words are mis-transcribed.)    MARTY aCrter (electronically signed)           MARTY Carter  04/04/23 9925

## 2023-04-05 LAB
ANION GAP SERPL CALCULATED.3IONS-SCNC: 13 MMOL/L (ref 7–19)
BUN SERPL-MCNC: 21 MG/DL (ref 8–23)
CALCIUM SERPL-MCNC: 9.5 MG/DL (ref 8.8–10.2)
CHLORIDE SERPL-SCNC: 107 MMOL/L (ref 98–111)
CHOLEST SERPL-MCNC: 145 MG/DL (ref 160–199)
CO2 SERPL-SCNC: 22 MMOL/L (ref 22–29)
CREAT SERPL-MCNC: 1.4 MG/DL (ref 0.5–0.9)
EKG P AXIS: 48 DEGREES
EKG P AXIS: 64 DEGREES
EKG P-R INTERVAL: 204 MS
EKG P-R INTERVAL: 204 MS
EKG Q-T INTERVAL: 360 MS
EKG Q-T INTERVAL: 364 MS
EKG QRS DURATION: 80 MS
EKG QRS DURATION: 80 MS
EKG QTC CALCULATION (BAZETT): 390 MS
EKG QTC CALCULATION (BAZETT): 407 MS
EKG T AXIS: 93 DEGREES
EKG T AXIS: 95 DEGREES
ERYTHROCYTE [DISTWIDTH] IN BLOOD BY AUTOMATED COUNT: 12 % (ref 11.5–14.5)
GLUCOSE BLD-MCNC: 119 MG/DL (ref 70–99)
GLUCOSE BLD-MCNC: 165 MG/DL (ref 70–99)
GLUCOSE BLD-MCNC: 67 MG/DL (ref 70–99)
GLUCOSE BLD-MCNC: 91 MG/DL (ref 70–99)
GLUCOSE BLD-MCNC: 92 MG/DL (ref 70–99)
GLUCOSE SERPL-MCNC: 110 MG/DL (ref 74–109)
HCT VFR BLD AUTO: 35.3 % (ref 37–47)
HDLC SERPL-MCNC: 47 MG/DL (ref 65–121)
HGB BLD-MCNC: 11.4 G/DL (ref 12–16)
LDLC SERPL CALC-MCNC: 83 MG/DL
LV EF: 33 %
LVEF MODALITY: NORMAL
MAGNESIUM SERPL-MCNC: 2.2 MG/DL (ref 1.6–2.4)
MCH RBC QN AUTO: 27.3 PG (ref 27–31)
MCHC RBC AUTO-ENTMCNC: 32.3 G/DL (ref 33–37)
MCV RBC AUTO: 84.4 FL (ref 81–99)
PERFORMED ON: ABNORMAL
PERFORMED ON: NORMAL
PERFORMED ON: NORMAL
PLATELET # BLD AUTO: 253 K/UL (ref 130–400)
PMV BLD AUTO: 10.8 FL (ref 9.4–12.3)
POTASSIUM SERPL-SCNC: 3.6 MMOL/L (ref 3.5–5)
RBC # BLD AUTO: 4.18 M/UL (ref 4.2–5.4)
SODIUM SERPL-SCNC: 142 MMOL/L (ref 136–145)
TRIGL SERPL-MCNC: 75 MG/DL (ref 0–149)
TROPONIN T SERPL-MCNC: 0.02 NG/ML (ref 0–0.03)
WBC # BLD AUTO: 6.6 K/UL (ref 4.8–10.8)

## 2023-04-05 PROCEDURE — 84484 ASSAY OF TROPONIN QUANT: CPT

## 2023-04-05 PROCEDURE — 6360000002 HC RX W HCPCS

## 2023-04-05 PROCEDURE — 6370000000 HC RX 637 (ALT 250 FOR IP)

## 2023-04-05 PROCEDURE — 83735 ASSAY OF MAGNESIUM: CPT

## 2023-04-05 PROCEDURE — 36415 COLL VENOUS BLD VENIPUNCTURE: CPT

## 2023-04-05 PROCEDURE — 93010 ELECTROCARDIOGRAM REPORT: CPT | Performed by: INTERNAL MEDICINE

## 2023-04-05 PROCEDURE — 80048 BASIC METABOLIC PNL TOTAL CA: CPT

## 2023-04-05 PROCEDURE — 6360000002 HC RX W HCPCS: Performed by: INTERNAL MEDICINE

## 2023-04-05 PROCEDURE — C8929 TTE W OR WO FOL WCON,DOPPLER: HCPCS

## 2023-04-05 PROCEDURE — 6360000004 HC RX CONTRAST MEDICATION

## 2023-04-05 PROCEDURE — 94760 N-INVAS EAR/PLS OXIMETRY 1: CPT

## 2023-04-05 PROCEDURE — 85027 COMPLETE CBC AUTOMATED: CPT

## 2023-04-05 PROCEDURE — 6370000000 HC RX 637 (ALT 250 FOR IP): Performed by: INTERNAL MEDICINE

## 2023-04-05 PROCEDURE — 2580000003 HC RX 258

## 2023-04-05 PROCEDURE — 1210000000 HC MED SURG R&B

## 2023-04-05 PROCEDURE — 80061 LIPID PANEL: CPT

## 2023-04-05 PROCEDURE — 93005 ELECTROCARDIOGRAM TRACING: CPT

## 2023-04-05 PROCEDURE — 82962 GLUCOSE BLOOD TEST: CPT

## 2023-04-05 RX ORDER — FUROSEMIDE 10 MG/ML
40 INJECTION INTRAMUSCULAR; INTRAVENOUS ONCE
Status: COMPLETED | OUTPATIENT
Start: 2023-04-05 | End: 2023-04-05

## 2023-04-05 RX ORDER — ATORVASTATIN CALCIUM 40 MG/1
40 TABLET, FILM COATED ORAL NIGHTLY
Status: DISCONTINUED | OUTPATIENT
Start: 2023-04-05 | End: 2023-04-06 | Stop reason: HOSPADM

## 2023-04-05 RX ORDER — ENOXAPARIN SODIUM 100 MG/ML
30 INJECTION SUBCUTANEOUS DAILY
Status: DISCONTINUED | OUTPATIENT
Start: 2023-04-05 | End: 2023-04-06 | Stop reason: HOSPADM

## 2023-04-05 RX ADMIN — FUROSEMIDE 40 MG: 10 INJECTION, SOLUTION INTRAMUSCULAR; INTRAVENOUS at 11:39

## 2023-04-05 RX ADMIN — ENOXAPARIN SODIUM 30 MG: 100 INJECTION SUBCUTANEOUS at 18:30

## 2023-04-05 RX ADMIN — ASPIRIN 81 MG: 81 TABLET, COATED ORAL at 09:46

## 2023-04-05 RX ADMIN — PERFLUTREN 1.5 ML: 6.52 INJECTION, SUSPENSION INTRAVENOUS at 08:05

## 2023-04-05 RX ADMIN — PANTOPRAZOLE SODIUM 40 MG: 40 TABLET, DELAYED RELEASE ORAL at 09:46

## 2023-04-05 RX ADMIN — SODIUM CHLORIDE, PRESERVATIVE FREE 10 ML: 5 INJECTION INTRAVENOUS at 20:53

## 2023-04-05 RX ADMIN — SODIUM CHLORIDE, PRESERVATIVE FREE 10 ML: 5 INJECTION INTRAVENOUS at 09:46

## 2023-04-05 RX ADMIN — LEVOTHYROXINE SODIUM 75 MCG: 25 TABLET ORAL at 06:09

## 2023-04-05 RX ADMIN — ATORVASTATIN CALCIUM 40 MG: 40 TABLET, FILM COATED ORAL at 20:53

## 2023-04-05 NOTE — CONSULTS
Comprehensive Nutrition Assessment    Type and Reason for Visit:  Initial, Consult    Nutrition Recommendations/Plan:   Diet education 4/6     Nutrition Assessment:    Consult received for CHF diet education. Pt was speaking with another discipline at time of visit. Will f/u 4/6 to provide diet education as requested. Current Nutrition Intake & Therapies:    ADULT DIET; Regular; No Added Salt (3-4 gm)    Anthropometric Measures:  Height: 5' 3\" (160 cm)  Ideal Body Weight (IBW): 115 lbs (52 kg)    Current Body Weight: 151 lb (68.5 kg)  Current BMI (kg/m2): 26.8  BMI Categories: Overweight (BMI 25.0-29. 9)    Nutrition Interventions:   Nutrition Education/Counseling: Education initiated, Education needed    Monica Daley MS, RD, LD  Contact: [phone]

## 2023-04-05 NOTE — CONSULTS
Nurse met with patient re: referral to CHF clinic. Discussed with patient diet, activity, medications, definition of heart failure and s/s, daily wt monitoring with reporting of wt gain of >2-3 lbs in 24 hours or > 5 lbs in one week, BP/HR and activity monitoring with use of daily log, f/u appointments with PCP, CHF clinic and cardiologist. Discussed vaccine use, testing, monitoring of BS. HF packet given. Patient stated she does have a wt scale and BP monitor at home. She is agreeable to participating in the HF program and is happy to see one of the other CHF APRNs other than Tobie Holter. This patient is not happy with Jake Hancock and that is why she hasn't came to past visits. She did not know she could see someone different. CHF #1 set up with Compa Coats APRN for 4/12/23 at 2:15. Time spent with patient 30 minutes.

## 2023-04-05 NOTE — PROGRESS NOTES
Automatic Dose Adjustment of                Subcutaneous Anticoagulant for Prophylaxis    Analia Grimes is a 81 y.o. female. Recent Labs     04/04/23  1516 04/05/23  0154   CREATININE 1.4* 1.4*       Estimated Creatinine Clearance: 29 mL/min (A) (based on SCr of 1.4 mg/dL (H)). Weight:  Wt Readings from Last 1 Encounters:   04/04/23 151 lb (68.5 kg)           Pharmacy has adjusted subcutaneous anticoagulant for prophylaxis to Enoxaparin 30 mg SC daily based on the patient's weight and estimated CrCl per [facility] policy.                ELLA COSME, PHARM D, 4/5/2023, 2:08 PM

## 2023-04-05 NOTE — PROGRESS NOTES
Hospitalist Progress Note      PCP: Dayna Moralez DO    Date of Admission: 4/4/2023    Chief Complaint:    Pt with hx of MI 2 years ago, had PTCA stent intervention. Took plavix for 1 year. Continues with daily ASA. Over the past sever months, started to develop exertional dyspnea, followed by severe orthopnea. Last month she is pretty much sleeping in a recliner - can not lie flat due to severe PND and orthopnea. Last few weeks also noted progressively worsening leg edema and decided to come to ED to have this evaluated. Subjective:     Pt received lasix IV yesterday. She reports she feels markedly better and was actually able to sleep in bed though still partially propped up. She reports leg edema much improved as well - \"I have ankles now\". Pt denies any chest pain associated with her symptoms. Pt again reports her symptoms onset was very gradual over the past several months.          Medications:  Reviewed    Infusion Medications    sodium chloride      dextrose       Scheduled Medications    sodium chloride flush  5-40 mL IntraVENous 2 times per day    enoxaparin  40 mg SubCUTAneous Daily    aspirin EC  81 mg Oral Daily    insulin glargine  16 Units SubCUTAneous Nightly    levothyroxine  75 mcg Oral Daily    pantoprazole  40 mg Oral Daily    [Held by provider] lisinopril  10 mg Oral Daily    insulin lispro  0-4 Units SubCUTAneous TID WC    insulin lispro  0-4 Units SubCUTAneous Nightly     PRN Meds: perflutren lipid microspheres, sodium chloride flush, sodium chloride, ondansetron **OR** ondansetron, acetaminophen **OR** acetaminophen, potassium chloride **OR** potassium alternative oral replacement **OR** potassium chloride, magnesium sulfate, senna, glucose, dextrose bolus **OR** dextrose bolus, glucagon (rDNA), dextrose    No intake or output data in the 24 hours ending 04/05/23 1230    Physical Exam Performed:    /65   Pulse 80   Temp 97.3 °F (36.3 °C)

## 2023-04-06 VITALS
HEIGHT: 63 IN | OXYGEN SATURATION: 98 % | HEART RATE: 74 BPM | WEIGHT: 151 LBS | RESPIRATION RATE: 16 BRPM | TEMPERATURE: 97.5 F | DIASTOLIC BLOOD PRESSURE: 77 MMHG | BODY MASS INDEX: 26.75 KG/M2 | SYSTOLIC BLOOD PRESSURE: 134 MMHG

## 2023-04-06 DIAGNOSIS — I25.10 CORONARY ARTERY DISEASE INVOLVING NATIVE CORONARY ARTERY OF NATIVE HEART WITHOUT ANGINA PECTORIS: Primary | ICD-10-CM

## 2023-04-06 DIAGNOSIS — R06.09 DYSPNEA ON EXERTION: ICD-10-CM

## 2023-04-06 DIAGNOSIS — R93.1 DECREASED CARDIAC EJECTION FRACTION: ICD-10-CM

## 2023-04-06 DIAGNOSIS — R55 SYNCOPE AND COLLAPSE: ICD-10-CM

## 2023-04-06 DIAGNOSIS — R07.9 CHEST PAIN IN ADULT: ICD-10-CM

## 2023-04-06 PROBLEM — Z51.5 PALLIATIVE CARE PATIENT: Status: ACTIVE | Noted: 2023-04-06

## 2023-04-06 LAB
ANION GAP SERPL CALCULATED.3IONS-SCNC: 14 MMOL/L (ref 7–19)
BNP BLD-MCNC: 6268 PG/ML (ref 0–1800)
BUN SERPL-MCNC: 20 MG/DL (ref 8–23)
CALCIUM SERPL-MCNC: 9.6 MG/DL (ref 8.8–10.2)
CHLORIDE SERPL-SCNC: 102 MMOL/L (ref 98–111)
CO2 SERPL-SCNC: 24 MMOL/L (ref 22–29)
CREAT SERPL-MCNC: 1.4 MG/DL (ref 0.5–0.9)
EKG P AXIS: 50 DEGREES
EKG P-R INTERVAL: 180 MS
EKG Q-T INTERVAL: 388 MS
EKG QRS DURATION: 84 MS
EKG QTC CALCULATION (BAZETT): 412 MS
EKG T AXIS: 134 DEGREES
ERYTHROCYTE [DISTWIDTH] IN BLOOD BY AUTOMATED COUNT: 11.8 % (ref 11.5–14.5)
GLUCOSE BLD-MCNC: 113 MG/DL (ref 70–99)
GLUCOSE SERPL-MCNC: 121 MG/DL (ref 74–109)
HCT VFR BLD AUTO: 37.5 % (ref 37–47)
HGB BLD-MCNC: 11.9 G/DL (ref 12–16)
MAGNESIUM SERPL-MCNC: 2 MG/DL (ref 1.6–2.4)
MCH RBC QN AUTO: 27.4 PG (ref 27–31)
MCHC RBC AUTO-ENTMCNC: 31.7 G/DL (ref 33–37)
MCV RBC AUTO: 86.4 FL (ref 81–99)
PERFORMED ON: ABNORMAL
PLATELET # BLD AUTO: 260 K/UL (ref 130–400)
PMV BLD AUTO: 10.7 FL (ref 9.4–12.3)
POTASSIUM SERPL-SCNC: 3.8 MMOL/L (ref 3.5–5)
RBC # BLD AUTO: 4.34 M/UL (ref 4.2–5.4)
SODIUM SERPL-SCNC: 140 MMOL/L (ref 136–145)
WBC # BLD AUTO: 5.8 K/UL (ref 4.8–10.8)

## 2023-04-06 PROCEDURE — 94760 N-INVAS EAR/PLS OXIMETRY 1: CPT

## 2023-04-06 PROCEDURE — 83880 ASSAY OF NATRIURETIC PEPTIDE: CPT

## 2023-04-06 PROCEDURE — 80048 BASIC METABOLIC PNL TOTAL CA: CPT

## 2023-04-06 PROCEDURE — 83735 ASSAY OF MAGNESIUM: CPT

## 2023-04-06 PROCEDURE — 6370000000 HC RX 637 (ALT 250 FOR IP)

## 2023-04-06 PROCEDURE — 6370000000 HC RX 637 (ALT 250 FOR IP): Performed by: INTERNAL MEDICINE

## 2023-04-06 PROCEDURE — 36415 COLL VENOUS BLD VENIPUNCTURE: CPT

## 2023-04-06 PROCEDURE — 85027 COMPLETE CBC AUTOMATED: CPT

## 2023-04-06 PROCEDURE — 82962 GLUCOSE BLOOD TEST: CPT

## 2023-04-06 RX ORDER — CARVEDILOL 3.12 MG/1
3.12 TABLET ORAL 2 TIMES DAILY WITH MEALS
Qty: 60 TABLET | Refills: 3 | Status: SHIPPED | OUTPATIENT
Start: 2023-04-06

## 2023-04-06 RX ORDER — FUROSEMIDE 40 MG/1
40 TABLET ORAL DAILY
Status: DISCONTINUED | OUTPATIENT
Start: 2023-04-06 | End: 2023-04-06 | Stop reason: HOSPADM

## 2023-04-06 RX ORDER — CARVEDILOL 3.12 MG/1
3.12 TABLET ORAL 2 TIMES DAILY WITH MEALS
Status: DISCONTINUED | OUTPATIENT
Start: 2023-04-06 | End: 2023-04-06 | Stop reason: HOSPADM

## 2023-04-06 RX ORDER — ATORVASTATIN CALCIUM 40 MG/1
40 TABLET, FILM COATED ORAL NIGHTLY
Qty: 30 TABLET | Refills: 3 | Status: SHIPPED | OUTPATIENT
Start: 2023-04-06

## 2023-04-06 RX ORDER — FUROSEMIDE 40 MG/1
40 TABLET ORAL DAILY
Qty: 60 TABLET | Refills: 3 | Status: SHIPPED | OUTPATIENT
Start: 2023-04-06

## 2023-04-06 RX ADMIN — FUROSEMIDE 40 MG: 40 TABLET ORAL at 09:52

## 2023-04-06 RX ADMIN — ASPIRIN 81 MG: 81 TABLET, COATED ORAL at 09:52

## 2023-04-06 RX ADMIN — LEVOTHYROXINE SODIUM 75 MCG: 25 TABLET ORAL at 05:32

## 2023-04-06 RX ADMIN — PANTOPRAZOLE SODIUM 40 MG: 40 TABLET, DELAYED RELEASE ORAL at 09:52

## 2023-04-06 RX ADMIN — CARVEDILOL 3.12 MG: 3.12 TABLET, FILM COATED ORAL at 11:10

## 2023-04-06 NOTE — DISCHARGE INSTR - DIET
Good nutrition is important when healing from an illness, injury, or surgery. Follow any nutrition recommendations given to you during your hospital stay. If you were given an oral nutrition supplement while in the hospital, continue to take this supplement at home. You can take it with meals, in-between meals, and/or before bedtime. These supplements can be purchased at most local grocery stores, pharmacies, and chain [employer]stores. If you have any questions about your diet or nutrition, call the hospital and ask for the dietitian. ADULT DIET; Regular;  No Added Salt (3-4 gm)

## 2023-04-06 NOTE — ACP (ADVANCE CARE PLANNING)
Advance Care Planning     Advance Care Planning Activator (Inpatient)  Conversation Note      Date of ACP Conversation: 4/6/2023     [employer] with: Pt with capacity. ACP Activator: Elisa Chacon RN      Health Care Decision Maker:     Current Designated Health Care Decision Maker:     Primary Decision Maker: Neo Vega - [phone]    Secondary Decision Maker: Walter Canales - Child - [phone]    Secondary Decision Maker: Brandon Schwab - Other - [phone]      Care Preferences    Ventilation: \"If you were in your present state of health and suddenly became very ill and were unable to breathe on your own, what would your preference be about the use of a ventilator (breathing machine) if it were available to you? \"      Would the patient desire the use of ventilator (breathing machine)?: Yes        Resuscitation  \"CPR works best to restart the heart when there is a sudden event, like a heart attack, in someone who is otherwise healthy. Unfortunately, CPR does not typically restart the heart for people who have serious health conditions or who are very sick. \"    \"In the event your heart stopped as a result of an underlying serious health condition, would you want attempts to be made to restart your heart (answer \"yes\" for attempt to resuscitate) or would you prefer a natural death (answer \"no\" for do not attempt to resuscitate)? \" Yes       Conversation Outcomes:  ACP discussion completed    Follow-up plan:    [] Schedule follow-up conversation to continue planning  [] Referred individual to Provider for additional questions/concerns   [] Advised patient/agent/surrogate to review completed ACP document and update if needed with changes in condition, patient preferences or care setting    [] This note routed to one or more involved healthcare providers    Electronically signed by Elisa Chacon RN on 4/6/2023 at 11:04 AM

## 2023-04-06 NOTE — PROGRESS NOTES
IV and tele removed. DC instructions reviewed with patient. She verbalizes understanding. The patient was transported to the exit.

## 2023-04-06 NOTE — DISCHARGE SUMMARY
DM - will cont PTA insulin regimen. Physical Exam Performed:     /77   Pulse 74   Temp 97.5 °F (36.4 °C) (Temporal)   Resp 16   Ht 5' 3\" (1.6 m)   Wt 151 lb (68.5 kg)   SpO2 98%   BMI 26.75 kg/m²       General appearance:  No apparent distress, appears stated age and cooperative. HEENT:  Normal cephalic, atraumatic without obvious deformity. Pupils equal, round, and reactive to light. Extra ocular muscles intact. Conjunctivae/corneas clear. Neck: Supple, with full range of motion. No jugular venous distention. Trachea midline. Respiratory:  Normal respiratory effort. Clear to auscultation, bilaterally without Rales/Wheezes/Rhonchi. Cardiovascular:  Regular rate and rhythm with normal S1/S2 without murmurs, rubs or gallops. Abdomen: Soft, non-tender, non-distended with normal bowel sounds. Musculoskeletal:  No clubbing, cyanosis or edema bilaterally. Full range of motion without deformity. Skin: Skin color, texture, turgor normal.  No rashes or lesions. Neurologic:  Neurovascularly intact without any focal sensory/motor deficits. Cranial nerves: II-XII intact, grossly non-focal.  Psychiatric:  Alert and oriented, thought content appropriate, normal insight  Capillary Refill: Brisk,< 3 seconds   Peripheral Pulses: +2 palpable, equal bilaterally       Labs:  For convenience and continuity at follow-up the following most recent labs are provided:      CBC:    Lab Results   Component Value Date/Time    WBC 5.8 04/06/2023 02:47 AM    HGB 11.9 04/06/2023 02:47 AM    HCT 37.5 04/06/2023 02:47 AM     04/06/2023 02:47 AM       Renal:    Lab Results   Component Value Date/Time     04/06/2023 02:47 AM    K 3.8 04/06/2023 02:47 AM    K 4.0 05/12/2022 01:52 AM     04/06/2023 02:47 AM    CO2 24 04/06/2023 02:47 AM    BUN 20 04/06/2023 02:47 AM    CREATININE 1.4 04/06/2023 02:47 AM    CALCIUM 9.6 04/06/2023 02:47 AM    PHOS 2.8 10/15/2022 01:28 AM         Significant

## 2023-04-06 NOTE — CONSULTS
Palliative Care:    Pt presents to ED with c/o SOA and increased edema in lower ext. States she has had SOA since January. Pt is up and about in her room and states she will be dc's today. Awaiting dc paperwork. She admits she waited too long to seek medical attention. Discussed s/s of HF such as edema, SOA. Palliative care initiated for support, goc, ACP. Past Medical History:        Past Medical History:   Diagnosis Date    Fernandes's esophagus     DDD (degenerative disc disease), lumbar     DM (diabetes mellitus), type 2 ([facility] [address]     Duodenal ulcer     E. coli sepsis ([facility] [address] 02/2014    Elevated pancreatic enzyme 2017    Gastritis     GERD (gastroesophageal reflux disease)     Hiatal hernia     History of colon polyps     HTN (hypertension)     Hypercholesterolemia     Hypothyroidism     Osteoarthritis     other     Non Specified Ulcers    Palliative care patient 04/06/2023    Pernicious anemia     Skin cancer     History OF. Venous angioma     left-parietal       Advance Directives:   Full Code. No AD on file. Spouse and dtr are her HCS. Contact list updated. Pain/Other Symptoms:  Denies             How are symptoms affecting QOL   Inability to do normal activities since becoming SOA in January. Psychological/Spiritual:   States good family support. No spiritual support. No [employer] home. Plan:   HF RN consult. Plans for appt on 4/12 for HF clinic. Cardiology Consulted  Diuresed, Med adj. Pt now on diuretic to take at home. Patient/family discussion r/t goals:           (what does living well look like to you? Pt reports that her baseline is active with no needs for assistance. Still maintains her independence. Lives at home with her spouse of 61 yrs.             Palliative Performance Scale:   80                          Electronically signed by Kaylyn Morelos RN on 4/6/2023 at 10:33 AM

## 2023-04-06 NOTE — DISCHARGE INSTRUCTIONS
Keep follow up appointments   Take medications as directed  Seek medical assistance for recurrent or worsening symptoms

## 2023-04-07 ENCOUNTER — CARE COORDINATION (OUTPATIENT)
Dept: CASE MANAGEMENT | Age: 83
End: 2023-04-07

## 2023-04-07 ENCOUNTER — TELEPHONE (OUTPATIENT)
Dept: GASTROENTEROLOGY | Age: 83
End: 2023-04-07

## 2023-04-07 DIAGNOSIS — I50.9 CONGESTIVE HEART FAILURE, UNSPECIFIED HF CHRONICITY, UNSPECIFIED HEART FAILURE TYPE (HCC): Primary | ICD-10-CM

## 2023-04-07 PROCEDURE — 1111F DSCHRG MED/CURRENT MED MERGE: CPT | Performed by: FAMILY MEDICINE

## 2023-04-07 NOTE — CARE COORDINATION
documents    Offered patient enrollment in the Remote Patient Monitoring (RPM) program for in-home monitoring: NA.    Care Transitions 24 Hour Call    Do you have a copy of your discharge instructions?: Yes  Do you have all of your prescriptions and are they filled?: Yes  Have you been contacted by a [id number] [employer] Pharmacist?: No  Have you scheduled your follow up appointment?: Yes  How are you going to get to your appointment?: Car - family or friend to transport  Do you have support at home?: Partner/Spouse/SO  Do you feel like you have everything you need to keep you well at home?: Yes  Are you an active caregiver in your home?: No  Care Transitions Interventions         Discussed follow-up appointments. If no appointment was previously scheduled, appointment scheduling offered: Yes. Is follow up appointment scheduled within 7 days of discharge? Yes. Follow Up : Spoke with patient today for CTN call after discharge from [facility]. She says she is doing fairly well, up eating some breakfast. She says she has her meds, did review them. She says she is doing daily weights and today was 152 lbs. She does keep log. She says her bp was a little low 95/52, CTN advised her to make sure and eat and stay hydrated without overhydrating and it should go up. If she becomes more symptomatic to let her PCP know. She says last night was the first time she was able to sleep in her bed since January. She says she feels so much better. She was seen by CHF nurse Kapil Cherry, TANYA, while inpatient and she discussed watching her weight, diet, etc. Patient is no insulin, says BP was 114 today. She follows with PCP on 4/13 and with Cardio on 4/12. She is scheduled to have CT chest and scope she says, but is unsure if she should continue with those. CTN advised her to call GI and see what they say since she has been in and diuresed.  She has had the Covid vaccine, has PHCDM listed and has LW but not on chart, husbands has been put on by

## 2023-04-20 ENCOUNTER — HOSPITAL ENCOUNTER (OUTPATIENT)
Dept: CT IMAGING | Age: 83
Discharge: HOME | End: 2023-04-20
Payer: MEDICARE

## 2023-04-20 DIAGNOSIS — R06.02 SHORTNESS OF BREATH: ICD-10-CM

## 2023-04-20 LAB — CREAT SERPL-MCNC: 1.9 MG/DL (ref 0.3–1.3)

## 2023-04-20 PROCEDURE — 82565 ASSAY OF CREATININE: CPT

## 2023-04-20 PROCEDURE — 71250 CT THORAX DX C-: CPT | Performed by: RADIOLOGY

## 2023-04-20 PROCEDURE — 71250 CT THORAX DX C-: CPT

## 2023-04-21 ENCOUNTER — CARE COORDINATION (OUTPATIENT)
Dept: CARE COORDINATION | Age: 83
End: 2023-04-21

## 2023-04-21 NOTE — CARE COORDINATION
Hos Rad   6/29/2023  2:00 PM Geyser Done, APRN N [facility] Cardio [facility]         Offered patient enrollment in the Remote Patient Monitoring (RPM) program for in-home monitoring: Patient is not eligible for RPM program.     Care Transitions Subsequent and Final Call    Subsequent and Final Calls  Care Transitions Interventions  Other Interventions:             Care Transition Nurse provided contact information for future needs. Plan for follow-up call in 5-7 days based on severity of symptoms and risk factors.   Plan for next call:   Symptom management- CHF protocol, DM protocol  Medication compliance      Glennda Boas

## 2023-04-24 ENCOUNTER — TELEPHONE (OUTPATIENT)
Dept: GASTROENTEROLOGY | Age: 83
End: 2023-04-24

## 2023-04-24 NOTE — TELEPHONE ENCOUNTER
----- Message from MARTY Turpin sent at 4/21/2023 11:58 AM CDT -----  Small right pleural effusion. Small scattered nodules visualized bilaterally the largest of which is in the  right upper lobe measuring 6 mm. These are most likely postinfectious or  inflammatory etiology.    Please forward to PCP

## 2023-04-27 ENCOUNTER — CARE COORDINATION (OUTPATIENT)
Dept: CASE MANAGEMENT | Age: 83
End: 2023-04-27

## 2023-04-27 NOTE — CARE COORDINATION
Care Transitions Outreach Attempt    Call within 2 business days of discharge: No   Attempted to reach patient for transitions of care follow up. Unable to reach patient. Will f/u at a later time. Patient: Nadege Santos Patient : 1940 MRN: [id number]    Last Discharge  Street       Date Complaint Diagnosis Description Type Department Provider    23 Shortness of Breath Acute systolic congestive heart failure ([address] . ..  ED to Hosp-Admission (Discharged) (ADMITTED) [facility] 5 SURG Cooper Rain MD                Noted following upcoming appointments from discharge chart review:   [facility] follow up appointment(s):   Future Appointments   Date Time Provider Emeka Gonzalez   2023  9:30 AM [facility] NUC MED RM 1 [facility] NUC MED [facility] Hos Rad   2023  2:00 PM MARTY Hammond [facility] Cardio [facility]     Non-BS follow up appointment(s):

## 2023-05-04 ENCOUNTER — TELEPHONE (OUTPATIENT)
Dept: CARDIOTHORACIC SURGERY | Age: 83
End: 2023-05-04

## 2023-05-04 ENCOUNTER — CARE COORDINATION (OUTPATIENT)
Dept: CASE MANAGEMENT | Age: 83
End: 2023-05-04

## 2023-05-04 NOTE — CARE COORDINATION
Care Transitions Outreach Attempt    Call within 2 business days of discharge: No   Attempt #2 to make contact with Bruna Ramos for an f/u call without success. Unable to leave a message regarding intent of call and call back information. Will discharge from [facility] services at this time due to inability to make contact. Patient: Bebeto Paris Patient : 1940 MRN: [id number]    Last Discharge  Street       Date Complaint Diagnosis Description Type Department Provider    23 Shortness of Breath Acute systolic congestive heart failure ([facility] [address] . ..  ED to Hosp-Admission (Discharged) (ADMITTED) [facility] 5 SURG Thai Cheung MD                  Noted following upcoming appointments from discharge chart review:   [facility] follow up appointment(s):   Future Appointments   Date Time Provider Emeka Gonzalez   2023  9:30 AM [facility] NUC MED RM 1 [facility] NUC MED [facility] Hos Rad   2023  2:00 PM MARTY Millan [facility] Card[facility]     Non-[facility] follow up appointment(s):

## 2023-05-04 NOTE — TELEPHONE ENCOUNTER
Ruddy Wolfe said she is doing very well. She has only gained 2 lbs since she left the hospital. She has no issues with soa or edema that she notices. She did say that when she first started her diuretic she was putting urine out all the time and that does seem to have slowed down. Reviewed daily wts, which she said she does do. And when to call the office. Also instructed her that if she would feel her output significantly is lower than usual to call the office because they may want to check some labs. She voiced understanding.

## 2023-06-23 ENCOUNTER — HOSPITAL ENCOUNTER (OUTPATIENT)
Dept: NUCLEAR MEDICINE | Age: 83
Discharge: HOME | End: 2023-06-25
Payer: MEDICARE

## 2023-06-23 DIAGNOSIS — E78.5 DYSLIPIDEMIA: ICD-10-CM

## 2023-06-23 DIAGNOSIS — I50.21 ACUTE SYSTOLIC CONGESTIVE HEART FAILURE (HCC): ICD-10-CM

## 2023-06-23 DIAGNOSIS — I25.10 CORONARY ARTERY DISEASE INVOLVING NATIVE CORONARY ARTERY OF NATIVE HEART WITHOUT ANGINA PECTORIS: ICD-10-CM

## 2023-06-23 LAB
LV EF: 36 %
LVEF MODALITY: NORMAL

## 2023-06-23 PROCEDURE — A9502 TC99M TETROFOSMIN: HCPCS | Performed by: CLINICAL NURSE SPECIALIST

## 2023-06-23 PROCEDURE — 3430000000 HC RX DIAGNOSTIC RADIOPHARMACEUTICAL: Performed by: CLINICAL NURSE SPECIALIST

## 2023-06-23 PROCEDURE — 78452 HT MUSCLE IMAGE SPECT MULT: CPT | Performed by: INTERNAL MEDICINE

## 2023-06-23 PROCEDURE — 6360000002 HC RX W HCPCS: Performed by: CLINICAL NURSE SPECIALIST

## 2023-06-23 PROCEDURE — 93017 CV STRESS TEST TRACING ONLY: CPT

## 2023-06-23 RX ORDER — REGADENOSON 0.08 MG/ML
0.4 INJECTION, SOLUTION INTRAVENOUS
Status: COMPLETED | OUTPATIENT
Start: 2023-06-23 | End: 2023-06-23

## 2023-06-23 RX ADMIN — TETROFOSMIN 8 MILLICURIE: 1.38 INJECTION, POWDER, LYOPHILIZED, FOR SOLUTION INTRAVENOUS at 12:06

## 2023-06-23 RX ADMIN — TETROFOSMIN 24 MILLICURIE: 1.38 INJECTION, POWDER, LYOPHILIZED, FOR SOLUTION INTRAVENOUS at 12:06

## 2023-06-23 RX ADMIN — REGADENOSON 0.4 MG: 0.08 INJECTION, SOLUTION INTRAVENOUS at 10:53

## 2023-06-29 ENCOUNTER — OFFICE VISIT (OUTPATIENT)
Dept: CARDIOLOGY CLINIC | Age: 83
End: 2023-06-29
Payer: MEDICARE

## 2023-06-29 VITALS
WEIGHT: 153 LBS | HEIGHT: 64 IN | OXYGEN SATURATION: 98 % | BODY MASS INDEX: 26.12 KG/M2 | DIASTOLIC BLOOD PRESSURE: 68 MMHG | SYSTOLIC BLOOD PRESSURE: 124 MMHG | HEART RATE: 60 BPM

## 2023-06-29 DIAGNOSIS — E78.5 DYSLIPIDEMIA: ICD-10-CM

## 2023-06-29 DIAGNOSIS — I25.10 CORONARY ARTERY DISEASE INVOLVING NATIVE CORONARY ARTERY OF NATIVE HEART WITHOUT ANGINA PECTORIS: Primary | ICD-10-CM

## 2023-06-29 DIAGNOSIS — I10 ESSENTIAL HYPERTENSION: ICD-10-CM

## 2023-06-29 DIAGNOSIS — I50.9 CONGESTIVE HEART FAILURE, UNSPECIFIED HF CHRONICITY, UNSPECIFIED HEART FAILURE TYPE (HCC): ICD-10-CM

## 2023-06-29 PROCEDURE — G8417 CALC BMI ABV UP PARAM F/U: HCPCS | Performed by: CLINICAL NURSE SPECIALIST

## 2023-06-29 PROCEDURE — 99214 OFFICE O/P EST MOD 30 MIN: CPT | Performed by: CLINICAL NURSE SPECIALIST

## 2023-06-29 PROCEDURE — 1036F TOBACCO NON-USER: CPT | Performed by: CLINICAL NURSE SPECIALIST

## 2023-06-29 PROCEDURE — G8400 PT W/DXA NO RESULTS DOC: HCPCS | Performed by: CLINICAL NURSE SPECIALIST

## 2023-06-29 PROCEDURE — 3074F SYST BP LT 130 MM HG: CPT | Performed by: CLINICAL NURSE SPECIALIST

## 2023-06-29 PROCEDURE — 3078F DIAST BP <80 MM HG: CPT | Performed by: CLINICAL NURSE SPECIALIST

## 2023-06-29 PROCEDURE — G8427 DOCREV CUR MEDS BY ELIG CLIN: HCPCS | Performed by: CLINICAL NURSE SPECIALIST

## 2023-06-29 PROCEDURE — 1123F ACP DISCUSS/DSCN MKR DOCD: CPT | Performed by: CLINICAL NURSE SPECIALIST

## 2023-06-29 PROCEDURE — 1090F PRES/ABSN URINE INCON ASSESS: CPT | Performed by: CLINICAL NURSE SPECIALIST

## 2023-07-05 ENCOUNTER — TELEPHONE (OUTPATIENT)
Dept: CARDIOLOGY CLINIC | Age: 83
End: 2023-07-05

## 2023-07-05 NOTE — TELEPHONE ENCOUNTER
----- Message from MARTY Ronquillo sent at 6/30/2023 12:27 PM CDT -----  Please call patient on Wednesday. Tell her I finally got the labs to review from her primary care. Because of decreased heart function would like to get her on a heart failure medicine Entresto. I sent a prescription into her pharmacy. Its the lower dose and she will take it twice a day. Once she starts this medication she will take her Lasix only if she needs it for fluid retention    A week after starting this medication she needs to check a BMP and a BNP. She can get this labs checked here or at local clinic. We will put order on your desk if she wants it mailed to her    Continue daily weights.   She will need to follow-up in 4 weeks    Thanks, Indira Dobbins

## 2023-07-13 DIAGNOSIS — I50.9 CONGESTIVE HEART FAILURE, UNSPECIFIED HF CHRONICITY, UNSPECIFIED HEART FAILURE TYPE (HCC): ICD-10-CM

## 2023-07-13 LAB
ANION GAP SERPL CALCULATED.3IONS-SCNC: 8 MMOL/L (ref 7–19)
BNP BLD-MCNC: 3989 PG/ML (ref 0–449)
BUN SERPL-MCNC: 25 MG/DL (ref 8–23)
CALCIUM SERPL-MCNC: 9.5 MG/DL (ref 8.8–10.2)
CHLORIDE SERPL-SCNC: 104 MMOL/L (ref 98–111)
CO2 SERPL-SCNC: 28 MMOL/L (ref 22–29)
CREAT SERPL-MCNC: 1.3 MG/DL (ref 0.5–0.9)
GLUCOSE SERPL-MCNC: 116 MG/DL (ref 74–109)
POTASSIUM SERPL-SCNC: 4.4 MMOL/L (ref 3.5–5)
SODIUM SERPL-SCNC: 140 MMOL/L (ref 136–145)

## 2023-08-24 ENCOUNTER — OFFICE VISIT (OUTPATIENT)
Dept: GASTROENTEROLOGY | Age: 83
End: 2023-08-24
Payer: MEDICARE

## 2023-08-24 VITALS
DIASTOLIC BLOOD PRESSURE: 70 MMHG | BODY MASS INDEX: 26.16 KG/M2 | HEIGHT: 65 IN | SYSTOLIC BLOOD PRESSURE: 120 MMHG | OXYGEN SATURATION: 96 % | WEIGHT: 157 LBS

## 2023-08-24 DIAGNOSIS — D64.9 ANEMIA, UNSPECIFIED TYPE: Primary | ICD-10-CM

## 2023-08-24 DIAGNOSIS — K21.9 CHRONIC GERD: ICD-10-CM

## 2023-08-24 DIAGNOSIS — R13.19 ESOPHAGEAL DYSPHAGIA: ICD-10-CM

## 2023-08-24 PROCEDURE — 1036F TOBACCO NON-USER: CPT | Performed by: NURSE PRACTITIONER

## 2023-08-24 PROCEDURE — 99214 OFFICE O/P EST MOD 30 MIN: CPT | Performed by: NURSE PRACTITIONER

## 2023-08-24 PROCEDURE — 1090F PRES/ABSN URINE INCON ASSESS: CPT | Performed by: NURSE PRACTITIONER

## 2023-08-24 PROCEDURE — G8417 CALC BMI ABV UP PARAM F/U: HCPCS | Performed by: NURSE PRACTITIONER

## 2023-08-24 PROCEDURE — 1123F ACP DISCUSS/DSCN MKR DOCD: CPT | Performed by: NURSE PRACTITIONER

## 2023-08-24 PROCEDURE — G8427 DOCREV CUR MEDS BY ELIG CLIN: HCPCS | Performed by: NURSE PRACTITIONER

## 2023-08-24 PROCEDURE — G8400 PT W/DXA NO RESULTS DOC: HCPCS | Performed by: NURSE PRACTITIONER

## 2023-08-24 RX ORDER — CELECOXIB 200 MG/1
200 CAPSULE ORAL DAILY
COMMUNITY

## 2023-08-24 NOTE — PROGRESS NOTES
Subjective:     Patient ID: Katherine Lopez is a 81 y.o. female  PCP: Fady Tan DO  Referring Provider: Hari Vance    HPI  Patient presents to the office today with the following complaints: Anemia    Patient seen in the office today referred for anemia  She denies seeing bright red blood, dark stools or hematemesis. Reports she has had anemia for many years. We discussed the GI workup for anemia ans she declines EGD and Colonoscopy states she does not feel like she is strong enough to do the prep for colonoscopy and she does not want to pursue the EGD either     Reports she is not having any GI issues     Assessment:     1. Anemia, unspecified type  2. Chronic GERD  3. Esophageal dysphagia         Plan:   Follow up as needed   Orders  No orders of the defined types were placed in this encounter. Medications  No orders of the defined types were placed in this encounter. Patient History:     Past Medical History:   Diagnosis Date    Fernandes's esophagus     DDD (degenerative disc disease), lumbar     DM (diabetes mellitus), type 2 ([address])     Duodenal ulcer     E. coli sepsis ([address]) 02/2014    Elevated pancreatic enzyme 2017    Gastritis     GERD (gastroesophageal reflux disease)     Hiatal hernia     History of colon polyps     HTN (hypertension)     Hypercholesterolemia     Hypothyroidism     Osteoarthritis     other     Non Specified Ulcers    Palliative care patient 04/06/2023    Pernicious anemia     Skin cancer     History OF. Venous angioma     left-parietal       Past Surgical History:   Procedure Laterality Date    BREAST SURGERY      BG Biopsy    CHOLECYSTECTOMY, OPEN      COLONOSCOPY  03/16/2005    DR. OTERO    COLONOSCOPY  05/21/2010    tubular adenoma    COLONOSCOPY  12/31/2015    Dr Cj Lopez, normal, 5 yr recall    FRACTURE SURGERY      2002 performed and 2003 pins removed, then had a replacement in 2018    HAND SURGERY Right 07/2012    Dr Hebert Hernández

## 2023-08-25 ASSESSMENT — ENCOUNTER SYMPTOMS
ABDOMINAL PAIN: 0
SHORTNESS OF BREATH: 0
TROUBLE SWALLOWING: 0
BLOOD IN STOOL: 0
NAUSEA: 0
ANAL BLEEDING: 0
CHOKING: 0
VOMITING: 0
CONSTIPATION: 0
COUGH: 0
ABDOMINAL DISTENTION: 0
DIARRHEA: 0
RECTAL PAIN: 0

## 2023-09-15 RX ORDER — CARVEDILOL 3.12 MG/1
3.12 TABLET ORAL 2 TIMES DAILY WITH MEALS
Qty: 180 TABLET | Refills: 3 | Status: SHIPPED | OUTPATIENT
Start: 2023-09-15

## 2023-09-28 RX ORDER — SACUBITRIL AND VALSARTAN 24; 26 MG/1; MG/1
1 TABLET, FILM COATED ORAL 2 TIMES DAILY
Qty: 60 TABLET | Refills: 2 | Status: SHIPPED | OUTPATIENT
Start: 2023-09-28

## 2024-04-15 ENCOUNTER — TELEPHONE (OUTPATIENT)
Dept: CARDIOLOGY CLINIC | Age: 84
End: 2024-04-15

## 2024-04-15 NOTE — TELEPHONE ENCOUNTER
Antoinette with [facility] called to schedule an appointment with   Dr. Kristian Vargas . Pt has not seen provider requested. [facility] was unable to accommodate in the time frame needed. Please be advised that the best time to call Anytime. Please call pt to schedule    Thank you.

## 2024-04-16 NOTE — TELEPHONE ENCOUNTER
No Answer; Unable to contact pt to schedule her appt with Dr. Weaver.  If pt calls back, please transfer directly to the office at [phone]

## 2024-05-01 ENCOUNTER — OFFICE VISIT (OUTPATIENT)
Dept: CARDIOLOGY CLINIC | Age: 84
End: 2024-05-01
Payer: MEDICARE

## 2024-05-01 VITALS
DIASTOLIC BLOOD PRESSURE: 78 MMHG | SYSTOLIC BLOOD PRESSURE: 118 MMHG | BODY MASS INDEX: 27.83 KG/M2 | WEIGHT: 163 LBS | HEART RATE: 61 BPM | HEIGHT: 64 IN

## 2024-05-01 DIAGNOSIS — I25.10 CORONARY ARTERY DISEASE INVOLVING NATIVE CORONARY ARTERY OF NATIVE HEART WITHOUT ANGINA PECTORIS: ICD-10-CM

## 2024-05-01 DIAGNOSIS — I50.22 CHF (CONGESTIVE HEART FAILURE), NYHA CLASS II, CHRONIC, SYSTOLIC (HCC): Primary | ICD-10-CM

## 2024-05-01 DIAGNOSIS — I10 ESSENTIAL HYPERTENSION: ICD-10-CM

## 2024-05-01 DIAGNOSIS — E78.5 DYSLIPIDEMIA: ICD-10-CM

## 2024-05-01 PROCEDURE — 3078F DIAST BP <80 MM HG: CPT | Performed by: CLINICAL NURSE SPECIALIST

## 2024-05-01 PROCEDURE — G8399 PT W/DXA RESULTS DOCUMENT: HCPCS | Performed by: CLINICAL NURSE SPECIALIST

## 2024-05-01 PROCEDURE — 1036F TOBACCO NON-USER: CPT | Performed by: CLINICAL NURSE SPECIALIST

## 2024-05-01 PROCEDURE — 1090F PRES/ABSN URINE INCON ASSESS: CPT | Performed by: CLINICAL NURSE SPECIALIST

## 2024-05-01 PROCEDURE — G8427 DOCREV CUR MEDS BY ELIG CLIN: HCPCS | Performed by: CLINICAL NURSE SPECIALIST

## 2024-05-01 PROCEDURE — 99214 OFFICE O/P EST MOD 30 MIN: CPT | Performed by: CLINICAL NURSE SPECIALIST

## 2024-05-01 PROCEDURE — 1123F ACP DISCUSS/DSCN MKR DOCD: CPT | Performed by: CLINICAL NURSE SPECIALIST

## 2024-05-01 PROCEDURE — G8417 CALC BMI ABV UP PARAM F/U: HCPCS | Performed by: CLINICAL NURSE SPECIALIST

## 2024-05-01 PROCEDURE — 93000 ELECTROCARDIOGRAM COMPLETE: CPT | Performed by: CLINICAL NURSE SPECIALIST

## 2024-05-01 PROCEDURE — 3074F SYST BP LT 130 MM HG: CPT | Performed by: CLINICAL NURSE SPECIALIST

## 2024-05-01 RX ORDER — GLUCOSAMINE/D3/BOSWELLIA SERRA 1500MG-400
TABLET ORAL
COMMUNITY

## 2024-05-01 RX ORDER — DAPAGLIFLOZIN 5 MG/1
5 TABLET, FILM COATED ORAL EVERY MORNING
COMMUNITY

## 2024-05-01 NOTE — PROGRESS NOTES
[facility] Card[facility]  [address] [address]  Phone: [phone]  Fax: [phone]    OFFICE VISIT:  2024    Jarred ARENAS Severns - : 1940    Reason For Visit:  Jarred is a 83 y.o. female who is here for 3 Month Follow-Up (No symptoms), Coronary Artery Disease, and Hyperlipidemia  History of hypertension, hyperlipidemia and coronary disease with inferior wall MI in  receiving drug-eluting stent to the mid RCA    Patient underwent repeat heart catheterization May 2022 that showed patent stents in RCA with no other significant disease  Was admitted to hospital 2022 transferred from [facility] due to concerns of CVA.  no acute findings.  Patient was discharged home     2023 patient was admitted to the hospital with ongoing exertional dyspnea and orthopnea.  Patient was diuresed.  2D echo showed LV dysfunction with EF dropping to 30 to 35%.  Carvedilol was added and lisinopril held due to CONNER    Patient was seen in follow-up in April back down to baseline weight and feeling better.  At that time patient was lower than baseline weight.  Diuretic decreased    Patient has some intermittent chest and neck discomfort.  Underwent nuclear stress test 2023 that showed large inferior and apical infarct with no new ischemia.  EF 36%    She was initiated on Entresto for heart failure management last .  Her follow-up labs were stable and tolerating.  She returns today for follow-up.  She missed her previous follow-up and then they went to [place] for 3 months for the winter    She reports while being in [place] she started having more shortness of breath with minimal activity and some orthopnea.  Not noticed any fluid retention in her legs but she has had weight gain.  She saw her primary care doctor when she got back.  She was started on Farxiga and had labs.  She was told to take her Lasix only every other day due to her CKD        Subjective  Jarred denies exertional

## 2024-05-01 NOTE — PATIENT INSTRUCTIONS
Will get labs from DR Salomon  Continue the same medications but take the lasix every day for 3 days then go back to every other day  Weigh daily:  Learn what your \"dry\" or \"ideal\" weight is - Dry weight is your weight without extra water (fluid).  Weigh yourself at the same time each day, preferably in the morning, in similar clothing, after urinating but before eating, and on the same scale.  Record your weight in a diary or calendar.  If you gain two pounds in a day or three pounds in two days, double your water pill until you are back down to your dry weight.    Maintain good blood pressure control-goal<130/80 at rest  Maintain good cholesterol control LDL goal<70 with arterial disease  If you are diabetic work to keep/obtain hemoglobin A1c< 7    Follow up in 1 mos after echo   Call with any questions or concerns  Follow up with Ciara Salomon, DO for non cardiac problems  Report any new problems  Cardiovascular Fitness-Exercise as tolerated.  Strive for 30 minutes of exercise most days of the week.    Cardiac / Healthy Diet- Avoid processed high fat foods, maintain low sodium/salt   Continue current medications as directed  Continue plan of treatment  It is always recommended that you bring your medications bottles with you to each visit - this is for your safety!

## 2024-05-16 ENCOUNTER — APPOINTMENT (OUTPATIENT)
Dept: GENERAL RADIOLOGY | Age: 84
End: 2024-05-16
Payer: MEDICARE

## 2024-05-16 ENCOUNTER — HOSPITAL ENCOUNTER (EMERGENCY)
Age: 84
Discharge: HOME | End: 2024-05-16
Attending: EMERGENCY MEDICINE
Payer: MEDICARE

## 2024-05-16 VITALS
BODY MASS INDEX: 27.81 KG/M2 | TEMPERATURE: 97.6 F | RESPIRATION RATE: 16 BRPM | HEART RATE: 66 BPM | WEIGHT: 162 LBS | SYSTOLIC BLOOD PRESSURE: 108 MMHG | DIASTOLIC BLOOD PRESSURE: 64 MMHG | OXYGEN SATURATION: 100 %

## 2024-05-16 DIAGNOSIS — R53.1 GENERALIZED WEAKNESS: Primary | ICD-10-CM

## 2024-05-16 LAB
ALBUMIN SERPL-MCNC: 4.2 G/DL (ref 3.5–5.2)
ALP SERPL-CCNC: 107 U/L (ref 35–104)
ALT SERPL-CCNC: 9 U/L (ref 5–33)
ANION GAP SERPL CALCULATED.3IONS-SCNC: 17 MMOL/L (ref 7–19)
AST SERPL-CCNC: 12 U/L (ref 5–32)
BACTERIA URNS QL MICRO: NEGATIVE /HPF
BASOPHILS # BLD: 0.1 K/UL (ref 0–0.2)
BASOPHILS NFR BLD: 0.9 % (ref 0–1)
BILIRUB SERPL-MCNC: 1.7 MG/DL (ref 0.2–1.2)
BILIRUB UR QL STRIP: NEGATIVE
BNP BLD-MCNC: 1062 PG/ML (ref 0–449)
BUN SERPL-MCNC: 34 MG/DL (ref 8–23)
CALCIUM SERPL-MCNC: 9.4 MG/DL (ref 8.8–10.2)
CHLORIDE SERPL-SCNC: 97 MMOL/L (ref 98–111)
CLARITY UR: CLEAR
CO2 SERPL-SCNC: 23 MMOL/L (ref 22–29)
COLOR UR: YELLOW
CREAT SERPL-MCNC: 1.7 MG/DL (ref 0.5–0.9)
CRYSTALS URNS MICRO: NORMAL /HPF
EOSINOPHIL # BLD: 0.3 K/UL (ref 0–0.6)
EOSINOPHIL NFR BLD: 3.3 % (ref 0–5)
EPI CELLS #/AREA URNS AUTO: 2 /HPF (ref 0–5)
ERYTHROCYTE [DISTWIDTH] IN BLOOD BY AUTOMATED COUNT: 11.7 % (ref 11.5–14.5)
GLUCOSE SERPL-MCNC: 255 MG/DL (ref 74–109)
GLUCOSE UR STRIP.AUTO-MCNC: =>1000 MG/DL
HCT VFR BLD AUTO: 36.7 % (ref 37–47)
HGB BLD-MCNC: 11.7 G/DL (ref 12–16)
HGB UR STRIP.AUTO-MCNC: NEGATIVE MG/L
HYALINE CASTS #/AREA URNS AUTO: 5 /HPF (ref 0–8)
IMM GRANULOCYTES # BLD: 0 K/UL
KETONES UR STRIP.AUTO-MCNC: NEGATIVE MG/DL
LEUKOCYTE ESTERASE UR QL STRIP.AUTO: ABNORMAL
LYMPHOCYTES # BLD: 2 K/UL (ref 1.1–4.5)
LYMPHOCYTES NFR BLD: 23.4 % (ref 20–40)
MCH RBC QN AUTO: 27.4 PG (ref 27–31)
MCHC RBC AUTO-ENTMCNC: 31.9 G/DL (ref 33–37)
MCV RBC AUTO: 85.9 FL (ref 81–99)
MONOCYTES # BLD: 0.5 K/UL (ref 0–0.9)
MONOCYTES NFR BLD: 5.6 % (ref 0–10)
NEUTROPHILS # BLD: 5.6 K/UL (ref 1.5–7.5)
NEUTS SEG NFR BLD: 66.3 % (ref 50–65)
NITRITE UR QL STRIP.AUTO: NEGATIVE
PH UR STRIP.AUTO: 5.5 [PH] (ref 5–8)
PLATELET # BLD AUTO: 233 K/UL (ref 130–400)
PMV BLD AUTO: 10.4 FL (ref 9.4–12.3)
POTASSIUM SERPL-SCNC: 3.3 MMOL/L (ref 3.5–5)
PROT SERPL-MCNC: 7.2 G/DL (ref 6.6–8.7)
PROT UR STRIP.AUTO-MCNC: NEGATIVE MG/DL
RBC # BLD AUTO: 4.27 M/UL (ref 4.2–5.4)
RBC #/AREA URNS AUTO: 1 /HPF (ref 0–4)
SODIUM SERPL-SCNC: 137 MMOL/L (ref 136–145)
SP GR UR STRIP.AUTO: 1.01 (ref 1–1.03)
T4 FREE SERPL-MCNC: 2.69 NG/DL (ref 0.93–1.7)
TROPONIN, HIGH SENSITIVITY: 8 NG/L (ref 0–14)
TROPONIN, HIGH SENSITIVITY: 9 NG/L (ref 0–14)
TSH SERPL DL<=0.005 MIU/L-ACNC: 0.04 UIU/ML (ref 0.27–4.2)
UROBILINOGEN UR STRIP.AUTO-MCNC: 0.2 E.U./DL
WBC # BLD AUTO: 8.5 K/UL (ref 4.8–10.8)
WBC #/AREA URNS AUTO: 3 /HPF (ref 0–5)

## 2024-05-16 PROCEDURE — 81001 URINALYSIS AUTO W/SCOPE: CPT

## 2024-05-16 PROCEDURE — 71045 X-RAY EXAM CHEST 1 VIEW: CPT

## 2024-05-16 PROCEDURE — 80053 COMPREHEN METABOLIC PANEL: CPT

## 2024-05-16 PROCEDURE — 36415 COLL VENOUS BLD VENIPUNCTURE: CPT

## 2024-05-16 PROCEDURE — 85025 COMPLETE CBC W/AUTO DIFF WBC: CPT

## 2024-05-16 PROCEDURE — 99285 EMERGENCY DEPT VISIT HI MDM: CPT

## 2024-05-16 PROCEDURE — 83880 ASSAY OF NATRIURETIC PEPTIDE: CPT

## 2024-05-16 PROCEDURE — 6370000000 HC RX 637 (ALT 250 FOR IP): Performed by: EMERGENCY MEDICINE

## 2024-05-16 PROCEDURE — 84443 ASSAY THYROID STIM HORMONE: CPT

## 2024-05-16 PROCEDURE — 84439 ASSAY OF FREE THYROXINE: CPT

## 2024-05-16 PROCEDURE — 93005 ELECTROCARDIOGRAM TRACING: CPT | Performed by: EMERGENCY MEDICINE

## 2024-05-16 PROCEDURE — 84484 ASSAY OF TROPONIN QUANT: CPT

## 2024-05-16 RX ADMIN — POTASSIUM BICARBONATE 40 MEQ: 782 TABLET, EFFERVESCENT ORAL at 15:25

## 2024-05-16 ASSESSMENT — ENCOUNTER SYMPTOMS
RHINORRHEA: 0
ABDOMINAL PAIN: 0
SORE THROAT: 0
DIARRHEA: 0
NAUSEA: 0
VOMITING: 0
BACK PAIN: 0
SHORTNESS OF BREATH: 1

## 2024-05-16 NOTE — ED PROVIDER NOTES
GASTROINTESTINAL ENDOSCOPY N/A 12/18/2015    Dr Hauser, stricture/ulcers, repeat 8 wks    UPPER GASTROINTESTINAL ENDOSCOPY  12/06/2017    Dr Hauser-w/dilation over wire, [address]-hiatal hernia, distal esophageal stricture    UPPER GASTROINTESTINAL ENDOSCOPY  08/15/2018    Dr SANYA Finley-w/dilation over wire-[address] [place] and EUS-Possible duodenitis, gastritis, duodenal stricture likely peptic stricture nature, hiatal hernia    UPPER GASTROINTESTINAL ENDOSCOPY  08/15/2018    Dr SANYA Finley-w/dilation over wire-[address] [place] and EUS-Possible duodenitis, gastritis, duodenal stricture likely peptic stricture nature, hiatal hernia         CURRENT MEDICATIONS     Discharge Medication List as of 5/16/2024  4:44 PM        CONTINUE these medications which have NOT CHANGED    Details   dapagliflozin (FARXIGA) 5 MG tablet Take 1 tablet by mouth every morningHistorical Med      Biotin 69950 MCG TABS Take by mouthHistorical Med      ENTRESTO 24-26 MG per tablet TAKE ONE TABLET BY MOUTH TWICE A DAY, Disp-60 tablet, R-2Normal      carvedilol (COREG) 3.125 MG tablet Take 1 tablet by mouth 2 times daily (with meals), Disp-180 tablet, R-3Normal      celecoxib (CELEBREX) 200 MG capsule Take 1 capsule by mouth dailyHistorical Med      atorvastatin (LIPITOR) 40 MG tablet Take 1 tablet by mouth nightly, Disp-30 tablet, R-3Normal      furosemide (LASIX) 40 MG tablet Take 1 tablet by mouth daily, Disp-60 tablet, R-3Normal      vitamin D (ERGOCALCIFEROL) 1.25 MG (46696 UT) CAPS capsule Take 1 capsule by mouth once a weekHistorical Med      pantoprazole (PROTONIX) 40 MG tablet Take 1 tablet by mouth dailyHistorical Med      aspirin EC 81 MG EC tablet Take 1 tablet by mouth daily, Disp-90 tablet, R-1Normal      insulin glargine (LANTUS) 100 UNIT/ML injection vial Inject 16 Units into the skin nightlyHistorical Med      levothyroxine (SYNTHROID) 75 MCG tablet Take 1 tablet by mouth DailyHistorical Med             ALLERGIES     Perflutren lipid microsphere,

## 2024-05-18 LAB
EKG P AXIS: 32 DEGREES
EKG P-R INTERVAL: 266 MS
EKG Q-T INTERVAL: 436 MS
EKG QRS DURATION: 92 MS
EKG QTC CALCULATION (BAZETT): 429 MS
EKG T AXIS: 64 DEGREES

## 2024-05-18 PROCEDURE — 93010 ELECTROCARDIOGRAM REPORT: CPT | Performed by: INTERNAL MEDICINE

## 2024-06-03 ENCOUNTER — HOSPITAL ENCOUNTER (OUTPATIENT)
Age: 84
Discharge: HOME | End: 2024-06-05
Payer: MEDICARE

## 2024-06-03 VITALS
DIASTOLIC BLOOD PRESSURE: 46 MMHG | WEIGHT: 163 LBS | BODY MASS INDEX: 27.83 KG/M2 | HEIGHT: 64 IN | SYSTOLIC BLOOD PRESSURE: 113 MMHG

## 2024-06-03 DIAGNOSIS — I50.22 CHF (CONGESTIVE HEART FAILURE), NYHA CLASS II, CHRONIC, SYSTOLIC (HCC): ICD-10-CM

## 2024-06-03 LAB
ECHO AO ROOT DIAM: 1.4 CM
ECHO AO ROOT INDEX: 0.78 CM/M2
ECHO AO SINUS VALSALVA DIAM: 2.5 CM
ECHO AO SINUS VALSALVA INDEX: 1.4 CM/M2
ECHO AO ST JNCT DIAM: 2.4 CM
ECHO AV AREA PEAK VELOCITY: 2 CM2
ECHO AV AREA VTI: 2 CM2
ECHO AV AREA/BSA PEAK VELOCITY: 1.1 CM2/M2
ECHO AV AREA/BSA VTI: 1.1 CM2/M2
ECHO AV MEAN GRADIENT: 4 MMHG
ECHO AV MEAN VELOCITY: 0.9 M/S
ECHO AV PEAK GRADIENT: 6 MMHG
ECHO AV PEAK VELOCITY: 1.3 M/S
ECHO AV REGURGITANT FRACTION: -1162 %
ECHO AV REGURGITANT VOLUME: -707.6 ML
ECHO AV VELOCITY RATIO: 0.62
ECHO AV VTI: 31.2 CM
ECHO BSA: 1.83 M2
ECHO EST RA PRESSURE: 3 MMHG
ECHO IVC PROX: 1.4 CM
ECHO LA AREA 2C: 12.6 CM2
ECHO LA AREA 4C: 14.1 CM2
ECHO LA DIAMETER INDEX: 1.96 CM/M2
ECHO LA DIAMETER: 3.5 CM
ECHO LA MAJOR AXIS: 5 CM
ECHO LA MINOR AXIS: 4.5 CM
ECHO LA TO AORTIC ROOT RATIO: 2.5
ECHO LA VOL BP: 31 ML (ref 22–52)
ECHO LA VOL MOD A2C: 30 ML (ref 22–52)
ECHO LA VOL MOD A4C: 31 ML (ref 22–52)
ECHO LA VOL/BSA BIPLANE: 17 ML/M2 (ref 16–34)
ECHO LA VOLUME INDEX MOD A2C: 17 ML/M2 (ref 16–34)
ECHO LA VOLUME INDEX MOD A4C: 17 ML/M2 (ref 16–34)
ECHO LV E' LATERAL VELOCITY: 5 CM/S
ECHO LV E' SEPTAL VELOCITY: 4 CM/S
ECHO LV EDV A2C: 108 ML
ECHO LV EDV A4C: 90 ML
ECHO LV EDV INDEX A4C: 50 ML/M2
ECHO LV EDV NDEX A2C: 60 ML/M2
ECHO LV EJECTION FRACTION A2C: 41 %
ECHO LV EJECTION FRACTION A4C: 38 %
ECHO LV EJECTION FRACTION BIPLANE: 35 % (ref 55–100)
ECHO LV ESV A2C: 64 ML
ECHO LV ESV A4C: 56 ML
ECHO LV ESV INDEX A2C: 36 ML/M2
ECHO LV ESV INDEX A4C: 31 ML/M2
ECHO LV FRACTIONAL SHORTENING: 25 % (ref 28–44)
ECHO LV INTERNAL DIMENSION DIASTOLE INDEX: 2.68 CM/M2
ECHO LV INTERNAL DIMENSION DIASTOLIC: 4.8 CM (ref 3.9–5.3)
ECHO LV INTERNAL DIMENSION SYSTOLIC INDEX: 2.01 CM/M2
ECHO LV INTERNAL DIMENSION SYSTOLIC: 3.6 CM
ECHO LV ISOVOLUMETRIC RELAXATION TIME (IVRT): 69 MS
ECHO LV IVSD: 1.2 CM (ref 0.6–0.9)
ECHO LV MASS 2D: 232.2 G (ref 67–162)
ECHO LV MASS INDEX 2D: 129.7 G/M2 (ref 43–95)
ECHO LV POSTERIOR WALL DIASTOLIC: 1.3 CM (ref 0.6–0.9)
ECHO LV RELATIVE WALL THICKNESS RATIO: 0.54
ECHO LVOT AREA: 3.1 CM2
ECHO LVOT AV VTI INDEX: 0.62
ECHO LVOT DIAM: 2 CM
ECHO LVOT MEAN GRADIENT: 1 MMHG
ECHO LVOT PEAK GRADIENT: 3 MMHG
ECHO LVOT PEAK VELOCITY: 0.8 M/S
ECHO LVOT STROKE VOLUME INDEX: 34 ML/M2
ECHO LVOT SV: 60.9 ML
ECHO LVOT VTI: 19.4 CM
ECHO MV A VELOCITY: 1.16 M/S
ECHO MV ANNULUS DIAMETER: 2.1 CM
ECHO MV AREA VTI: 1.9 CM2
ECHO MV E DECELERATION TIME (DT): 346 MS
ECHO MV E VELOCITY: 0.72 M/S
ECHO MV E/A RATIO: 0.62
ECHO MV E/E' LATERAL: 14.4
ECHO MV E/E' RATIO (AVERAGED): 16.2
ECHO MV E/E' SEPTAL: 18
ECHO MV LVOT VTI INDEX: 1.65
ECHO MV MAX VELOCITY: 1.2 M/S
ECHO MV MEAN GRADIENT: 2 MMHG
ECHO MV MEAN VELOCITY: 0.7 M/S
ECHO MV PEAK GRADIENT: 6 MMHG
ECHO MV REGURGITANT ALIASING (NYQUIST) VELOCITY: 31 CM/S
ECHO MV REGURGITANT FRACTION CONT EQ: 92 %
ECHO MV REGURGITANT PEAK GRADIENT: 130 MMHG
ECHO MV REGURGITANT PEAK VELOCITY: 5.7 M/S
ECHO MV REGURGITANT RADIUS PISA: 0.8 CM
ECHO MV REGURGITANT VOLUME: 707.61 CM3
ECHO MV REGURGITANT VTIA: 222 CM
ECHO MV VTI: 32 CM
ECHO RA AREA 4C: 8.9 CM2
ECHO RA END SYSTOLIC VOLUME APICAL 4 CHAMBER INDEX BSA: 9 ML/M2
ECHO RA VOLUME: 16 ML
ECHO RIGHT VENTRICULAR SYSTOLIC PRESSURE (RVSP): 25 MMHG
ECHO RV BASAL DIMENSION: 2.3 CM
ECHO RV INTERNAL DIMENSION: 2.6 CM
ECHO RV LONGITUDINAL DIMENSION: 5 CM
ECHO RV MID DIMENSION: 1.8 CM
ECHO RV TAPSE: 2.3 CM (ref 1.7–?)
ECHO TV REGURGITANT MAX VELOCITY: 2.37 M/S
ECHO TV REGURGITANT PEAK GRADIENT: 22 MMHG

## 2024-06-03 PROCEDURE — 93306 TTE W/DOPPLER COMPLETE: CPT

## 2024-06-04 ENCOUNTER — TELEPHONE (OUTPATIENT)
Dept: CARDIOLOGY CLINIC | Age: 84
End: 2024-06-04

## 2024-06-04 NOTE — TELEPHONE ENCOUNTER
----- Message from MARTY Clinton sent at 6/4/2024  8:23 AM CDT -----  Reviewing for Terra.  Patient recent echo shows mild improvement in her LVEF, previously 30-35, not 35 to 40%.  Keep appointment with me next week as scheduled

## 2024-06-12 ENCOUNTER — OFFICE VISIT (OUTPATIENT)
Dept: CARDIOLOGY CLINIC | Age: 84
End: 2024-06-12
Payer: MEDICARE

## 2024-06-12 VITALS
WEIGHT: 166 LBS | BODY MASS INDEX: 28.34 KG/M2 | SYSTOLIC BLOOD PRESSURE: 102 MMHG | HEART RATE: 55 BPM | HEIGHT: 64 IN | OXYGEN SATURATION: 99 % | DIASTOLIC BLOOD PRESSURE: 64 MMHG

## 2024-06-12 DIAGNOSIS — R06.02 SHORTNESS OF BREATH: ICD-10-CM

## 2024-06-12 DIAGNOSIS — R06.02 SHORTNESS OF BREATH: Primary | ICD-10-CM

## 2024-06-12 LAB
ANION GAP SERPL CALCULATED.3IONS-SCNC: 9 MMOL/L (ref 7–19)
BNP BLD-MCNC: 2157 PG/ML (ref 0–449)
BUN SERPL-MCNC: 27 MG/DL (ref 8–23)
CALCIUM SERPL-MCNC: 9.6 MG/DL (ref 8.8–10.2)
CHLORIDE SERPL-SCNC: 98 MMOL/L (ref 98–111)
CO2 SERPL-SCNC: 30 MMOL/L (ref 22–29)
CREAT SERPL-MCNC: 1.5 MG/DL (ref 0.5–0.9)
GLUCOSE SERPL-MCNC: 305 MG/DL (ref 74–109)
POTASSIUM SERPL-SCNC: 4.6 MMOL/L (ref 3.5–5)
SODIUM SERPL-SCNC: 137 MMOL/L (ref 136–145)

## 2024-06-12 PROCEDURE — 99214 OFFICE O/P EST MOD 30 MIN: CPT | Performed by: CLINICAL NURSE SPECIALIST

## 2024-06-12 PROCEDURE — 1036F TOBACCO NON-USER: CPT | Performed by: CLINICAL NURSE SPECIALIST

## 2024-06-12 PROCEDURE — 1123F ACP DISCUSS/DSCN MKR DOCD: CPT | Performed by: CLINICAL NURSE SPECIALIST

## 2024-06-12 PROCEDURE — 1090F PRES/ABSN URINE INCON ASSESS: CPT | Performed by: CLINICAL NURSE SPECIALIST

## 2024-06-12 PROCEDURE — G8399 PT W/DXA RESULTS DOCUMENT: HCPCS | Performed by: CLINICAL NURSE SPECIALIST

## 2024-06-12 PROCEDURE — G8427 DOCREV CUR MEDS BY ELIG CLIN: HCPCS | Performed by: CLINICAL NURSE SPECIALIST

## 2024-06-12 PROCEDURE — G8417 CALC BMI ABV UP PARAM F/U: HCPCS | Performed by: CLINICAL NURSE SPECIALIST

## 2024-06-12 RX ORDER — SPIRONOLACTONE 25 MG/1
25 TABLET ORAL DAILY
Qty: 30 TABLET | Refills: 5 | Status: SHIPPED | OUTPATIENT
Start: 2024-06-12

## 2024-06-12 RX ORDER — FUROSEMIDE 20 MG/1
20 TABLET ORAL DAILY
Qty: 30 TABLET | Refills: 5 | Status: SHIPPED | OUTPATIENT
Start: 2024-06-12

## 2024-06-12 NOTE — PROGRESS NOTES
[facility] Cardiology  [address] [address]  Phone: [phone]  Fax: [phone]    OFFICE VISIT:  2024    Oveda J Severns - : 1940    Reason For Visit:  Jarred is a 83 y.o. female who is here for Follow-up (Patient states she is having ongoing SOB) and CHF (congestive heart failure), NYHA class II, chronic, sys       Diagnosis Orders   1. Shortness of breath  Basic Metabolic Panel    Brain Natriuretic Peptide            HPI  Coronary Artery Disease, and Hyperlipidemia  History of hypertension, hyperlipidemia and coronary disease with inferior wall MI in  receiving drug-eluting stent to the mid RCA     Patient underwent repeat heart catheterization May 2022 that showed patent stents in RCA with no other significant disease  [facility]  no acute findings.  Patient was discharged home     2023 patient was admitted to the hospital with ongoing exertional dyspnea and orthopnea.  Patient was diuresed.  2D echo showed LV dysfunction with EF dropping to 30 to 35%.  Carvedilol was added and lisinopril held due to CONNER.  Entresto initiated in 2023.  She was going to [place] for a period of time and lost to follow-up.  More recently has started Farxiga      Farxiga- dorwsy, took for 1 mo, then yeast  OTC Ciara Guevara DO is PCP.  Oveda J Severns has the following history as recorded in [facility]:    Patient Active Problem List    Diagnosis Date Noted    History of MI (myocardial infarction) 2021    DDD (degenerative disc disease), lumbar     Hypercholesterolemia     DM (diabetes mellitus), type 2 (HCC)     Left-sided cerebrovascular accident (CVA) ([facility]) 10/15/2022    Chest pain 2022    Palliative care patient 2023    Stage 3b chronic kidney disease (CKD) ([facility]) 2023    Congestive heart failure, unspecified HF chronicity, unspecified heart failure

## 2024-06-12 NOTE — PATIENT INSTRUCTIONS
Return in about 1 month (around 7/12/2024) for APRN.  Labs- BNP, BMP  Consider Spironolactone once labs reviewed    - Weigh daily every morning after urinating (this is your dry weight).   Report weight gain of 3lbs or more in 24hrs or 5lbs in one week.  - Call for increasing shortness of breath or increasing swelling in feet and legs.    (This could mean you are retaining too much fluid)  - 2000mg low sodium diet  - Fluid restriction of 1500ml per day (about 6-8 cups (48-64oz) of fluid per day)

## 2024-07-17 ENCOUNTER — HOSPITAL ENCOUNTER (INPATIENT)
Age: 84
LOS: 1 days | Discharge: HOME | DRG: 303 | End: 2024-07-19
Attending: EMERGENCY MEDICINE | Admitting: HOSPITALIST
Payer: MEDICARE

## 2024-07-17 ENCOUNTER — APPOINTMENT (OUTPATIENT)
Dept: GENERAL RADIOLOGY | Age: 84
DRG: 303 | End: 2024-07-17
Payer: MEDICARE

## 2024-07-17 ENCOUNTER — OFFICE VISIT (OUTPATIENT)
Dept: CARDIOLOGY CLINIC | Age: 84
End: 2024-07-17
Payer: MEDICARE

## 2024-07-17 VITALS
OXYGEN SATURATION: 100 % | SYSTOLIC BLOOD PRESSURE: 122 MMHG | HEART RATE: 65 BPM | WEIGHT: 163 LBS | BODY MASS INDEX: 27.83 KG/M2 | HEIGHT: 64 IN | DIASTOLIC BLOOD PRESSURE: 70 MMHG

## 2024-07-17 DIAGNOSIS — R06.02 SHORTNESS OF BREATH: ICD-10-CM

## 2024-07-17 DIAGNOSIS — I25.10 CORONARY ARTERY DISEASE INVOLVING NATIVE CORONARY ARTERY OF NATIVE HEART WITHOUT ANGINA PECTORIS: ICD-10-CM

## 2024-07-17 DIAGNOSIS — R07.9 CHEST PAIN, UNSPECIFIED TYPE: Primary | ICD-10-CM

## 2024-07-17 DIAGNOSIS — R07.89 OTHER CHEST PAIN: Primary | ICD-10-CM

## 2024-07-17 DIAGNOSIS — I50.9 CONGESTIVE HEART FAILURE, UNSPECIFIED HF CHRONICITY, UNSPECIFIED HEART FAILURE TYPE (HCC): ICD-10-CM

## 2024-07-17 DIAGNOSIS — I50.22 CHRONIC SYSTOLIC CONGESTIVE HEART FAILURE (HCC): ICD-10-CM

## 2024-07-17 PROBLEM — I20.0 UNSTABLE ANGINA (HCC): Status: ACTIVE | Noted: 2024-07-17

## 2024-07-17 PROBLEM — R07.2 SUBSTERNAL CHEST PAIN RELIEVED BY NITROGLYCERIN: Status: ACTIVE | Noted: 2024-07-17

## 2024-07-17 LAB
ALBUMIN SERPL-MCNC: 4.4 G/DL (ref 3.5–5.2)
ALP SERPL-CCNC: 129 U/L (ref 35–104)
ALT SERPL-CCNC: 14 U/L (ref 5–33)
ANION GAP SERPL CALCULATED.3IONS-SCNC: 13 MMOL/L (ref 7–19)
AST SERPL-CCNC: 13 U/L (ref 5–32)
BASOPHILS # BLD: 0.1 K/UL (ref 0–0.2)
BASOPHILS NFR BLD: 0.6 % (ref 0–1)
BILIRUB SERPL-MCNC: 1.2 MG/DL (ref 0.2–1.2)
BUN SERPL-MCNC: 32 MG/DL (ref 8–23)
CALCIUM SERPL-MCNC: 9.9 MG/DL (ref 8.8–10.2)
CHLORIDE SERPL-SCNC: 95 MMOL/L (ref 98–111)
CO2 SERPL-SCNC: 29 MMOL/L (ref 22–29)
CREAT SERPL-MCNC: 1.5 MG/DL (ref 0.5–0.9)
EOSINOPHIL # BLD: 0.3 K/UL (ref 0–0.6)
EOSINOPHIL NFR BLD: 3.3 % (ref 0–5)
ERYTHROCYTE [DISTWIDTH] IN BLOOD BY AUTOMATED COUNT: 11.6 % (ref 11.5–14.5)
GLUCOSE BLD-MCNC: 112 MG/DL (ref 70–99)
GLUCOSE SERPL-MCNC: 178 MG/DL (ref 74–109)
HBA1C MFR BLD: 9.2 % (ref 4–6)
HCT VFR BLD AUTO: 37.2 % (ref 37–47)
HGB BLD-MCNC: 11.8 G/DL (ref 12–16)
IMM GRANULOCYTES # BLD: 0 K/UL
LYMPHOCYTES # BLD: 2.4 K/UL (ref 1.1–4.5)
LYMPHOCYTES NFR BLD: 28.9 % (ref 20–40)
MCH RBC QN AUTO: 26.6 PG (ref 27–31)
MCHC RBC AUTO-ENTMCNC: 31.7 G/DL (ref 33–37)
MCV RBC AUTO: 84 FL (ref 81–99)
MONOCYTES # BLD: 0.5 K/UL (ref 0–0.9)
MONOCYTES NFR BLD: 6.2 % (ref 0–10)
NEUTROPHILS # BLD: 5 K/UL (ref 1.5–7.5)
NEUTS SEG NFR BLD: 60.8 % (ref 50–65)
PERFORMED ON: ABNORMAL
PLATELET # BLD AUTO: 251 K/UL (ref 130–400)
PMV BLD AUTO: 9.6 FL (ref 9.4–12.3)
POTASSIUM SERPL-SCNC: 4.4 MMOL/L (ref 3.5–5)
PROT SERPL-MCNC: 7.7 G/DL (ref 6.6–8.7)
RBC # BLD AUTO: 4.43 M/UL (ref 4.2–5.4)
SODIUM SERPL-SCNC: 137 MMOL/L (ref 136–145)
TROPONIN, HIGH SENSITIVITY: 17 NG/L (ref 0–14)
TROPONIN, HIGH SENSITIVITY: 21 NG/L (ref 0–14)
TROPONIN, HIGH SENSITIVITY: 22 NG/L (ref 0–14)
TROPONIN, HIGH SENSITIVITY: 23 NG/L (ref 0–14)
TSH SERPL DL<=0.005 MIU/L-ACNC: 0.63 UIU/ML (ref 0.27–4.2)
WBC # BLD AUTO: 8.2 K/UL (ref 4.8–10.8)

## 2024-07-17 PROCEDURE — G0378 HOSPITAL OBSERVATION PER HR: HCPCS

## 2024-07-17 PROCEDURE — G8417 CALC BMI ABV UP PARAM F/U: HCPCS | Performed by: CLINICAL NURSE SPECIALIST

## 2024-07-17 PROCEDURE — 82962 GLUCOSE BLOOD TEST: CPT

## 2024-07-17 PROCEDURE — 36415 COLL VENOUS BLD VENIPUNCTURE: CPT

## 2024-07-17 PROCEDURE — 96372 THER/PROPH/DIAG INJ SC/IM: CPT

## 2024-07-17 PROCEDURE — 99213 OFFICE O/P EST LOW 20 MIN: CPT | Performed by: CLINICAL NURSE SPECIALIST

## 2024-07-17 PROCEDURE — 80053 COMPREHEN METABOLIC PANEL: CPT

## 2024-07-17 PROCEDURE — 6360000002 HC RX W HCPCS: Performed by: NURSE PRACTITIONER

## 2024-07-17 PROCEDURE — 6370000000 HC RX 637 (ALT 250 FOR IP): Performed by: EMERGENCY MEDICINE

## 2024-07-17 PROCEDURE — 1090F PRES/ABSN URINE INCON ASSESS: CPT | Performed by: CLINICAL NURSE SPECIALIST

## 2024-07-17 PROCEDURE — 83036 HEMOGLOBIN GLYCOSYLATED A1C: CPT

## 2024-07-17 PROCEDURE — 1036F TOBACCO NON-USER: CPT | Performed by: CLINICAL NURSE SPECIALIST

## 2024-07-17 PROCEDURE — 85025 COMPLETE CBC W/AUTO DIFF WBC: CPT

## 2024-07-17 PROCEDURE — 84443 ASSAY THYROID STIM HORMONE: CPT

## 2024-07-17 PROCEDURE — G8399 PT W/DXA RESULTS DOCUMENT: HCPCS | Performed by: CLINICAL NURSE SPECIALIST

## 2024-07-17 PROCEDURE — 71045 X-RAY EXAM CHEST 1 VIEW: CPT

## 2024-07-17 PROCEDURE — 93005 ELECTROCARDIOGRAM TRACING: CPT | Performed by: EMERGENCY MEDICINE

## 2024-07-17 PROCEDURE — 84484 ASSAY OF TROPONIN QUANT: CPT

## 2024-07-17 PROCEDURE — 2580000003 HC RX 258: Performed by: NURSE PRACTITIONER

## 2024-07-17 PROCEDURE — G8427 DOCREV CUR MEDS BY ELIG CLIN: HCPCS | Performed by: CLINICAL NURSE SPECIALIST

## 2024-07-17 PROCEDURE — 99285 EMERGENCY DEPT VISIT HI MDM: CPT

## 2024-07-17 PROCEDURE — 1123F ACP DISCUSS/DSCN MKR DOCD: CPT | Performed by: CLINICAL NURSE SPECIALIST

## 2024-07-17 RX ORDER — INSULIN LISPRO 100 [IU]/ML
0-4 INJECTION, SOLUTION INTRAVENOUS; SUBCUTANEOUS
Status: DISCONTINUED | OUTPATIENT
Start: 2024-07-17 | End: 2024-07-19 | Stop reason: HOSPADM

## 2024-07-17 RX ORDER — NITROGLYCERIN 0.4 MG/1
0.4 TABLET SUBLINGUAL EVERY 5 MIN PRN
Status: DISCONTINUED | OUTPATIENT
Start: 2024-07-17 | End: 2024-07-19 | Stop reason: HOSPADM

## 2024-07-17 RX ORDER — SODIUM CHLORIDE 0.9 % (FLUSH) 0.9 %
5-40 SYRINGE (ML) INJECTION EVERY 12 HOURS SCHEDULED
Status: DISCONTINUED | OUTPATIENT
Start: 2024-07-17 | End: 2024-07-19 | Stop reason: HOSPADM

## 2024-07-17 RX ORDER — ONDANSETRON 4 MG/1
4 TABLET, ORALLY DISINTEGRATING ORAL EVERY 8 HOURS PRN
Status: DISCONTINUED | OUTPATIENT
Start: 2024-07-17 | End: 2024-07-19 | Stop reason: HOSPADM

## 2024-07-17 RX ORDER — SODIUM CHLORIDE 9 MG/ML
INJECTION, SOLUTION INTRAVENOUS PRN
Status: DISCONTINUED | OUTPATIENT
Start: 2024-07-17 | End: 2024-07-19 | Stop reason: HOSPADM

## 2024-07-17 RX ORDER — DEXTROSE MONOHYDRATE 100 MG/ML
INJECTION, SOLUTION INTRAVENOUS CONTINUOUS PRN
Status: DISCONTINUED | OUTPATIENT
Start: 2024-07-17 | End: 2024-07-19 | Stop reason: HOSPADM

## 2024-07-17 RX ORDER — ASPIRIN 81 MG/1
324 TABLET, CHEWABLE ORAL ONCE
Status: COMPLETED | OUTPATIENT
Start: 2024-07-17 | End: 2024-07-17

## 2024-07-17 RX ORDER — SODIUM CHLORIDE 0.9 % (FLUSH) 0.9 %
5-40 SYRINGE (ML) INJECTION PRN
Status: DISCONTINUED | OUTPATIENT
Start: 2024-07-17 | End: 2024-07-19 | Stop reason: HOSPADM

## 2024-07-17 RX ORDER — ACETAMINOPHEN 325 MG/1
650 TABLET ORAL EVERY 6 HOURS PRN
Status: DISCONTINUED | OUTPATIENT
Start: 2024-07-17 | End: 2024-07-19 | Stop reason: HOSPADM

## 2024-07-17 RX ORDER — SENNOSIDES A AND B 8.6 MG/1
1 TABLET, FILM COATED ORAL DAILY PRN
Status: DISCONTINUED | OUTPATIENT
Start: 2024-07-17 | End: 2024-07-19 | Stop reason: HOSPADM

## 2024-07-17 RX ORDER — SODIUM CHLORIDE 9 MG/ML
INJECTION, SOLUTION INTRAVENOUS CONTINUOUS
Status: DISCONTINUED | OUTPATIENT
Start: 2024-07-17 | End: 2024-07-19 | Stop reason: HOSPADM

## 2024-07-17 RX ORDER — INSULIN LISPRO 100 [IU]/ML
0-4 INJECTION, SOLUTION INTRAVENOUS; SUBCUTANEOUS NIGHTLY
Status: DISCONTINUED | OUTPATIENT
Start: 2024-07-17 | End: 2024-07-19 | Stop reason: HOSPADM

## 2024-07-17 RX ORDER — ASPIRIN 81 MG/1
81 TABLET, CHEWABLE ORAL DAILY
Status: DISCONTINUED | OUTPATIENT
Start: 2024-07-18 | End: 2024-07-19 | Stop reason: HOSPADM

## 2024-07-17 RX ORDER — ACETAMINOPHEN 650 MG/1
650 SUPPOSITORY RECTAL EVERY 6 HOURS PRN
Status: DISCONTINUED | OUTPATIENT
Start: 2024-07-17 | End: 2024-07-19 | Stop reason: HOSPADM

## 2024-07-17 RX ORDER — ONDANSETRON 2 MG/ML
4 INJECTION INTRAMUSCULAR; INTRAVENOUS EVERY 6 HOURS PRN
Status: DISCONTINUED | OUTPATIENT
Start: 2024-07-17 | End: 2024-07-19 | Stop reason: HOSPADM

## 2024-07-17 RX ORDER — ATORVASTATIN CALCIUM 40 MG/1
40 TABLET, FILM COATED ORAL NIGHTLY
Status: DISCONTINUED | OUTPATIENT
Start: 2024-07-17 | End: 2024-07-19 | Stop reason: HOSPADM

## 2024-07-17 RX ORDER — ENOXAPARIN SODIUM 100 MG/ML
30 INJECTION SUBCUTANEOUS EVERY 24 HOURS
Status: DISCONTINUED | OUTPATIENT
Start: 2024-07-17 | End: 2024-07-19 | Stop reason: HOSPADM

## 2024-07-17 RX ADMIN — SODIUM CHLORIDE: 9 INJECTION, SOLUTION INTRAVENOUS at 22:09

## 2024-07-17 RX ADMIN — SODIUM CHLORIDE, PRESERVATIVE FREE 10 ML: 5 INJECTION INTRAVENOUS at 20:32

## 2024-07-17 RX ADMIN — ASPIRIN 324 MG: 81 TABLET, CHEWABLE ORAL at 11:32

## 2024-07-17 RX ADMIN — ENOXAPARIN SODIUM 30 MG: 100 INJECTION SUBCUTANEOUS at 20:26

## 2024-07-17 ASSESSMENT — ENCOUNTER SYMPTOMS
FACIAL SWELLING: 0
ABDOMINAL PAIN: 0
VOMITING: 0
COUGH: 0
CHEST TIGHTNESS: 1
NAUSEA: 0
WHEEZING: 0
EYE REDNESS: 0
SHORTNESS OF BREATH: 0

## 2024-07-17 NOTE — H&P
furosemide (LASIX) 20 MG tablet Take 1 tablet by mouth daily 6/12/24   Shahla Owen APRN   Biotin 54859 MCG TABS Take by mouth    Chavez Luna MD   ENTRESTO 24-26 MG per tablet TAKE ONE TABLET BY MOUTH TWICE A DAY 9/28/23   Haylee Wellington APRN   carvedilol (COREG) 3.125 MG tablet Take 1 tablet by mouth 2 times daily (with meals) 9/15/23   Erlinda Lee APRN   celecoxib (CELEBREX) 200 MG capsule Take 1 capsule by mouth daily    Chavez Luna MD   atorvastatin (LIPITOR) 40 MG tablet Take 1 tablet by mouth nightly 4/6/23   Margarette Anders MD   vitamin D (ERGOCALCIFEROL) 1.25 MG (26476 UT) CAPS capsule Take 1 capsule by mouth once a week    Chavez Luna MD   pantoprazole (PROTONIX) 40 MG tablet Take 1 tablet by mouth daily    Chavez Luna MD   aspirin EC 81 MG EC tablet Take 1 tablet by mouth daily 7/27/21   Nury Paredes MD   insulin glargine (LANTUS) 100 UNIT/ML injection vial Inject 16 Units into the skin nightly    Chavez Luna MD   levothyroxine (SYNTHROID) 75 MCG tablet Take 1 tablet by mouth Daily    Chavez Luna MD       Allergies:    Perflutren lipid microsphere, Farxiga [dapagliflozin], Januvia [sitagliptin], Metformin and related, and Vancomycin    Social History:    The patient currently lives at home  Tobacco:   reports that she quit smoking about 34 years ago. Her smoking use included cigarettes. She started smoking about 44 years ago. She has a 5.0 pack-year smoking history. She has never used smokeless tobacco.  Alcohol:   reports current alcohol use.  Illicit Drugs: none    Family History:      Problem Relation Age of Onset    Breast Cancer Mother 56    No Known Problems Daughter     Blindness Son         injured in a correction fight, he was a guard    Kidney stones Son     Colon Cancer Neg Hx     Colon Polyps Neg Hx     Esophageal Cancer Neg Hx     Liver Disease Neg Hx     Liver Cancer Neg Hx     Rectal Cancer Neg Hx     Stomach  resolved hospital problems. *     Principal Problem:    Substernal chest pain relieved by nitroglycerin/Unstable angina  -trend serial troponin  -follow ekg  -cardiac meds-asa/statin/ntg sl prn  -cards consult   -telemetry  -npo after midnight  -I's and O's  -daily weight   -tsh with reflex FT4  Active Problems:    Hypercholesterolemia  -lipid panel  -statin    Diabetes  -HgA1c  -poc glucose qid  -low dose insulin coverage  -hypoglycemia orders    Stage 3b chronic kidney disease (CKD) (HCC)  -ns at 75cc/hr  -monitor renal fxn/lytes  -avoid nephrotoxic agents  -I's and O's  -daily weight  Resolved Problems:    * No resolved hospital problems. *  Signed:  MARTY Lentz - CNP, 7/17/2024 5:52 PM

## 2024-07-17 NOTE — ED PROVIDER NOTES
[facility] EMERGENCY DEPT  eMERGENCY dEPARTMENT eNCOUnter      Pt Name: Oveda J Severns  MRN: [id number]  Birthdate 1940  Date of evaluation: 7/17/2024  Provider: Ninfa Reynolsd DO    CHIEF COMPLAINT       Chief Complaint   Patient presents with    Chest Pain     Onset this morning, 1 NTG with relief PTA at cardiologist office    Fatigue         HISTORY OF PRESENT ILLNESS   (Location/Symptom, Timing/Onset,Context/Setting, Quality, Duration, Modifying Factors, Severity)  Note limiting factors.   Oveda J Severns is a 83 y.o. female who presents to the emergency department      Patient is an 83-year-old female who presents the emergency department from her cardiologist office with a complaint of chest pain.  Patient says that the pain was noted this morning with discomfort in her chest and anterior neck with associated shortness of breath.  Pain resolved with 1 SL nitro given at cardiologist office. She says she feels very fatigued.  She has been struggling with fatigue for about a year now.  Patient says symptoms are the same as when she had an MI requiring a coronary artery stent a few years ago.  Her cardiologist is Dr. Weaver.  She denies any dizziness, syncope, nausea, vomiting, cough, fever, chills.  She denies any radiation of pain into the arms or back.  There are no other complaints or concerns at this time.          NursingNotes were reviewed.    REVIEW OF SYSTEMS    (2-9 systems for level 4, 10 or more for level 5)     As per HPI         PAST MEDICALHISTORY     Past Medical History:   Diagnosis Date    Fernandes's esophagus     DDD (degenerative disc disease), lumbar     DM (diabetes mellitus), type 2 (HCC)     Duodenal ulcer     E. coli sepsis (HCC) 02/2014    Elevated pancreatic enzyme 2017    Gastritis     GERD (gastroesophageal reflux disease)     Hiatal hernia     History of colon polyps     HTN (hypertension)     Hypercholesterolemia     Hypothyroidism     Osteoarthritis     other     Non Specified Ulcers  ongoing exertional dyspnea and orthopnea.  Patient was diuresed.  2D echo showed LV dysfunction with EF dropping to 30 to 35%.  Carvedilol was added and lisinopril held due to CONNER.  Entresto initiated in June 2023.  She was going to [place] for a period of time and lost to follow-up.  More recently has started Farxiga     At most recent office visit on 6/12/2024 spironolactone was added to her guideline directed medical therapy for heart failure.  Today's visit is for a follow-up.     She seems to feel significantly worse today.  She has been chronically fatigued for about a year.  She is complaining of pressure in her throat and chest this morning.  She states she has no energy.  She states with any activity she has to take a nap following.  She ran out of her spironolactone a few days ago has not refilled it.     She denies dark tarry stools or bright red blood per rectum    EMERGENCY DEPARTMENT COURSE and DIFFERENTIAL DIAGNOSIS/MDM:   Vitals:    Vitals:    07/17/24 1055   BP: 100/64   Pulse: 71   Resp: 18   Temp: 97.8 °F (36.6 °C)   TempSrc: Oral   SpO2: 94%       MDM  Patient is an 83-year-old female who presents the emergency department from her cardiologist office with a complaint of chest pain.  Patient says that the pain was noted this morning with discomfort in her chest and anterior neck with associated shortness of breath.  Pain resolved with 1 SL nitro given at cardiologist office. She says she feels very fatigued.  She has been struggling with fatigue for about a year now.  Patient says symptoms are the same as when she had an MI requiring a coronary artery stent a few years ago.  Her cardiologist is Dr. Weaver.  She denies any dizziness, syncope, nausea, vomiting, cough, fever, chills.  She denies any radiation of pain into the arms or back.  There are no other complaints or concerns at this time.      Prior records reviewed by me as noted above. I independently reviewed the results of the ED workup.

## 2024-07-17 NOTE — PROGRESS NOTES
[facility] Cardiology  [address] [address]  Phone: [phone]  Fax: [phone]    OFFICE VISIT:  2024    Jarred ARENAS Severns - : 1940    Reason For Visit:  Jarred is a 83 y.o. female who is here for Follow-up (Patient states she has ongoing chest pain and very fatigued.) and Shortness of Breath       Diagnosis Orders   1. Other chest pain        2. Coronary artery disease involving native coronary artery of native heart without angina pectoris        3. Chronic systolic congestive heart failure (HCC)              HPI  Coronary Artery Disease, and Hyperlipidemia  History of hypertension, hyperlipidemia and coronary disease with inferior wall MI in  receiving drug-eluting stent to the mid RCA     Patient underwent repeat heart catheterization May 2022 that showed patent stents in RCA with no other significant disease  Was admitted to hospital 2022 transferred from [facility] due to concerns of CVA.  no acute findings.  Patient was discharged home     2023 patient was admitted to the hospital with ongoing exertional dyspnea and orthopnea.  Patient was diuresed.  2D echo showed LV dysfunction with EF dropping to 30 to 35%.  Carvedilol was added and lisinopril held due to CONNER.  Entresto initiated in 2023.  She was going to [place] for a period of time and lost to follow-up.  More recently has started Farxiga    At most recent office visit on 2024 spironolactone was added to her guideline directed medical therapy for heart failure.  Today's visit is for a follow-up.    She seems to feel significantly worse today.  She has been chronically fatigued for about a year.  She is complaining of pressure in her throat and chest this morning.  She states she has no energy.  She states with any activity she has to take a nap following.  She ran out of her spironolactone a few days ago has not refilled it.    She denies dark tarry stools or bright red blood per

## 2024-07-17 NOTE — ED NOTES
ED TO INPATIENT SBAR HANDOFF    Patient Name: Oveda J Severns   : 1940  83 y.o.   Family/Caregiver Present: Yes  Code Status Order: Prior    C-SSRS: Risk of Suicide: No Risk  Sitter No  Restraints:         Situation  Chief Complaint:   Chief Complaint   Patient presents with    Chest Pain     Onset this morning, 1 NTG with relief PTA at cardiologist office    Fatigue     Patient Diagnosis: Chest pain in adult [R07.9]     Brief Description of Patient's Condition:  presents the emergency department from her cardiologist office with a complaint of chest pain. Patient says that the pain was noted this morning with discomfort in her chest and anterior neck with associated shortness of breath. Pain resolved with 1 SL nitro given at cardiologist office. She says she feels very fatigued. She has been struggling with fatigue for about a year now. Patient says symptoms are the same as when she had an MI requiring a coronary artery stent a few years ago. Her cardiologist is Dr. Weaver. She denies any dizziness, syncope, nausea, vomiting, cough, fever, chills. She denies any radiation of pain into the arms or back. There are no other complaints or concerns at this time.   Mental Status: oriented, alert, coherent, logical, thought processes intact, and able to concentrate and follow conversation  Arrived from: home    Imaging:   XR CHEST PORTABLE   Final Result       No acute radiographic abnormality.               ______________________________________    Electronically signed by: BARBARA ANTON M.D.   Date:     2024   Time:    11:10         COVID-19 Results:   Internal Administration   First Dose COVID-19, MODERNA BLUE border, Primary or Immunocompromised, (age 12y+), IM, 100 mcg/0.5mL  2021   Second Dose COVID-19, MODERNA BLUE border, Primary or Immunocompromised, (age 12y+), IM, 100 mcg/0.5mL   2021       Last COVID Lab SARS-CoV-2, NAAT (no units)   Date Value   2023 Not Detected          Consciousness: Alert (0)   Vitals:    07/17/24 1502 07/17/24 1531 07/17/24 1701 07/17/24 1732   BP: (!) 145/64 122/64 114/66 127/68   Pulse: 59 60 59 63   Resp:       Temp:       TempSrc:       SpO2: 100% 97% 95% 97%     Predictive Model Details          26 (Normal)  Factor Value    Calculated 7/17/2024 17:44 51% Age 83 years old    Deterioration Index Model 27% Respiratory rate 21     9% Potassium 4.4 mmol/L     4% BUN abnormal (32 mg/dL)     4% Systolic 127     4% Sodium 137 mmol/L     1% Pulse 63     1% WBC count 8.2 K/uL     0% Pulse oximetry 97 %     0% Hematocrit 37.2 %     0% Temperature 97.8 °F (36.6 °C)       NPO? No  O2 Flow Rate: O2 Device: None (Room air)    Cardiac Rhythm:    NIH Score: NIH     Active LDA's:   Peripheral IV 07/17/24 Right Antecubital (Active)   Site Assessment Clean, dry & intact 07/17/24 1444   Line Status Blood return noted;Normal saline locked 07/17/24 1444     Pertinent or High Risk Medications/Drips: no   If Yes, please provide details:    Blood Product Administration: no  If Yes, please provide details:    Sepsis Risk Score      Admitted with Sepsis? No    Recommendation  Incomplete orders:    Patient Belongings: With pt  Additional Comments:    If any further questions, please call Sending RN at [phone]    Electronically signed by: Electronically signed by Maria Elena Winchester RN on 7/17/2024 at 5:44 PM

## 2024-07-17 NOTE — CARE COORDINATION
Case Management Assessment  Initial Evaluation    Date/Time of Evaluation: 7/17/2024 2:20 PM  Assessment Completed by: Camryn Caballero    If patient is discharged prior to next notation, then this note serves as note for discharge by case management.    Patient Name: Oveda J Severns                   YOB: 1940  Diagnosis: No admission diagnoses are documented for this encounter.                   Date / Time: 7/17/2024 10:45 AM    Patient Admission Status: Emergency   Readmission Risk (Low < 19, Mod (19-27), High > 27): No data recorded  Current PCP: Ciara Salomon, DO  PCP verified by CM? Yes    Chart Reviewed: Yes      History Provided by: Patient  Patient Orientation: Alert and Oriented    Patient Cognition: Alert    Hospitalization in the last 30 days (Readmission):  No    If yes, Readmission Assessment in CM Navigator will be completed.    Advance Directives:      Code Status: Prior   Patient's Primary Decision Maker is: Legal Next of Kin    Primary Decision Maker: SevernsGabriel - Spouse - [phone]    Secondary Decision Maker: Charla Mcfarlane - Child - [phone]    Secondary Decision Maker: Obed Mcfarlane - Other - [phone]    Discharge Planning:    Patient lives with: Spouse/Significant Other Type of Home: House  Primary Care Giver: Self  Patient Support Systems include: Spouse/Significant Other, Children   Current Financial resources: Medicare  Current community resources: None  Current services prior to admission: None            Current DME:              Type of Home Care services:  None    ADLS  Prior functional level: Independent in ADLs/IADLs  Current functional level: Independent in ADLs/IADLs    PT AM-PAC:   /24  OT AM-PAC:   /24    Family can provide assistance at DC: Yes  Would you like Case Management to discuss the discharge plan with any other family members/significant others, and if so, who? Yes  Plans to Return to Present Housing: Yes  Potential Assistance

## 2024-07-18 ENCOUNTER — APPOINTMENT (OUTPATIENT)
Dept: NUCLEAR MEDICINE | Age: 84
DRG: 303 | End: 2024-07-18
Payer: MEDICARE

## 2024-07-18 ENCOUNTER — HOSPITAL ENCOUNTER (OUTPATIENT)
Age: 84
Setting detail: OBSERVATION
End: 2024-07-18
Attending: INTERNAL MEDICINE
Payer: MEDICARE

## 2024-07-18 VITALS
WEIGHT: 161 LBS | BODY MASS INDEX: 27.49 KG/M2 | HEIGHT: 64 IN | DIASTOLIC BLOOD PRESSURE: 50 MMHG | SYSTOLIC BLOOD PRESSURE: 106 MMHG

## 2024-07-18 LAB
CHOLEST SERPL-MCNC: 124 MG/DL (ref 160–199)
ECHO BSA: 1.82 M2
ECHO IVC PROX: 1.4 CM
ECHO LA AREA 2C: 15.5 CM2
ECHO LA AREA 4C: 17.1 CM2
ECHO LA MAJOR AXIS: 5.4 CM
ECHO LA MINOR AXIS: 5.9 CM
ECHO LA VOL BP: 38 ML (ref 22–52)
ECHO LA VOL MOD A2C: 36 ML (ref 22–52)
ECHO LA VOL MOD A4C: 43 ML (ref 22–52)
ECHO LA VOL/BSA BIPLANE: 21 ML/M2 (ref 16–34)
ECHO LA VOLUME INDEX MOD A2C: 20 ML/M2 (ref 16–34)
ECHO LA VOLUME INDEX MOD A4C: 24 ML/M2 (ref 16–34)
ECHO LV E' LATERAL VELOCITY: 6 CM/S
ECHO LV E' SEPTAL VELOCITY: 6 CM/S
ECHO LV EDV A2C: 69 ML
ECHO LV EDV A4C: 107 ML
ECHO LV EDV INDEX A4C: 60 ML/M2
ECHO LV EDV NDEX A2C: 39 ML/M2
ECHO LV EF PHYSICIAN: 40 %
ECHO LV EJECTION FRACTION A2C: 35 %
ECHO LV EJECTION FRACTION A4C: 36 %
ECHO LV EJECTION FRACTION BIPLANE: 40 % (ref 55–100)
ECHO LV ESV A2C: 45 ML
ECHO LV ESV A4C: 69 ML
ECHO LV ESV INDEX A2C: 25 ML/M2
ECHO LV ESV INDEX A4C: 39 ML/M2
ECHO LV FRACTIONAL SHORTENING: 26 % (ref 28–44)
ECHO LV GLOBAL LONGITUDINAL STRAIN (GLS): -14 %
ECHO LV INTERNAL DIMENSION DIASTOLE INDEX: 2.19 CM/M2
ECHO LV INTERNAL DIMENSION DIASTOLIC: 3.9 CM (ref 3.9–5.3)
ECHO LV INTERNAL DIMENSION SYSTOLIC INDEX: 1.63 CM/M2
ECHO LV INTERNAL DIMENSION SYSTOLIC: 2.9 CM
ECHO LV IVSD: 1.2 CM (ref 0.6–0.9)
ECHO LV MASS 2D: 169.4 G (ref 67–162)
ECHO LV MASS INDEX 2D: 95.1 G/M2 (ref 43–95)
ECHO LV POSTERIOR WALL DIASTOLIC: 1.3 CM (ref 0.6–0.9)
ECHO LV RELATIVE WALL THICKNESS RATIO: 0.67
ECHO MV A VELOCITY: 1.33 M/S
ECHO MV ANNULUS DIAMETER: 2.7 CM
ECHO MV E DECELERATION TIME (DT): 278 MS
ECHO MV E VELOCITY: 0.63 M/S
ECHO MV E/A RATIO: 0.47
ECHO MV E/E' LATERAL: 10.5
ECHO MV E/E' RATIO (AVERAGED): 10.5
ECHO MV E/E' SEPTAL: 10.5
ECHO MV MAX VELOCITY: 1.2 M/S
ECHO MV MEAN GRADIENT: 2 MMHG
ECHO MV MEAN VELOCITY: 0.7 M/S
ECHO MV PEAK GRADIENT: 6 MMHG
ECHO MV REGURGITANT ALIASING (NYQUIST) VELOCITY: 29 CM/S
ECHO MV REGURGITANT PEAK GRADIENT: 74 MMHG
ECHO MV REGURGITANT PEAK VELOCITY: 4.3 M/S
ECHO MV REGURGITANT RADIUS PISA: 0.5 CM
ECHO MV REGURGITANT VTIA: 173 CM
ECHO MV VTI: 34.2 CM
ECHO RA AREA 4C: 6.7 CM2
ECHO RA END SYSTOLIC VOLUME APICAL 4 CHAMBER INDEX BSA: 4 ML/M2
ECHO RA MAJOR AXIS INDEX: 2.75 CM/M2
ECHO RA MAJOR AXIS: 4.9 CM
ECHO RA MINOR AXIS INDEX: 1.12 CM/M2
ECHO RA MINOR AXIS: 2 CM
ECHO RA VOLUME: 8 ML
ECHO RV BASAL DIMENSION: 1.7 CM
ECHO RV LONGITUDINAL DIMENSION: 8.7 CM
ECHO RV MID DIMENSION: 1.9 CM
ECHO RV TAPSE: 2.8 CM (ref 1.7–?)
EKG P AXIS: -18 DEGREES
EKG P AXIS: -33 DEGREES
EKG P AXIS: -8 DEGREES
EKG P-R INTERVAL: 192 MS
EKG P-R INTERVAL: 212 MS
EKG P-R INTERVAL: 222 MS
EKG Q-T INTERVAL: 412 MS
EKG Q-T INTERVAL: 430 MS
EKG Q-T INTERVAL: 432 MS
EKG QRS DURATION: 78 MS
EKG QRS DURATION: 82 MS
EKG QRS DURATION: 86 MS
EKG QTC CALCULATION (BAZETT): 431 MS
EKG QTC CALCULATION (BAZETT): 438 MS
EKG QTC CALCULATION (BAZETT): 439 MS
EKG T AXIS: 55 DEGREES
EKG T AXIS: 80 DEGREES
EKG T AXIS: 87 DEGREES
ERYTHROCYTE [DISTWIDTH] IN BLOOD BY AUTOMATED COUNT: 11.8 % (ref 11.5–14.5)
GLUCOSE BLD-MCNC: 187 MG/DL (ref 70–99)
GLUCOSE BLD-MCNC: 237 MG/DL (ref 70–99)
GLUCOSE BLD-MCNC: 313 MG/DL (ref 70–99)
HCT VFR BLD AUTO: 32.4 % (ref 37–47)
HDLC SERPL-MCNC: 41 MG/DL (ref 65–121)
HGB BLD-MCNC: 10.6 G/DL (ref 12–16)
LDLC SERPL CALC-MCNC: 49 MG/DL
MCH RBC QN AUTO: 28.3 PG (ref 27–31)
MCHC RBC AUTO-ENTMCNC: 32.7 G/DL (ref 33–37)
MCV RBC AUTO: 86.4 FL (ref 81–99)
PERFORMED ON: ABNORMAL
PLATELET # BLD AUTO: 211 K/UL (ref 130–400)
PMV BLD AUTO: 10.6 FL (ref 9.4–12.3)
RBC # BLD AUTO: 3.75 M/UL (ref 4.2–5.4)
TRIGL SERPL-MCNC: 168 MG/DL (ref 0–149)
TROPONIN, HIGH SENSITIVITY: 23 NG/L (ref 0–14)
WBC # BLD AUTO: 7.3 K/UL (ref 4.8–10.8)

## 2024-07-18 PROCEDURE — 85027 COMPLETE CBC AUTOMATED: CPT

## 2024-07-18 PROCEDURE — 93010 ELECTROCARDIOGRAM REPORT: CPT | Performed by: INTERNAL MEDICINE

## 2024-07-18 PROCEDURE — 96372 THER/PROPH/DIAG INJ SC/IM: CPT

## 2024-07-18 PROCEDURE — 82962 GLUCOSE BLOOD TEST: CPT

## 2024-07-18 PROCEDURE — 36415 COLL VENOUS BLD VENIPUNCTURE: CPT

## 2024-07-18 PROCEDURE — 93356 MYOCRD STRAIN IMG SPCKL TRCK: CPT

## 2024-07-18 PROCEDURE — 93017 CV STRESS TEST TRACING ONLY: CPT

## 2024-07-18 PROCEDURE — 93308 TTE F-UP OR LMTD: CPT | Performed by: INTERNAL MEDICINE

## 2024-07-18 PROCEDURE — 84484 ASSAY OF TROPONIN QUANT: CPT

## 2024-07-18 PROCEDURE — 99222 1ST HOSP IP/OBS MODERATE 55: CPT | Performed by: INTERNAL MEDICINE

## 2024-07-18 PROCEDURE — 94760 N-INVAS EAR/PLS OXIMETRY 1: CPT

## 2024-07-18 PROCEDURE — A9502 TC99M TETROFOSMIN: HCPCS | Performed by: INTERNAL MEDICINE

## 2024-07-18 PROCEDURE — G0378 HOSPITAL OBSERVATION PER HR: HCPCS

## 2024-07-18 PROCEDURE — 6370000000 HC RX 637 (ALT 250 FOR IP): Performed by: INTERNAL MEDICINE

## 2024-07-18 PROCEDURE — 3430000000 HC RX DIAGNOSTIC RADIOPHARMACEUTICAL: Performed by: INTERNAL MEDICINE

## 2024-07-18 PROCEDURE — 6360000002 HC RX W HCPCS: Performed by: INTERNAL MEDICINE

## 2024-07-18 PROCEDURE — 2580000003 HC RX 258: Performed by: NURSE PRACTITIONER

## 2024-07-18 PROCEDURE — 6370000000 HC RX 637 (ALT 250 FOR IP): Performed by: NURSE PRACTITIONER

## 2024-07-18 PROCEDURE — 93005 ELECTROCARDIOGRAM TRACING: CPT | Performed by: INTERNAL MEDICINE

## 2024-07-18 PROCEDURE — 6360000002 HC RX W HCPCS: Performed by: NURSE PRACTITIONER

## 2024-07-18 PROCEDURE — 93321 DOPPLER ECHO F-UP/LMTD STD: CPT | Performed by: INTERNAL MEDICINE

## 2024-07-18 PROCEDURE — 93325 DOPPLER ECHO COLOR FLOW MAPG: CPT | Performed by: INTERNAL MEDICINE

## 2024-07-18 PROCEDURE — 78452 HT MUSCLE IMAGE SPECT MULT: CPT

## 2024-07-18 PROCEDURE — 93356 MYOCRD STRAIN IMG SPCKL TRCK: CPT | Performed by: INTERNAL MEDICINE

## 2024-07-18 PROCEDURE — 80061 LIPID PANEL: CPT

## 2024-07-18 RX ORDER — CARVEDILOL 6.25 MG/1
3.12 TABLET ORAL 2 TIMES DAILY WITH MEALS
Status: DISCONTINUED | OUTPATIENT
Start: 2024-07-18 | End: 2024-07-19 | Stop reason: HOSPADM

## 2024-07-18 RX ORDER — REGADENOSON 0.08 MG/ML
0.4 INJECTION, SOLUTION INTRAVENOUS
Status: COMPLETED | OUTPATIENT
Start: 2024-07-18 | End: 2024-07-18

## 2024-07-18 RX ADMIN — REGADENOSON 0.4 MG: 0.08 INJECTION, SOLUTION INTRAVENOUS at 12:28

## 2024-07-18 RX ADMIN — INSULIN LISPRO 2 UNITS: 100 INJECTION, SOLUTION INTRAVENOUS; SUBCUTANEOUS at 17:11

## 2024-07-18 RX ADMIN — SACUBITRIL AND VALSARTAN 1 TABLET: 24; 26 TABLET, FILM COATED ORAL at 21:02

## 2024-07-18 RX ADMIN — SACUBITRIL AND VALSARTAN 1 TABLET: 24; 26 TABLET, FILM COATED ORAL at 09:00

## 2024-07-18 RX ADMIN — SODIUM CHLORIDE, PRESERVATIVE FREE 10 ML: 5 INJECTION INTRAVENOUS at 21:03

## 2024-07-18 RX ADMIN — TETROFOSMIN 8 MILLICURIE: 0.23 INJECTION, POWDER, LYOPHILIZED, FOR SOLUTION INTRAVENOUS at 14:11

## 2024-07-18 RX ADMIN — INSULIN LISPRO 4 UNITS: 100 INJECTION, SOLUTION INTRAVENOUS; SUBCUTANEOUS at 21:20

## 2024-07-18 RX ADMIN — ENOXAPARIN SODIUM 30 MG: 100 INJECTION SUBCUTANEOUS at 21:03

## 2024-07-18 RX ADMIN — CARVEDILOL 3.12 MG: 6.25 TABLET, FILM COATED ORAL at 17:10

## 2024-07-18 RX ADMIN — TETROFOSMIN 24 MILLICURIE: 0.23 INJECTION, POWDER, LYOPHILIZED, FOR SOLUTION INTRAVENOUS at 14:11

## 2024-07-18 RX ADMIN — SODIUM CHLORIDE, PRESERVATIVE FREE 10 ML: 5 INJECTION INTRAVENOUS at 09:03

## 2024-07-18 RX ADMIN — ATORVASTATIN CALCIUM 40 MG: 40 TABLET, FILM COATED ORAL at 21:02

## 2024-07-18 RX ADMIN — ASPIRIN 81 MG: 81 TABLET, CHEWABLE ORAL at 09:04

## 2024-07-18 NOTE — PROGRESS NOTES
4 Eyes Skin Assessment     NAME:  Oveda J Severns  YOB: 1940  MEDICAL RECORD NUMBER:  [id number]    The patient is being assessed for  Admission    I agree that at least one RN has performed a thorough Head to Toe Skin Assessment on the patient. ALL assessment sites listed below have been assessed.      Areas assessed by both nurses:    Head, Face, Ears, Shoulders, Back, Chest, Arms, Elbows, Hands, Sacrum. Buttock, Coccyx, Ischium, and Legs. Feet and Heels        Does the Patient have a Wound? No noted wound(s)       [place] Prevention initiated by RN: Yes  Wound Care Orders initiated by RN: No    Pressure Injury (Stage 3,4, Unstageable, DTI, NWPT, and Complex wounds) if present, place Wound referral order by RN under : No    New Ostomies, if present place, Ostomy referral order under : No     Nurse 1 eSignature: Electronically signed by Nina Frost RN on 7/18/24 at 1:13 AM CDT    **SHARE this note so that the co-signing nurse can place an eSignature**    Nurse 2 eSignature: Electronically signed by Lorena Dyre RN on 7/18/24 at 1:14 AM CDT

## 2024-07-18 NOTE — PROGRESS NOTES
[facility]      Progress Note    Patient:  Oveda J Severns  YOB: 1940  Date of Service: 7/18/2024  MRN: [id number]   Acct: [id number]   Primary Care Physician: Ciara Salomon DO  Advance Directive: Full Code  Admit Date: 7/17/2024       Hospital Day: 0    Portions of this note have been copied forward, however, updated to reflect the most current clinical status of this patient.     CHIEF COMPLAINT chest pain    SUBJECTIVE: Just back from Caroline pain since early this a.m.      CUMULATIVE HOSPITAL COURSE:   Patient is a 83-year-old with past medical history of hyperlipidemia, hypertension and diabetes.  Patient reports starting in April she became more fatigued and short of breath with activity.  She had a cath in May 2022 which showed patent stent of her mid right coronary artery.  Her PCP sent her to the ER for further evaluation.  At her previous Caroline in 2023 showed a large inferior apical defect and an EF of 36%.  Troponins serial were 21, 17, 23, 22 and 23.  BNP was 2157.  Patient was admitted to hospitalist cardiology was consulted.  Repeat echo and Caroline completed today report pending.          Objective:   VITALS:  /63   Pulse 76   Temp 98.2 °F (36.8 °C) (Temporal)   Resp 16   Ht 1.626 m (5' 4\")   Wt 73 kg (161 lb)   SpO2 100%   BMI 27.64 kg/m²   24HR INTAKE/OUTPUT:  No intake or output data in the 24 hours ending 07/18/24 1807        Physical Exam  Vitals and nursing note reviewed.   Constitutional:       Appearance: She is ill-appearing.   HENT:      Head: Normocephalic.      Mouth/Throat:      Mouth: Mucous membranes are moist.   Eyes:      Extraocular Movements: Extraocular movements intact.      Pupils: Pupils are equal, round, and reactive to light.   Cardiovascular:      Rate and Rhythm: Normal rate and regular rhythm.   Pulmonary:      Effort: Pulmonary effort is normal.      Breath sounds: Normal breath sounds.   Abdominal:      General: Bowel sounds are

## 2024-07-18 NOTE — PLAN OF CARE
Problem: Discharge Planning  Goal: Discharge to home or other facility with appropriate resources  7/18/2024 1118 by Glenroy Hodges, RN  Outcome: Progressing  7/18/2024 0027 by Nina Frost RN  Outcome: Progressing     Problem: Safety - Adult  Goal: Free from fall injury  7/18/2024 1118 by Glenroy Hodges, RN  Outcome: Progressing  7/18/2024 0027 by Nina Frost RN  Outcome: Progressing     Problem: ABCDS Injury Assessment  Goal: Absence of physical injury  7/18/2024 1118 by Glenroy Hodges, RN  Outcome: Progressing  7/18/2024 0027 by Nina Frost RN  Outcome: Progressing     Problem: Skin/Tissue Integrity  Goal: Absence of new skin breakdown  Description: 1.  Monitor for areas of redness and/or skin breakdown  2.  Assess vascular access sites hourly  3.  Every 4-6 hours minimum:  Change oxygen saturation probe site  4.  Every 4-6 hours:  If on nasal continuous positive airway pressure, respiratory therapy assess nares and determine need for appliance change or resting period.  7/18/2024 1118 by Glenroy Hodges, RN  Outcome: Progressing  7/18/2024 0027 by Nina Frost RN  Outcome: Progressing

## 2024-07-18 NOTE — CONSULTS
2017    Dr Hauser-w/dilation over wire, 52 Algerian-hiatal hernia, distal esophageal stricture    UPPER GASTROINTESTINAL ENDOSCOPY  08/15/2018    Dr SANYA Finley-w/dilation over wire-52 [place]ian and EUS-Possible duodenitis, gastritis, duodenal stricture likely peptic stricture nature, hiatal hernia    UPPER GASTROINTESTINAL ENDOSCOPY  08/15/2018    Dr SANYA Finley-w/dilation over wire-52 [place]ian and EUS-Possible duodenitis, gastritis, duodenal stricture likely peptic stricture nature, hiatal hernia       Past Family History:  Family History   Problem Relation Age of Onset    Breast Cancer Mother 56    No Known Problems Daughter     Blindness Son         injured in a care home fight, he was a guard    Kidney stones Son     Colon Cancer Neg Hx     Colon Polyps Neg Hx     Esophageal Cancer Neg Hx     Liver Disease Neg Hx     Liver Cancer Neg Hx     Rectal Cancer Neg Hx     Stomach Cancer Neg Hx        Past Social History:  Social History     Socioeconomic History    Marital status:      Spouse name: Mr. David Severens    Number of children: 2    Years of education: Not on file    Highest education level: Not on file   Occupational History    Occupation: , Book keeper,      Comment: Retired   Tobacco Use    Smoking status: Former     Current packs/day: 0.00     Average packs/day: 0.5 packs/day for 10.0 years (5.0 ttl pk-yrs)     Types: Cigarettes     Start date: 1979     Quit date: 1989     Years since quittin.8    Smokeless tobacco: Never   Vaping Use    Vaping Use: Never used   Substance and Sexual Activity    Alcohol use: Yes     Comment: glass of wine about once per month    Drug use: Never    Sexual activity: Yes     Partners: Male     Comment: has a son and a daughter   Other Topics Concern    Not on file   Social History Narrative    CODE STATUS: DNR    HEALTH CARE PROXY: her , Mr. David Severens, [phone]    AMBULATES: independently    DOMICILED: has 2 steps to  tablet Take 1 tablet by mouth nightly 4/6/23   Margarette Anders MD   vitamin D (ERGOCALCIFEROL) 1.25 MG (40908 UT) CAPS capsule Take 1 capsule by mouth once a week    Chavez Luna MD   pantoprazole (PROTONIX) 40 MG tablet Take 1 tablet by mouth daily    Chavez Luna MD   aspirin EC 81 MG EC tablet Take 1 tablet by mouth daily 7/27/21   Nury Praedes MD   insulin glargine (LANTUS) 100 UNIT/ML injection vial Inject 16 Units into the skin nightly    Chavez Luna MD   levothyroxine (SYNTHROID) 75 MCG tablet Take 1 tablet by mouth Daily    ProviderChavez MD       Current Meds:   sacubitril-valsartan  1 tablet Oral BID    sodium chloride flush  5-40 mL IntraVENous 2 times per day    aspirin  81 mg Oral Daily    atorvastatin  40 mg Oral Nightly    insulin lispro  0-4 Units SubCUTAneous TID WC    insulin lispro  0-4 Units SubCUTAneous Nightly    enoxaparin  30 mg SubCUTAneous Q24H       Current Infused Meds:   sodium chloride      dextrose      sodium chloride 75 mL/hr at 07/17/24 2209       Physical Exam:  Vitals:    07/18/24 0720   BP: 117/64   Pulse: 63   Resp: 16   Temp: 97.3 °F (36.3 °C)   SpO2: 100%     No intake or output data in the 24 hours ending 07/18/24 1247  Estimated body mass index is 27.64 kg/m² as calculated from the following:    Height as of this encounter: 1.626 m (5' 4\").    Weight as of this encounter: 73 kg (161 lb).        Physical Exam    Labs:  Recent Labs     07/17/24  1058 07/18/24  0216   WBC 8.2 7.3   HGB 11.8* 10.6*    211       Recent Labs     07/17/24  1058      K 4.4   CL 95*   CO2 29   BUN 32*   CREATININE 1.5*   LABGLOM 34*   CALCIUM 9.9       CK, CKMB, Troponin: @LABRCNT (CKTOTAL:3, CKMB:3, TROPONINI:3)@    Last 3 BNP:  No results for input(s): \"BNP\" in the last 72 hours.        IMAGING:  XR CHEST PORTABLE    Result Date: 7/17/2024  EXAM:  CHEST RADIOGRAPH (1 VIEW)  TECHNIQUE:  Frontal Chest Radiograph.  HISTORY:  Chest pain

## 2024-07-18 NOTE — CONSULTS
Palliative Care:  Pt known to Palliative Care.     83 y.o. female  presented to [facility] ED for evaluation of chest pain. Sent over from her cardiology appt.   Pt has history of CAD with prior stent placement, hypertension, hyperlipidemia and diabetes.    Currently pt is pain free.  Her  is at bedside and we reviewed life at home since last encounter.  She is independent in her own care.  Requires some assist with activities.  She has a cane she uses PRN.            Past Medical History:        Past Medical History:   Diagnosis Date    Fernandes's esophagus     DDD (degenerative disc disease), lumbar     DM (diabetes mellitus), type 2 (HCC)     Duodenal ulcer     E. coli sepsis (HCC) 02/2014    Elevated pancreatic enzyme 2017    Gastritis     GERD (gastroesophageal reflux disease)     Hiatal hernia     History of colon polyps     HTN (hypertension)     Hypercholesterolemia     Hypothyroidism     Osteoarthritis     other     Non Specified Ulcers    Palliative care patient 04/06/2023    Pernicious anemia     Skin cancer     History OF.     Venous angioma     left-parietal       Advance Directives:      Full code  No advance directives on file.  Reviewed ACP note and no changes.         Pain/Other Symptoms:      No pain at this time.  On room air and denies soa.         Plan:   Cardiology following.  Continue medical treatments and monitoring.         Patient/family discussion r/t goals:           Goal is to return home when stable to discharge.           Palliative Performance Scale:   80         Palliative Care team will continue to follow and support, with ongoing review of pt's goals of care.                Electronically signed by Cynthia Noble RN on 7/18/2024 at 9:47 AM

## 2024-07-18 NOTE — PROGRESS NOTES
Automatic Dose Adjustment of                Subcutaneous Anticoagulant for Prophylaxis    Oveda J Severns is a 83 y.o. female.     Recent Labs     07/17/24  1058   CREATININE 1.5*       Estimated Creatinine Clearance: 28 mL/min (A) (based on SCr of 1.5 mg/dL (H)).    Weight:  Wt Readings from Last 1 Encounters:   07/17/24 73.9 kg (163 lb)           Pharmacy has adjusted subcutaneous anticoagulant for prophylaxis to Enoxaparin 30 mg SC daily based on the patient's weight and estimated CrCl per [facility] policy.               ELLA COSME, PHARM D, 7/17/2024, 7:27 PM

## 2024-07-18 NOTE — PROGRESS NOTES
Oveda J Severns arrived to room # 714-02.   Presented with: chest pain  Mental Status: Patient is oriented, alert, coherent, logical, thought processes intact, and able to concentrate and follow conversation.   Vitals:    07/18/24 0014   BP: (!) 112/55   Pulse: 68   Resp: 14   Temp: 97.7 °F (36.5 °C)   SpO2: 98%     Patient safety contract and falls prevention contract reviewed with patient Yes.  Oriented Patient to room.  Call light within reach. Yes.  Needs, issues or concerns expressed at this time: no.      Electronically signed by Nina Frost RN on 7/18/2024 at 1:14 AM

## 2024-07-19 VITALS
SYSTOLIC BLOOD PRESSURE: 109 MMHG | HEART RATE: 61 BPM | TEMPERATURE: 98.1 F | RESPIRATION RATE: 16 BRPM | WEIGHT: 161 LBS | HEIGHT: 64 IN | BODY MASS INDEX: 27.49 KG/M2 | OXYGEN SATURATION: 99 % | DIASTOLIC BLOOD PRESSURE: 55 MMHG

## 2024-07-19 LAB
ALLENS TEST: ABNORMAL
BASE EXCESS ARTERIAL: 3.3 MMOL/L (ref -2–2)
CARBOXYHEMOGLOBIN ARTERIAL: 1.9 % (ref 0–5)
FIO2: 21 %
GLUCOSE BLD-MCNC: 198 MG/DL (ref 70–99)
GLUCOSE BLD-MCNC: 261 MG/DL (ref 70–99)
HCO3 ARTERIAL: 26.8 MMOL/L (ref 22–26)
HEMOGLOBIN, ART, EXTENDED: 11.8 G/DL (ref 12–16)
METHEMOGLOBIN ARTERIAL: 0.8 %
NUC STRESS EJECTION FRACTION: 42 %
O2 CONTENT ARTERIAL: 16.2 ML/DL
O2 SAT, ARTERIAL: 96.8 %
O2 THERAPY: ABNORMAL
PCO2 ARTERIAL: 36 MMHG (ref 35–45)
PERFORMED ON: ABNORMAL
PERFORMED ON: ABNORMAL
PH ARTERIAL: 7.48 (ref 7.35–7.45)
PO2 ARTERIAL: 92 MMHG (ref 80–100)
POTASSIUM BLD-SCNC: 3.7 MMOL/L
SAMPLE SOURCE: ABNORMAL
SPONT RATE(BPM): 16
STRESS BASELINE DIAS BP: 61 MMHG
STRESS BASELINE HR: 64 BPM
STRESS BASELINE SYS BP: 107 MMHG
STRESS EXERCISE DUR MIN: 5 MIN
STRESS EXERCISE DUR SEC: 0 SEC
STRESS PEAK DIAS BP: 61 MMHG
STRESS PEAK SYS BP: 107 MMHG
STRESS PERCENT HR ACHIEVED: 74 %
STRESS POST PEAK HR: 102 BPM
STRESS RATE PRESSURE PRODUCT: NORMAL BPM*MMHG
STRESS STAGE 1 BP: NORMAL MMHG
STRESS STAGE 1 DURATION: 1 MIN:SEC
STRESS STAGE 1 HR: 80 BPM
STRESS STAGE 2 BP: NORMAL MMHG
STRESS STAGE 2 DURATION: 1 MIN:SEC
STRESS STAGE 2 HR: 97 BPM
STRESS STAGE 3 BP: NORMAL MMHG
STRESS STAGE 3 DURATION: 1 MIN:SEC
STRESS STAGE 3 HR: 93 BPM
STRESS STAGE 4 BP: NORMAL MMHG
STRESS STAGE 4 DURATION: 1 MIN:SEC
STRESS STAGE 4 HR: 86 BPM
STRESS STAGE 5 BP: NORMAL MMHG
STRESS STAGE 5 DURATION: 1 MIN:SEC
STRESS STAGE 5 HR: 85 BPM
STRESS STAGE RECOVERY 1 BP: NORMAL MMHG
STRESS STAGE RECOVERY 1 DURATION: 1 MIN:SEC
STRESS STAGE RECOVERY 1 HR: 83 BPM
STRESS TARGET HR: 138 BPM

## 2024-07-19 PROCEDURE — 36600 WITHDRAWAL OF ARTERIAL BLOOD: CPT

## 2024-07-19 PROCEDURE — 78452 HT MUSCLE IMAGE SPECT MULT: CPT | Performed by: INTERNAL MEDICINE

## 2024-07-19 PROCEDURE — 6370000000 HC RX 637 (ALT 250 FOR IP): Performed by: INTERNAL MEDICINE

## 2024-07-19 PROCEDURE — 82803 BLOOD GASES ANY COMBINATION: CPT

## 2024-07-19 PROCEDURE — 93016 CV STRESS TEST SUPVJ ONLY: CPT | Performed by: INTERNAL MEDICINE

## 2024-07-19 PROCEDURE — 93018 CV STRESS TEST I&R ONLY: CPT | Performed by: INTERNAL MEDICINE

## 2024-07-19 PROCEDURE — 82962 GLUCOSE BLOOD TEST: CPT

## 2024-07-19 PROCEDURE — 1200000000 HC SEMI PRIVATE

## 2024-07-19 PROCEDURE — 6370000000 HC RX 637 (ALT 250 FOR IP): Performed by: NURSE PRACTITIONER

## 2024-07-19 PROCEDURE — 94760 N-INVAS EAR/PLS OXIMETRY 1: CPT

## 2024-07-19 PROCEDURE — G0378 HOSPITAL OBSERVATION PER HR: HCPCS

## 2024-07-19 PROCEDURE — 99232 SBSQ HOSP IP/OBS MODERATE 35: CPT | Performed by: INTERNAL MEDICINE

## 2024-07-19 RX ORDER — SPIRONOLACTONE 25 MG/1
25 TABLET ORAL DAILY
Qty: 30 TABLET | Refills: 0 | Status: SHIPPED | OUTPATIENT
Start: 2024-07-19 | End: 2024-08-18

## 2024-07-19 RX ORDER — SPIRONOLACTONE 25 MG/1
25 TABLET ORAL DAILY
Status: DISCONTINUED | OUTPATIENT
Start: 2024-07-19 | End: 2024-07-19 | Stop reason: HOSPADM

## 2024-07-19 RX ADMIN — INSULIN LISPRO 2 UNITS: 100 INJECTION, SOLUTION INTRAVENOUS; SUBCUTANEOUS at 12:38

## 2024-07-19 RX ADMIN — ASPIRIN 81 MG: 81 TABLET, CHEWABLE ORAL at 08:20

## 2024-07-19 RX ADMIN — CARVEDILOL 3.12 MG: 6.25 TABLET, FILM COATED ORAL at 08:20

## 2024-07-19 RX ADMIN — SACUBITRIL AND VALSARTAN 1 TABLET: 24; 26 TABLET, FILM COATED ORAL at 08:20

## 2024-07-19 RX ADMIN — SPIRONOLACTONE 25 MG: 25 TABLET ORAL at 08:20

## 2024-07-19 NOTE — CARE COORDINATION
[facility] for [facility] procedures . Jazlyn has faxed the paper work to [facility] [phone]  Electronically signed by MARISEL CARTER on 7/19/2024 at 2:27 PM

## 2024-07-19 NOTE — PROGRESS NOTES
Cardiology Daily Note Kristian Weaver MD      Patient:  Oveda J Severns  [id number]    Patient Active Problem List    Diagnosis Date Noted    History of MI (myocardial infarction) 07/25/2021    DDD (degenerative disc disease), lumbar     Hypercholesterolemia     DM (diabetes mellitus), type 2 (HCC)     Left-sided cerebrovascular accident (CVA) (HCC) 10/15/2022    Chest pain 05/11/2022    Substernal chest pain relieved by nitroglycerin 07/17/2024    Unstable angina (HCC) 07/17/2024    Palliative care patient 04/06/2023    Stage 3b chronic kidney disease (CKD) (HCC) 04/04/2023    Congestive heart failure, unspecified HF chronicity, unspecified heart failure type (HCC) 04/04/2023    Elevated lipase     Duodenal stricture     Duodenitis     Esophageal stricture     S/P dilatation of esophageal stricture 12/26/2017    Non-intractable vomiting with nausea 11/20/2017    Midepigastric pain 11/20/2017    Generalized weakness 11/20/2017    History of gastric ulcer 11/20/2017    History of adenomatous polyp of colon 11/12/2015    Esophageal dysphagia 09/16/2014    Chronic GERD 09/16/2014    History of esophageal stricture 09/16/2014       Admit Date:  7/17/2024    Admission Problem List: Present on Admission:   Substernal chest pain relieved by nitroglycerin   DM (diabetes mellitus), type 2 (HCC)   Hypercholesterolemia   Stage 3b chronic kidney disease (CKD) (HCC)   Unstable angina (HCC)      Cardiac Specific Data:  Specialty Problems          Cardiology Problems    Hypercholesterolemia        Chest pain        Left-sided cerebrovascular accident (CVA) (HCC)        Congestive heart failure, unspecified HF chronicity, unspecified heart failure type (HCC)        * (Principal) Substernal chest pain relieved by nitroglycerin        Unstable angina (HCC)           Subjective:  Ms. Severns seen today primary complaints fatigue and excessive sleepiness. No chest pain.Caroline scan reviewed EF 42% large inferior scar medical management for

## 2024-07-19 NOTE — PLAN OF CARE
Problem: Discharge Planning  Goal: Discharge to home or other facility with appropriate resources  7/18/2024 2315 by Jose Mckeon LPN  Outcome: Progressing  7/18/2024 1118 by Glenroy Hodges, RN  Outcome: Progressing     Problem: Safety - Adult  Goal: Free from fall injury  7/18/2024 2315 by Jose Mckeon LPN  Outcome: Progressing  Flowsheets  Taken 7/18/2024 2247  Free From Fall Injury: Instruct family/caregiver on patient safety  Taken 7/18/2024 2218  Free From Fall Injury: Instruct family/caregiver on patient safety  7/18/2024 1118 by Glenroy Hodges, RN  Outcome: Progressing     Problem: ABCDS Injury Assessment  Goal: Absence of physical injury  7/18/2024 2315 by Jose Mckeon LPN  Outcome: Progressing  Flowsheets (Taken 7/18/2024 2247)  Absence of Physical Injury: Implement safety measures based on patient assessment  7/18/2024 1118 by Glenroy Hodges, RN  Outcome: Progressing     Problem: Skin/Tissue Integrity  Goal: Absence of new skin breakdown  Description: 1.  Monitor for areas of redness and/or skin breakdown  2.  Assess vascular access sites hourly  3.  Every 4-6 hours minimum:  Change oxygen saturation probe site  4.  Every 4-6 hours:  If on nasal continuous positive airway pressure, respiratory therapy assess nares and determine need for appliance change or resting period.  7/18/2024 2315 by Jose Mckeon LPN  Outcome: Progressing  7/18/2024 1118 by Glenroy Hodges, RN  Outcome: Progressing     Problem: Chronic Conditions and Co-morbidities  Goal: Patient's chronic conditions and co-morbidity symptoms are monitored and maintained or improved  Outcome: Progressing

## 2024-07-19 NOTE — DISCHARGE SUMMARY
Discharge Summary    NAME: Oveda J Severns  :  1940  MRN:  [id number]    Admit date:  2024  Discharge date:  2024    Admitting Physician:  Judy Suarez MD    Advance Directive: Full Code    Consults: cardiology    Primary Care Physician:  Ciara Salomon,     Discharge Diagnoses:  Principal Problem:    Substernal chest pain relieved by nitroglycerin  Active Problems:    Hypercholesterolemia    DM (diabetes mellitus), type 2 (HCC)    Chest pain    Stage 3b chronic kidney disease (CKD) (HCC)    Unstable angina (HCC)  Resolved Problems:    * No resolved hospital problems. *      Significant Diagnostic Studies:   Nuclear stress test with myocardial perfusion    Result Date: 2024    Stress Function: Post-stress ejection fraction is 42%.   Perfusion Defect: There is a left ventricular stress perfusion defect present in the inferior segment(s) that is fixed. Perfusion defect was visually and quantitatively present. The defect appears to be infarction.   ECG: Resting ECG demonstrates normal sinus rhythm.   Stress Test: A pharmacological stress test was performed using regadenoson (Lexiscan). The patient reported chest pain during the stress test. Chest pain was characterized as pressure-like and radiated into the throat. Symptoms began during stress and ended during recovery. The patient reached the end of the protocol.   Resting ECG: The ECG shows sinus rhythm. Resting ECG shows non-specific ST-segment abnormalities.   Stress ECG: Arrhythmias during stress: occasional PVCs. Non-specific ST abnormality noted. There were no noted arrhythmias during recovery. Mildly reduced ejection fraction 42% Abnormal perfusion study large fixed inferior defect consistent with scar no inducible ischemia     Echo (TTE) limited (PRN contrast/bubble/strain/3D)    Result Date: 2024    Left Ventricle: Moderately reduced left ventricular systolic function. EF by visual approximation is 40%. Left ventricle  Instructions:   Follow up with Ciara Salomon DO in 3-7 days.  Take medications as directed.  Resume activity as tolerated.    Diet: ADULT DIET; Regular; 4 carb choices (60 gm/meal); No Added Salt (3-4 gm)     Disposition: Patient is medically stable and will be discharged home.    Time spent on discharge 40 .    Signed:  MARTY Serrano CNP  7/19/2024 12:17 PM

## 2024-07-19 NOTE — CARE COORDINATION
07/19/24 1207   IMM Letter   IMM Letter given to Patient/Family/Significant other/Guardian/POA/by: Candace elizabeth   IMM Letter date given: 07/19/24   IMM Letter time given: 1207     First IMM given and explained to patient.  All questions answered.  Patient upgraded from observation to inpatient.  Signed copy placed in patient soft chart.  Electronically signed by MARISEL CARTER on 7/19/2024 at 12:08 PM

## 2024-07-22 ENCOUNTER — TELEPHONE (OUTPATIENT)
Dept: CARDIOLOGY CLINIC | Age: 84
End: 2024-07-22

## 2024-07-22 NOTE — TELEPHONE ENCOUNTER
I called the pharmacy and looked at Dr. Weaver's hospital note, and I saw that he sent in Aldactone, pharmacy agreed, I then called the patient and told her of my findings, she understood all I said.

## 2024-07-22 NOTE — TELEPHONE ENCOUNTER
Juanita,    This patient is calling requesting Lovenox, she said per Dr. Weaver. Do you see anywhere as to why she would need this.

## 2024-08-12 ENCOUNTER — TRANSCRIBE ORDERS (OUTPATIENT)
Dept: ADMINISTRATIVE | Age: 84
End: 2024-08-12

## 2024-08-12 DIAGNOSIS — Z12.31 SCREENING MAMMOGRAM, ENCOUNTER FOR: Primary | ICD-10-CM

## 2024-08-12 DIAGNOSIS — Z78.0 POSTMENOPAUSAL: ICD-10-CM

## 2024-08-16 ENCOUNTER — HOSPITAL ENCOUNTER (OUTPATIENT)
Dept: WOMENS IMAGING | Age: 84
Discharge: HOME | End: 2024-08-16
Payer: MEDICARE

## 2024-08-16 ENCOUNTER — OFFICE VISIT (OUTPATIENT)
Dept: CARDIOLOGY CLINIC | Age: 84
End: 2024-08-16
Payer: MEDICARE

## 2024-08-16 VITALS
SYSTOLIC BLOOD PRESSURE: 124 MMHG | HEART RATE: 72 BPM | BODY MASS INDEX: 28 KG/M2 | WEIGHT: 164 LBS | DIASTOLIC BLOOD PRESSURE: 80 MMHG | OXYGEN SATURATION: 99 % | HEIGHT: 64 IN

## 2024-08-16 DIAGNOSIS — I25.10 CORONARY ARTERY DISEASE INVOLVING NATIVE CORONARY ARTERY OF NATIVE HEART WITHOUT ANGINA PECTORIS: Primary | ICD-10-CM

## 2024-08-16 DIAGNOSIS — Z78.0 POSTMENOPAUSAL: ICD-10-CM

## 2024-08-16 DIAGNOSIS — I10 ESSENTIAL HYPERTENSION: ICD-10-CM

## 2024-08-16 DIAGNOSIS — Z12.31 SCREENING MAMMOGRAM, ENCOUNTER FOR: ICD-10-CM

## 2024-08-16 DIAGNOSIS — I50.22 CHRONIC SYSTOLIC CONGESTIVE HEART FAILURE (HCC): ICD-10-CM

## 2024-08-16 DIAGNOSIS — E78.5 DYSLIPIDEMIA: ICD-10-CM

## 2024-08-16 PROCEDURE — 99214 OFFICE O/P EST MOD 30 MIN: CPT | Performed by: CLINICAL NURSE SPECIALIST

## 2024-08-16 PROCEDURE — 77080 DXA BONE DENSITY AXIAL: CPT

## 2024-08-16 PROCEDURE — 3079F DIAST BP 80-89 MM HG: CPT | Performed by: CLINICAL NURSE SPECIALIST

## 2024-08-16 PROCEDURE — 77063 BREAST TOMOSYNTHESIS BI: CPT

## 2024-08-16 PROCEDURE — 3074F SYST BP LT 130 MM HG: CPT | Performed by: CLINICAL NURSE SPECIALIST

## 2024-08-16 PROCEDURE — 1123F ACP DISCUSS/DSCN MKR DOCD: CPT | Performed by: CLINICAL NURSE SPECIALIST

## 2024-08-16 RX ORDER — METOPROLOL SUCCINATE 25 MG/1
25 TABLET, EXTENDED RELEASE ORAL NIGHTLY
Qty: 30 TABLET | Refills: 3 | Status: SHIPPED | OUTPATIENT
Start: 2024-08-16

## 2024-08-16 RX ORDER — SACUBITRIL AND VALSARTAN 24; 26 MG/1; MG/1
1 TABLET, FILM COATED ORAL DAILY
Qty: 60 TABLET | Refills: 2 | Status: SHIPPED
Start: 2024-08-16

## 2024-08-16 NOTE — PATIENT INSTRUCTIONS
Stay off the coreg  Start Toprol 25mg nightly  Cut your entresto to once a day in the AM  Continue the aldactone daily     Use the lasix only as needed.     Maintain good blood pressure control-goal<130/80 at rest  Maintain good cholesterol control LDL goal<70 with arterial disease  If you are diabetic work to keep/obtain hemoglobin A1c< 7    Follow up in 4-6 weeks    Call with any questions or concerns  Follow up with Ciara Salomon, DO for non cardiac problems  Report any new problems  Cardiovascular Fitness-Exercise as tolerated.  Strive for 30 minutes of exercise most days of the week.    Cardiac / Healthy Diet- Avoid processed high fat foods, maintain low sodium/salt   Continue current medications as directed  Continue plan of treatment  It is always recommended that you bring your medications bottles with you to each visit - this is for your safety!

## 2024-08-16 NOTE — PROGRESS NOTES
recorded in [facility]:    Patient Active Problem List    Diagnosis Date Noted    History of MI (myocardial infarction) 07/25/2021    DDD (degenerative disc disease), lumbar     Hypercholesterolemia     DM (diabetes mellitus), type 2 (HCC)     Left-sided cerebrovascular accident (CVA) (HCC) 10/15/2022    Chest pain 05/11/2022    Substernal chest pain relieved by nitroglycerin 07/17/2024    Unstable angina (HCC) 07/17/2024    Palliative care patient 04/06/2023    Stage 3b chronic kidney disease (CKD) ([facility]) 04/04/2023    Congestive heart failure, unspecified HF chronicity, unspecified heart failure type ([facility]) 04/04/2023    Elevated lipase     Duodenal stricture     Duodenitis     Esophageal stricture     S/P dilatation of esophageal stricture 12/26/2017    Non-intractable vomiting with nausea 11/20/2017    Midepigastric pain 11/20/2017    Generalized weakness 11/20/2017    History of gastric ulcer 11/20/2017    History of adenomatous polyp of colon 11/12/2015    Esophageal dysphagia 09/16/2014    Chronic GERD 09/16/2014    History of esophageal stricture 09/16/2014     Past Medical History:   Diagnosis Date    Fernandes's esophagus     DDD (degenerative disc disease), lumbar     DM (diabetes mellitus), type 2 (HCC)     Duodenal ulcer     E. coli sepsis ([facility]) 02/2014    Elevated pancreatic enzyme 2017    Gastritis     GERD (gastroesophageal reflux disease)     Hiatal hernia     History of colon polyps     HTN (hypertension)     Hypercholesterolemia     Hypothyroidism     Osteoarthritis     other     Non Specified Ulcers    Palliative care patient 04/06/2023    Pernicious anemia     Skin cancer     History OF.     Venous angioma     left-parietal     Past Surgical History:   Procedure Laterality Date    BREAST SURGERY      BG Biopsy    CHOLECYSTECTOMY, OPEN      COLONOSCOPY  03/16/2005    DR. OTERO    COLONOSCOPY  05/21/2010    tubular adenoma    COLONOSCOPY  12/31/2015    Dr Hauser, normal, 5 yr recall    FRACTURE

## 2024-10-02 ENCOUNTER — OFFICE VISIT (OUTPATIENT)
Dept: CARDIOLOGY CLINIC | Age: 84
End: 2024-10-02
Payer: MEDICARE

## 2024-10-02 VITALS
WEIGHT: 169 LBS | OXYGEN SATURATION: 99 % | HEART RATE: 56 BPM | DIASTOLIC BLOOD PRESSURE: 62 MMHG | HEIGHT: 64 IN | BODY MASS INDEX: 28.85 KG/M2 | SYSTOLIC BLOOD PRESSURE: 100 MMHG

## 2024-10-02 DIAGNOSIS — I50.22 CHF (CONGESTIVE HEART FAILURE), NYHA CLASS II, CHRONIC, SYSTOLIC (HCC): Primary | ICD-10-CM

## 2024-10-02 DIAGNOSIS — I25.10 CORONARY ARTERY DISEASE INVOLVING NATIVE CORONARY ARTERY OF NATIVE HEART WITHOUT ANGINA PECTORIS: ICD-10-CM

## 2024-10-02 DIAGNOSIS — I10 ESSENTIAL HYPERTENSION: ICD-10-CM

## 2024-10-02 PROCEDURE — 3078F DIAST BP <80 MM HG: CPT | Performed by: CLINICAL NURSE SPECIALIST

## 2024-10-02 PROCEDURE — 99214 OFFICE O/P EST MOD 30 MIN: CPT | Performed by: CLINICAL NURSE SPECIALIST

## 2024-10-02 PROCEDURE — 1123F ACP DISCUSS/DSCN MKR DOCD: CPT | Performed by: CLINICAL NURSE SPECIALIST

## 2024-10-02 PROCEDURE — 3074F SYST BP LT 130 MM HG: CPT | Performed by: CLINICAL NURSE SPECIALIST

## 2024-10-02 RX ORDER — METOPROLOL SUCCINATE 25 MG/1
12.5 TABLET, EXTENDED RELEASE ORAL NIGHTLY
Qty: 30 TABLET | Refills: 3 | Status: SHIPPED | OUTPATIENT
Start: 2024-10-02

## 2024-10-02 RX ORDER — FUROSEMIDE 40 MG
40 TABLET ORAL DAILY PRN
COMMUNITY
Start: 2024-09-21

## 2024-10-02 NOTE — PROGRESS NOTES
[facility] Cardiology  [address] [address]  Phone: [phone]  Fax: [phone]    OFFICE VISIT:  10/2/2024    Oveda J Severns - : 1940    Reason For Visit:  Jarred is a 83 y.o. female who is here for Follow-up (Pt has issues with low bp), Coronary Artery Disease, Congestive Heart Failure, and Hyperlipidemia    History of hypertension, hyperlipidemia and coronary disease with inferior wall MI in  receiving drug-eluting stent to the mid RCA    heart failure with reduced ejection fraction noted with EF 30 to 35%.  GDMT added but adjusted due to CONNER  In May she was having some fluid after returning home from spending the winter in [place].  She was tolerating her Entresto.  Unable to tolerate SGL 2 inhibitors due to yeast infection.  Hospitalized in May with weakness.  Echo at that time showed EF 35 to 40%    In  spironolactone was added to her heart failure management.  She was seen last month in the office with chest pain.  Went to the emergency room for evaluation    Nuclear stress test 2024 showed EF 40% with large fixed inferior defect with no new ischemia.  2D echo stable    She was restarted on GDMT including carvedilol, Entresto and spironolactone    Patient was seen in August feeling significant fatigue and hypotension.  Carvedilol was discontinued by primary care    Her carvedilol was switched to lower dose Toprol 25 mg nightly.  We cut the Entresto to once a day and continue the spironolactone.  We discussed when and how to use Lasix.  She returns today in follow-up    She states she feels feel poorly.  She is tired and fatigued.  She states she has dizziness.  Blood pressure will often go into the 80s.  She states for the last week she wakes up at night and has to sit up because she does not feel like she can breathe.  Should go get the recliner for a while and she can come back and lay down.  She states her weights have been fairly stable and she has not

## 2024-11-12 ENCOUNTER — TELEPHONE (OUTPATIENT)
Dept: CARDIOLOGY CLINIC | Age: 84
End: 2024-11-12

## 2024-11-12 NOTE — TELEPHONE ENCOUNTER
Due to a late arrival for Erlinda  I called the patient to move her appt an afternoon appt   left a jennifer

## 2024-11-13 ENCOUNTER — TELEPHONE (OUTPATIENT)
Dept: CARDIOLOGY CLINIC | Age: 84
End: 2024-11-13

## 2024-11-18 ENCOUNTER — TELEPHONE (OUTPATIENT)
Dept: CARDIOLOGY CLINIC | Age: 84
End: 2024-11-18

## 2024-11-18 ENCOUNTER — OFFICE VISIT (OUTPATIENT)
Dept: CARDIOLOGY CLINIC | Age: 84
End: 2024-11-18

## 2024-11-18 VITALS
SYSTOLIC BLOOD PRESSURE: 110 MMHG | BODY MASS INDEX: 28 KG/M2 | WEIGHT: 164 LBS | DIASTOLIC BLOOD PRESSURE: 60 MMHG | HEART RATE: 64 BPM | HEIGHT: 64 IN

## 2024-11-18 DIAGNOSIS — I10 ESSENTIAL HYPERTENSION: ICD-10-CM

## 2024-11-18 DIAGNOSIS — I50.9 CONGESTIVE HEART FAILURE, UNSPECIFIED HF CHRONICITY, UNSPECIFIED HEART FAILURE TYPE (HCC): ICD-10-CM

## 2024-11-18 DIAGNOSIS — Z01.818 PRE-OP TESTING: Primary | ICD-10-CM

## 2024-11-18 DIAGNOSIS — I25.10 CORONARY ARTERY DISEASE INVOLVING NATIVE CORONARY ARTERY OF NATIVE HEART WITHOUT ANGINA PECTORIS: ICD-10-CM

## 2024-11-18 DIAGNOSIS — I50.22 CHF (CONGESTIVE HEART FAILURE), NYHA CLASS II, CHRONIC, SYSTOLIC (HCC): Primary | ICD-10-CM

## 2024-11-18 DIAGNOSIS — I50.22 CHRONIC SYSTOLIC CONGESTIVE HEART FAILURE (HCC): Primary | ICD-10-CM

## 2024-11-18 DIAGNOSIS — I50.22 CHF (CONGESTIVE HEART FAILURE), NYHA CLASS II, CHRONIC, SYSTOLIC (HCC): ICD-10-CM

## 2024-11-18 RX ORDER — MIDODRINE HYDROCHLORIDE 5 MG/1
5 TABLET ORAL 3 TIMES DAILY
Qty: 90 TABLET | Refills: 3 | Status: SHIPPED | OUTPATIENT
Start: 2024-11-18

## 2024-11-18 NOTE — PROGRESS NOTES
tolerated.    Cardiac / Healthy Diet- Avoid processed high fat foods, maintain low sodium/salt   Continue current medications as directed  Continue plan of treatment  It is always recommended that you bring your medications bottles with you to each visit - this is for your safety!       MARTY Quintanilla dragon/transcription disclaimer: Much of this encounter note is electronic transcription/translation of spoken language to printed tach. Electronic translation of spoken language may be erroneous, or at times, nonsensical words or phrases may be inadvertently transcribed. Although, I have reviewed the note for such errors, some may still exist.

## 2024-11-18 NOTE — TELEPHONE ENCOUNTER
[place] at the [facility] [place] located on the first floor of [facility].   Enter through hospital main entrance and turn immediately to the left.    Procedure Date: Tuesday, November 26, 2024. Advised patient will receive a phone call from cath lab day prior to procedure with arrival time.      Patient's contact number:  [phone] (home)     CardioMems Implant    Prep Instructions:    1.  Do not eat or drink anything after midnight the night before your procedure.  May take morning medications with a sip of water unless otherwise directed not to.  2.  You should arrange to have someone take you home rather than drive yourself.    3.  Further plans will be made following your procedure.          If for any reason you are unable to keep this appointment, please contact [facility] Cardiology, [phone], as soon as possible to reschedule.

## 2024-11-18 NOTE — ED PROVIDER NOTES
[facility] EMERGENCY DEPT  EMERGENCY DEPARTMENT ENCOUNTER      Pt Name: Soni Bishop  MRN: [id number]  [place] 1940  Date of evaluation: 7/25/2021  Provider: Nikia Mathew MD    CHIEF COMPLAINT       Chief Complaint   Patient presents with    Chest Pain         HISTORY OF PRESENT ILLNESS   (Location/Symptom, Timing/Onset,Context/Setting, Quality, Duration, Modifying Factors, Severity)  Note limiting factors. Soni Bishop is a 79 y.o. female who presents to the emergency department with complaint of chest pain that started around 6:30-7:00 this morning. Pain is improved compared to initial onset but persists. On EMS arrival, patient was noted to have inferior ST elevations on EKG. Patient has no previous cardiac history. Does report a history of diabetes but no hypertension or cholesterol. Denies any shortness of breath, abdominal pain. Did have some nausea and vomiting which has improved. HPI    NursingNotes were reviewed. REVIEW OF SYSTEMS    (2-9 systems for level 4, 10 or more for level 5)     Review of Systems   Constitutional: Positive for diaphoresis and fatigue. Negative for fever. HENT: Negative for congestion, rhinorrhea and voice change. Eyes: Negative for pain and redness. Respiratory: Negative for cough and shortness of breath. Cardiovascular: Positive for chest pain. Gastrointestinal: Negative for abdominal pain, diarrhea and vomiting. Endocrine: Negative. Genitourinary: Negative. Musculoskeletal: Negative for arthralgias and gait problem. Skin: Negative for rash and wound. Neurological: Positive for weakness and headaches. Hematological: Negative. Psychiatric/Behavioral: Negative. All other systems reviewed and are negative. A complete review of systems was performed and is negative except as noted above in the HPI.        PAST MEDICAL HISTORY     Past Medical History:   Diagnosis Date    Arthritis     Fernandes's esophagus     DDD (degenerative [Mother] : mother disc disease), lumbar     DM [facility]di[facility] mellitus[facility] 2 [facility]     Duodenal ulcer     E. coli sepsis ([facility] 02/2014    Elevated lipids     Elevated pancreatic enzyme     Gastritis     GERD (gastroesophageal reflux disease)     Hiatal hernia     History of colon polyps     HTN (hypertension)     Hypercholesterolemia     Hypothyroidism     Nausea and vomiting     Osteoarthritis     other     Non Specified Ulcers    Pernicious anemia     Skin cancer     History OF.  Venous angioma     left-parietal         SURGICAL HISTORY       Past Surgical History:   Procedure Laterality Date    BREAST SURGERY      BG Biopsy    CHOLECYSTECTOMY      COLONOSCOPY  3/16/2005    DR. OTERO    COLONOSCOPY  5/21/2010    tubular adenoma    COLONOSCOPY  12/31/2015    Dr Lauren Bhardwaj, normal, 5 yr recall    FRACTURE SURGERY      x 2 right ankle    HAND SURGERY Right 07/2012    Dr Jennifer Aggarwal removed due to arthritis and tendon repair    HYSTERECTOMY, TOTAL ABDOMINAL  1969    KNEE SURGERY Right     LUMBAR [address] SURGERY  11/2008    L5-S1-Dr Samm Batres LUMBAR SPINE SURGERY  2005    OTHER SURGICAL HISTORY Right     Basa Cell Lesion Removal-elbow    OTHER SURGICAL HISTORY Right 2002    ORIF ankle    [place] EGD INTRMURAL NEEDLE ASPIR/BIOP ALTERED ANATOMY N/A 8/15/2018    Dr Jordan Grajeda EGD TRANSORAL BIOPSY SINGLE/MULTIPLE N/A 12/6/2017    EGD BIOPSY performed by Ayla Narvaez DO at [address] Right     ROTATOR CUFF REPAIR Left 12/2013    Dr Sudheer Jenkins  5/19/2005    DR. KIM    UPPER GASTROINTESTINAL ENDOSCOPY  10/22/2009    peptic stricture, schatzki ring dilated 46 fr, sm hiatal hernia, gastritis    UPPER GASTROINTESTINAL ENDOSCOPY  10/01/2014    Dr Hauser-empiric dilation, urease neg    UPPER GASTROINTESTINAL ENDOSCOPY N/A 12/18/2015    Dr Lauren Bhardwaj, stricture/ulcers, repeat 8 wks    UPPER GASTROINTESTINAL ENDOSCOPY  12/6/2017     Analisa-w/dilation over wire, 46 [place]-hiatal hernia, distal esophageal stricture    UPPER GASTROINTESTINAL ENDOSCOPY  8/15/2018    Dr SANYA Finley-joey/dilation over wire-51 [facility] and EUS-Possible duodenitis, gastritis, duodenal stricture likely peptic stricture nature, hiatal hernia    UPPER GASTROINTESTINAL ENDOSCOPY  8/15/2018    Dr SANYA Finley-joey/dilation over wire-51 [facility] and EUS-Possible duodenitis, gastritis, duodenal stricture likely peptic stricture nature, hiatal hernia         CURRENT MEDICATIONS       Previous Medications    BUPROPION (WELLBUTRIN XL) 150 MG EXTENDED RELEASE TABLET    Take 150 mg by mouth every morning    CELECOXIB (CELEBREX) 200 MG CAPSULE    Take 200 mg by mouth daily    GLIPIZIDE (GLUCOTROL PO)    Take 10 mg by mouth daily. LEVOTHYROXINE (SYNTHROID) 75 MCG TABLET    Take 75 mcg by mouth Daily. LISINOPRIL PO    Take 10 mg by mouth daily. METFORMIN HCL (GLUCOPHAGE PO)    Take 1,000 mg by mouth 2 times daily.     PANTOPRAZOLE (PROTONIX) 40 MG TABLET    Take 1 tablet by mouth 2 times daily (before meals)    ROSUVASTATIN (CRESTOR) 20 MG TABLET    Take 20 mg by mouth daily    SUCRALFATE (CARAFATE) 1 GM TABLET    Take 1 tablet by mouth 4 times daily       ALLERGIES     Vancomycin and Januvia [sitagliptin]    FAMILY HISTORY       Family History   Problem Relation Age of Onset    Breast Cancer Mother     Colon Cancer Neg Hx     Colon Polyps Neg Hx     Esophageal Cancer Neg Hx     Liver Disease Neg Hx     Liver Cancer Neg Hx     Rectal Cancer Neg Hx     Stomach Cancer Neg Hx           SOCIAL HISTORY       Social History     Socioeconomic History    Marital status:      Spouse name: None    Number of children: None    Years of education: None    Highest education level: None   Occupational History    None   Tobacco Use    Smoking status: Former Smoker     Packs/day: 0.50     Years: 10.00     Pack years: 5.00     Types: Cigarettes     Quit date: 9/16/1989     Years since quittin.8    Smokeless tobacco: Never Used   Vaping Use    Vaping Use: Never used   Substance and Sexual Activity    Alcohol use: Yes     Comment: glass of wine a couple times per month    Drug use: No    Sexual activity: None   Other Topics Concern    None   Social History Narrative    None     Social Determinants of Health     Financial Resource Strain:     Difficulty of Paying Living Expenses:    Food Insecurity:     Worried About Running Out of Food in the Last Year:     Ran Out of Food in the Last Year:    Transportation Needs:     Lack of Transportation (Medical):  Lack of Transportation (Non-Medical):    Physical Activity:     Days of Exercise per Week:     Minutes of Exercise per Session:    Stress:     Feeling of Stress :    Social Connections:     Frequency of Communication with Friends and Family:     Frequency of Social Gatherings with Friends and Family:     Attends Hindu Services:     Active Member of Clubs or Organizations:     Attends Club or Organization Meetings:     Marital Status:    Intimate Partner Violence:     Fear of Current or Ex-Partner:     Emotionally Abused:     Physically Abused:     Sexually Abused:        SCREENINGS             PHYSICAL EXAM    (up to 7 for level 4, 8 or more for level 5)     ED Triage Vitals [21 0913]   BP Temp Temp src Pulse Resp SpO2 Height Weight   (!) 170/95 -- -- 68 18 95 % -- --       Physical Exam  Vitals and nursing note reviewed. Constitutional:       General: She is in acute distress. Appearance: She is well-developed. She is ill-appearing and diaphoretic. HENT:      Head: Normocephalic and atraumatic. Eyes:      General: No scleral icterus. Neck:      Vascular: No JVD. Cardiovascular:      Rate and Rhythm: Normal rate and regular rhythm. Pulses:           Radial pulses are 2+ on the right side and 2+ on the left side.         Dorsalis pedis pulses are 2+ on the right side and 2+ on the left side.      Heart sounds: Normal heart sounds. No murmur heard. No friction rub. No gallop. Pulmonary:      Effort: Pulmonary effort is normal. No accessory muscle usage or respiratory distress. Breath sounds: Normal breath sounds. No stridor. No decreased breath sounds, wheezing, rhonchi or rales. Chest:      Chest wall: No tenderness. Abdominal:      General: There is no distension. Palpations: Abdomen is soft. Tenderness: There is no abdominal tenderness. There is no guarding or rebound. Musculoskeletal:         General: No deformity. Normal range of motion. Right lower leg: No edema. Left lower leg: No edema. Skin:     General: Skin is warm. Coloration: Skin is pale. Findings: No erythema. Neurological:      Mental Status: She is alert and oriented to person, place, and time. GCS: GCS eye subscore is 4. GCS verbal subscore is 5. GCS motor subscore is 6. Cranial Nerves: No cranial nerve deficit. Motor: No abnormal muscle tone.       Coordination: Coordination normal.   Psychiatric:         Behavior: Behavior normal.         Judgment: Judgment normal.         DIAGNOSTIC RESULTS     EKG: All EKG's are interpreted by the Emergency Department Physician who either signs or Co-signs this chart in the absence of a cardiologist.      RADIOLOGY:   Non-plain film images such as CT, Ultrasound and MRI are read by the radiologist. Marisa Mems images are visualized and preliminarily interpreted by the emergency physician with the below findings:      Interpretation per the Radiologist below, if available at the time of this note:    XR CHEST PORTABLE    (Results Pending)         ED BEDSIDE ULTRASOUND:   Performed by ED Physician - none    LABS:  Labs Reviewed   CBC WITH AUTO DIFFERENTIAL - Abnormal; Notable for the following components:       Result Value    MCHC 31.3 (*)     All other components within normal limits   COMPREHENSIVE METABOLIC PANEL W/ REFLEX service. CONSULTS:  IP CONSULT TO CARDIAC REHAB    PROCEDURES:  Unless otherwise notedbelow, none     Procedures  CRITICAL CARE TIME   Total Critical Care time was 30 minutes, excluding separately reportable procedures. There was a high probability of clinically significant/life threatening deterioration in the patient's condition which required my urgent intervention. FINAL IMPRESSION     1. Acute ST elevation myocardial infarction (STEMI) of inferolateral wall (HCC)          DISPOSITION/PLAN   DISPOSITION        PATIENT REFERRED TO:  No follow-up provider specified.     DISCHARGE MEDICATIONS:  New Prescriptions    No medications on file          (Please note that portions of this note were completed with a voice recognition program.  Efforts were made to edit the dictations butoccasionally words are mis-transcribed.)    Merita Ahumada, MD (electronically signed)  AttendingEmergency Physician          Merita Ahumada., MD  07/25/21 [id number]

## 2024-11-18 NOTE — PATIENT INSTRUCTIONS
Use the lasix only as needed if you wake with shortness of breath or     Take Midodrine 5mg every 8  hours to help with BP support     Will get you scheduled for cardiac MEMs     Maintain good blood pressure control-goal<130/80 at rest  Maintain good cholesterol control LDL goal<70 with arterial disease  If you are diabetic work to keep/obtain hemoglobin A1c< 7    Follow up after procedure.   Call with any questions or concerns  Follow up with Ciara Salomon, DO for non cardiac problems  Report any new problems  Cardiovascular Fitness-Exercise as tolerated.    Cardiac / Healthy Diet- Avoid processed high fat foods, maintain low sodium/salt   Continue current medications as directed  Continue plan of treatment  It is always recommended that you bring your medications bottles with you to each visit - this is for your safety!

## 2024-11-18 NOTE — TELEPHONE ENCOUNTER
----- Message from GAL PARSONS MA sent at 11/18/2024  2:56 PM CST -----  Dr. Weaver agrees for patient to get mems  ----- Message -----  From: Erlinda Lee APRN  Sent: 11/18/2024   1:46 PM CST  To: GAL Edwards Dr. patient    Heart failure with difficulty to control with medications due to intolerance    Discussed cardiac mems at last office visit and would like to proceed  Has had several hospitalizations over this year  Please discuss with Dr. Weaver and get scheduled if he agrees

## 2024-11-26 ENCOUNTER — HOSPITAL ENCOUNTER (OUTPATIENT)
Age: 84
Setting detail: OUTPATIENT SURGERY
Discharge: HOME | End: 2024-11-26
Attending: INTERNAL MEDICINE | Admitting: INTERNAL MEDICINE
Payer: MEDICARE

## 2024-11-26 VITALS
OXYGEN SATURATION: 100 % | TEMPERATURE: 96.9 F | WEIGHT: 165 LBS | HEART RATE: 53 BPM | SYSTOLIC BLOOD PRESSURE: 133 MMHG | DIASTOLIC BLOOD PRESSURE: 42 MMHG | HEIGHT: 64 IN | RESPIRATION RATE: 11 BRPM | BODY MASS INDEX: 28.17 KG/M2

## 2024-11-26 DIAGNOSIS — I50.9 CONGESTIVE HEART FAILURE, UNSPECIFIED HF CHRONICITY, UNSPECIFIED HEART FAILURE TYPE (HCC): ICD-10-CM

## 2024-11-26 LAB
ANION GAP SERPL CALCULATED.3IONS-SCNC: 11 MMOL/L (ref 7–19)
BUN SERPL-MCNC: 22 MG/DL (ref 8–23)
CALCIUM SERPL-MCNC: 9.7 MG/DL (ref 8.8–10.2)
CHLORIDE SERPL-SCNC: 102 MMOL/L (ref 98–111)
CO2 SERPL-SCNC: 23 MMOL/L (ref 22–29)
CREAT SERPL-MCNC: 1.8 MG/DL (ref 0.5–0.9)
ECHO BSA: 1.84 M2
EKG P AXIS: -18 DEGREES
EKG P-R INTERVAL: 240 MS
EKG Q-T INTERVAL: 412 MS
EKG QRS DURATION: 84 MS
EKG QTC CALCULATION (BAZETT): 398 MS
EKG T AXIS: 94 DEGREES
ERYTHROCYTE [DISTWIDTH] IN BLOOD BY AUTOMATED COUNT: 12.2 % (ref 11.5–14.5)
GLUCOSE SERPL-MCNC: 183 MG/DL (ref 70–99)
HCT VFR BLD AUTO: 32.6 % (ref 37–47)
HGB BLD-MCNC: 10.5 G/DL (ref 12–16)
MCH RBC QN AUTO: 27.2 PG (ref 27–31)
MCHC RBC AUTO-ENTMCNC: 32.2 G/DL (ref 33–37)
MCV RBC AUTO: 84.5 FL (ref 81–99)
PLATELET # BLD AUTO: 231 K/UL (ref 130–400)
PMV BLD AUTO: 9.9 FL (ref 9.4–12.3)
POTASSIUM SERPL-SCNC: 4.8 MMOL/L (ref 3.5–5)
RBC # BLD AUTO: 3.86 M/UL (ref 4.2–5.4)
REASON FOR REJECTION: NORMAL
REJECTED TEST: NORMAL
SODIUM SERPL-SCNC: 136 MMOL/L (ref 136–145)
WBC # BLD AUTO: 7.6 K/UL (ref 4.8–10.8)

## 2024-11-26 PROCEDURE — 7100000011 HC PHASE II RECOVERY - ADDTL 15 MIN: Performed by: INTERNAL MEDICINE

## 2024-11-26 PROCEDURE — C1769 GUIDE WIRE: HCPCS | Performed by: INTERNAL MEDICINE

## 2024-11-26 PROCEDURE — 80048 BASIC METABOLIC PNL TOTAL CA: CPT

## 2024-11-26 PROCEDURE — 99152 MOD SED SAME PHYS/QHP 5/>YRS: CPT | Performed by: INTERNAL MEDICINE

## 2024-11-26 PROCEDURE — 2709999900 HC NON-CHARGEABLE SUPPLY: Performed by: INTERNAL MEDICINE

## 2024-11-26 PROCEDURE — 85027 COMPLETE CBC AUTOMATED: CPT

## 2024-11-26 PROCEDURE — 6360000004 HC RX CONTRAST MEDICATION: Performed by: INTERNAL MEDICINE

## 2024-11-26 PROCEDURE — 2580000003 HC RX 258: Performed by: INTERNAL MEDICINE

## 2024-11-26 PROCEDURE — C2624 WIRELESS PRESSURE SENSOR: HCPCS | Performed by: INTERNAL MEDICINE

## 2024-11-26 PROCEDURE — 93005 ELECTROCARDIOGRAM TRACING: CPT

## 2024-11-26 PROCEDURE — 99153 MOD SED SAME PHYS/QHP EA: CPT | Performed by: INTERNAL MEDICINE

## 2024-11-26 PROCEDURE — 6360000002 HC RX W HCPCS: Performed by: INTERNAL MEDICINE

## 2024-11-26 PROCEDURE — 6370000000 HC RX 637 (ALT 250 FOR IP): Performed by: INTERNAL MEDICINE

## 2024-11-26 PROCEDURE — C1894 INTRO/SHEATH, NON-LASER: HCPCS | Performed by: INTERNAL MEDICINE

## 2024-11-26 PROCEDURE — 36415 COLL VENOUS BLD VENIPUNCTURE: CPT

## 2024-11-26 PROCEDURE — 7100000010 HC PHASE II RECOVERY - FIRST 15 MIN: Performed by: INTERNAL MEDICINE

## 2024-11-26 PROCEDURE — C1725 CATH, TRANSLUMIN NON-LASER: HCPCS | Performed by: INTERNAL MEDICINE

## 2024-11-26 PROCEDURE — 33289 TCAT IMPL WRLS P-ART PRS SNR: CPT | Performed by: INTERNAL MEDICINE

## 2024-11-26 RX ORDER — MIDAZOLAM HYDROCHLORIDE 1 MG/ML
INJECTION, SOLUTION INTRAMUSCULAR; INTRAVENOUS PRN
Status: DISCONTINUED | OUTPATIENT
Start: 2024-11-26 | End: 2024-11-26 | Stop reason: HOSPADM

## 2024-11-26 RX ORDER — IODIXANOL 320 MG/ML
INJECTION, SOLUTION INTRAVASCULAR PRN
Status: DISCONTINUED | OUTPATIENT
Start: 2024-11-26 | End: 2024-11-26 | Stop reason: HOSPADM

## 2024-11-26 RX ORDER — FENTANYL CITRATE 50 UG/ML
INJECTION, SOLUTION INTRAMUSCULAR; INTRAVENOUS PRN
Status: DISCONTINUED | OUTPATIENT
Start: 2024-11-26 | End: 2024-11-26 | Stop reason: HOSPADM

## 2024-11-26 RX ORDER — SODIUM CHLORIDE 0.9 % (FLUSH) 0.9 %
5-40 SYRINGE (ML) INJECTION EVERY 12 HOURS SCHEDULED
Status: DISCONTINUED | OUTPATIENT
Start: 2024-11-26 | End: 2024-11-26 | Stop reason: HOSPADM

## 2024-11-26 RX ORDER — ONDANSETRON 2 MG/ML
4 INJECTION INTRAMUSCULAR; INTRAVENOUS EVERY 6 HOURS PRN
Status: DISCONTINUED | OUTPATIENT
Start: 2024-11-26 | End: 2024-11-26 | Stop reason: HOSPADM

## 2024-11-26 RX ORDER — SODIUM CHLORIDE 9 MG/ML
INJECTION, SOLUTION INTRAVENOUS PRN
Status: DISCONTINUED | OUTPATIENT
Start: 2024-11-26 | End: 2024-11-26 | Stop reason: HOSPADM

## 2024-11-26 RX ORDER — ASPIRIN 81 MG/1
81 TABLET ORAL ONCE
Status: DISCONTINUED | OUTPATIENT
Start: 2024-11-26 | End: 2024-11-26 | Stop reason: HOSPADM

## 2024-11-26 RX ORDER — SODIUM CHLORIDE 0.9 % (FLUSH) 0.9 %
5-40 SYRINGE (ML) INJECTION PRN
Status: DISCONTINUED | OUTPATIENT
Start: 2024-11-26 | End: 2024-11-26 | Stop reason: HOSPADM

## 2024-11-26 RX ORDER — CLOPIDOGREL BISULFATE 75 MG/1
TABLET ORAL PRN
Status: DISCONTINUED | OUTPATIENT
Start: 2024-11-26 | End: 2024-11-26 | Stop reason: HOSPADM

## 2024-11-26 RX ORDER — CLOPIDOGREL BISULFATE 75 MG/1
75 TABLET ORAL DAILY
Qty: 30 TABLET | Refills: 0 | Status: SHIPPED | OUTPATIENT
Start: 2024-11-27

## 2024-11-26 RX ORDER — ACETAMINOPHEN 325 MG/1
650 TABLET ORAL EVERY 4 HOURS PRN
Status: DISCONTINUED | OUTPATIENT
Start: 2024-11-26 | End: 2024-11-26 | Stop reason: HOSPADM

## 2024-11-26 RX ORDER — SODIUM CHLORIDE 9 MG/ML
INJECTION, SOLUTION INTRAVENOUS CONTINUOUS
Status: DISCONTINUED | OUTPATIENT
Start: 2024-11-26 | End: 2024-11-26 | Stop reason: HOSPADM

## 2024-11-26 RX ADMIN — SODIUM CHLORIDE: 9 INJECTION, SOLUTION INTRAVENOUS at 08:03

## 2024-11-26 NOTE — PROGRESS NOTES
Returned post cardiac monitor device implant.  Awake and alert.  Puncture site to right groin clear with gauze and tegaderm dressing in place.  Denies any c/o pian.   at bedside.

## 2024-11-26 NOTE — H&P
(diabetes mellitus), type 2 (HCC)      Duodenal ulcer      E. coli sepsis (HCC) 02/2014    Elevated pancreatic enzyme 2017    Gastritis      GERD (gastroesophageal reflux disease)      Hiatal hernia      History of colon polyps      HTN (hypertension)      Hypercholesterolemia      Hypothyroidism      Osteoarthritis      other       Non Specified Ulcers    Palliative care patient 04/06/2023    Pernicious anemia      Skin cancer       History OF.     Venous angioma       left-parietal         Past Surgical History         Past Surgical History:   Procedure Laterality Date    BREAST SURGERY         BG Biopsy    CHOLECYSTECTOMY, OPEN        COLONOSCOPY   03/16/2005     DR. OTERO    COLONOSCOPY   05/21/2010     tubular adenoma    COLONOSCOPY   12/31/2015     Dr Hauser, normal, 5 yr recall    FRACTURE SURGERY         2002 performed and 2003 pins removed, then had a replacement in 2018    HAND SURG[facility] Right 07[facility]2012     [facility] to arthritis and tendon repair    HYSTERECTOMY, TOTAL ABDOMINAL (CERVIX REMOVED)   1969    KNEE SURGERY Right 1983     Laprascopic    LUMBAR DISC SURGERY   11/2008     L5-S1-Dr Diaz    LUMBAR SPINE SURGERY   2005    OTHER SURGICAL HISTORY Right       Basa Cell Lesion Removal-elbow    OTHER SURGICAL HISTORY Right 2002     ORIF ankle    [place] EGD INTRMURAL NEEDLE ASPIR/BIOP ALTERED ANATOMY N/A 08/15/2018     Dr SANYA Finley-EUS    [place] EGD TRANSORAL BIOPSY SINGLE/MULTIPLE N/A 12/06/2017     EGD BIOPSY performed by Dane Hausre,  at [facility] OR    ROTATOR CUFF REPAIR Right      ROTATOR CUFF REPAIR Left 12/2013     Dr Murray    UPPER GASTROINTESTINAL ENDOSCOPY   05/19/2005     DR. KIM    UPPER GASTROINTESTINAL ENDOSCOPY   10/22/2009     peptic stricture, schatzki ring dilated 51 fr, sm hiatal hernia, gastritis    UPPER GASTROINTESTINAL ENDOSCOPY   10/01/2014     Dr Hauser-empiric dilation, urease neg    UPPER GASTROINTESTINAL ENDOSCOPY N/A 12/18/2015     Dr Hauser,

## 2024-11-26 NOTE — PROGRESS NOTES
Figure 8 suture removed from right groin site with no sign of any bleeding or hematoma noted.  Site cleansed with Chloroprep swab then gauze and tegaderm dressing applied.

## 2024-11-26 NOTE — PROGRESS NOTES
Patient and  instructed on care of  right groin site post cardiac monitor insertion.  Given written instructions.  Both stated understanding.

## 2024-12-06 ENCOUNTER — TELEPHONE (OUTPATIENT)
Dept: CARDIOLOGY CLINIC | Age: 84
End: 2024-12-06

## 2024-12-09 NOTE — TELEPHONE ENCOUNTER
Patient returned call and left a voice message stating she has trouble getting a pillow reading over the weekend and she requested assistance.  Returned her call and left a voice message.  There is a technical service help number in the quick start guide she can call for assistance.

## 2024-12-10 NOTE — TELEPHONE ENCOUNTER
Spoke to patient and let her know to I spoke to the rep and she stated to contact tech services and the number is located on the back of the pillow.

## 2024-12-20 DIAGNOSIS — I50.9 CONGESTIVE HEART FAILURE, UNSPECIFIED HF CHRONICITY, UNSPECIFIED HEART FAILURE TYPE (HCC): ICD-10-CM

## 2024-12-20 DIAGNOSIS — Z95.818 PRESENCE OF CARDIOMEMS HF SYSTEM: Primary | ICD-10-CM

## 2024-12-23 ENCOUNTER — OFFICE VISIT (OUTPATIENT)
Dept: CARDIOLOGY CLINIC | Age: 84
End: 2024-12-23
Payer: MEDICARE

## 2024-12-23 VITALS
WEIGHT: 169 LBS | BODY MASS INDEX: 28.85 KG/M2 | HEART RATE: 77 BPM | DIASTOLIC BLOOD PRESSURE: 68 MMHG | HEIGHT: 64 IN | SYSTOLIC BLOOD PRESSURE: 134 MMHG

## 2024-12-23 DIAGNOSIS — I25.10 CORONARY ARTERY DISEASE INVOLVING NATIVE CORONARY ARTERY OF NATIVE HEART WITHOUT ANGINA PECTORIS: Primary | ICD-10-CM

## 2024-12-23 DIAGNOSIS — R06.02 SHORTNESS OF BREATH: ICD-10-CM

## 2024-12-23 DIAGNOSIS — Z95.5 H/O HEART ARTERY STENT: ICD-10-CM

## 2024-12-23 DIAGNOSIS — I10 ESSENTIAL HYPERTENSION: ICD-10-CM

## 2024-12-23 DIAGNOSIS — I50.22 CHRONIC SYSTOLIC CONGESTIVE HEART FAILURE (HCC): ICD-10-CM

## 2024-12-23 DIAGNOSIS — E78.5 DYSLIPIDEMIA: ICD-10-CM

## 2024-12-23 PROCEDURE — 1159F MED LIST DOCD IN RCRD: CPT | Performed by: INTERNAL MEDICINE

## 2024-12-23 PROCEDURE — 3078F DIAST BP <80 MM HG: CPT | Performed by: INTERNAL MEDICINE

## 2024-12-23 PROCEDURE — 3075F SYST BP GE 130 - 139MM HG: CPT | Performed by: INTERNAL MEDICINE

## 2024-12-23 PROCEDURE — 99213 OFFICE O/P EST LOW 20 MIN: CPT | Performed by: INTERNAL MEDICINE

## 2024-12-23 PROCEDURE — 1123F ACP DISCUSS/DSCN MKR DOCD: CPT | Performed by: INTERNAL MEDICINE

## 2024-12-23 ASSESSMENT — ENCOUNTER SYMPTOMS
VOMITING: 0
EYES NEGATIVE: 1
NAUSEA: 0
RESPIRATORY NEGATIVE: 1
GASTROINTESTINAL NEGATIVE: 1
SHORTNESS OF BREATH: 0
DIARRHEA: 0

## 2024-12-23 NOTE — PROGRESS NOTES
steps to enter the home, has no stairs inside the home, lives with her  and their daughter, has a dog     Social Determinants of Health     Financial Resource Strain: Not on file   Food Insecurity: No Food Insecurity (7/17/2024)    Hunger Vital Sign     Worried About Running Out of Food in the Last Year: Never true     Ran Out of Food in the Last Year: Never true   Transportation Needs: No Transportation Needs (7/17/2024)    PRAPARE - Transportation     Lack of Transportation (Medical): No     Lack of Transportation (Non-Medical): No   Physical Activity: Not on file   Stress: Not on file   Social Connections: Unknown (10/10/2023)    Received from [facility]    Family and Community Support     Help with Day-to-Day Activities: Not on file     Lonely or Isolated: Not on file   Intimate Partner Violence: Unknown (10/10/2023)    Received from [facility]    Abuse Screen     Unsafe at Home or Work/School: Not on file     Feels Threatened by Someone?: Not on file     Does Anyone Keep You from Contacting Others or Doint Things Outside the Home?: Not on file     Physical Sign of Abuse Present: Not on file   Housing Stability: Low Risk  (7/17/2024)    Housing Stability Vital Sign     Unable to Pay for Housing in the Last Year: No     Number of Places Lived in the Last Year: 1     Unstable Housing in the Last Year: No       Physical Examination:  /68   Pulse 77   Ht 1.626 m (5' 4\")   Wt 76.7 kg (169 lb)   BMI 29.01 kg/m²   Physical Exam  Vitals reviewed.   Constitutional:       Appearance: She is well-developed.   Neck:      Vascular: No carotid bruit or JVD.   Cardiovascular:      Rate and Rhythm: Normal rate and regular rhythm.      Heart sounds: Normal heart sounds. No murmur heard.     No friction rub. No gallop.   Pulmonary:      Effort: Pulmonary effort is normal. No respiratory distress.      Breath sounds: Normal breath

## 2024-12-30 DIAGNOSIS — Z95.818 PRESENCE OF CARDIOMEMS HF SYSTEM: Primary | ICD-10-CM

## 2024-12-30 DIAGNOSIS — I50.22 CHRONIC SYSTOLIC CONGESTIVE HEART FAILURE (HCC): ICD-10-CM

## 2025-01-07 ENCOUNTER — TELEPHONE (OUTPATIENT)
Dept: CARDIOLOGY CLINIC | Age: 85
End: 2025-01-07

## 2025-01-07 NOTE — TELEPHONE ENCOUNTER
Patient returned call.  She stated she is doing about the same as usual for her.  She stated she has good days and bad days.  She is not any worse than normal for her.  Reviewed with her some inaccurate readings on her cardiomems device.  She stated yesterday she got a positioning error and she repositioned and then got it to read.  Informed her I contacted technical services on 12/30/24 and they wanted to monitor the readings for a few more weeks and they will call us back with recommendations about the inaccurate readings.

## 2025-01-20 ENCOUNTER — TELEPHONE (OUTPATIENT)
Dept: CARDIOLOGY CLINIC | Age: 85
End: 2025-01-20

## 2025-01-23 NOTE — TELEPHONE ENCOUNTER
Spoke to patient.  Instructions given for her to call cardiomems technical services.  She has some type of electrical interference on her device per rep.  Tech services can work with patient to try to identify/resolve the issue.

## 2025-03-06 ENCOUNTER — TELEPHONE (OUTPATIENT)
Dept: CARDIOLOGY CLINIC | Age: 85
End: 2025-03-06

## 2025-03-06 NOTE — TELEPHONE ENCOUNTER
Received message from cardiomems rep relaying the information from techinical services.  This waveform continues to be dampened and pressures cannot be used reliably for treatment decision making.  The team recommends clinical correlation to determine if there is a cause leading to impaired blood flow to the sensor (ie: thrombus).  It does not seem likely that this will resolve leading to a physiologic waveform.  There is an option to place an additional sensor on the other side if the clinic wants to continue to monitor her hemodynamics.      Reviewed with MARTY Calles who referred patient for the cardiomems.  Dr. Weaver is out of town this week.  Notified patient.  Patient is interested in getting a new sensor place, but she would like to have the CT scan done to evaluate the current sensor.  Will find out from device rep what CT scan to they recommended and relay the information to provider.

## 2025-03-11 ENCOUNTER — TELEPHONE (OUTPATIENT)
Dept: CARDIOLOGY CLINIC | Age: 85
End: 2025-03-11

## 2025-03-11 DIAGNOSIS — T81.507A: ICD-10-CM

## 2025-03-11 DIAGNOSIS — I50.9 CONGESTIVE HEART FAILURE, UNSPECIFIED HF CHRONICITY, UNSPECIFIED HEART FAILURE TYPE (HCC): Primary | ICD-10-CM

## 2025-03-11 NOTE — TELEPHONE ENCOUNTER
Left message for patient CTA has been ordered.  She will need to call [phone] to schedule.  Requested she call us back and let us know when it gets scheduled.

## 2025-03-11 NOTE — PROGRESS NOTES
CTA of chest to evaluate placement of CardioMEMS device placed.  Patient can call and get scheduled

## 2025-03-13 NOTE — TELEPHONE ENCOUNTER
Called patient.  She stated she did not receive the message left for her regarding order for CTA.  Gave her the number to call and schedule CTA.  Patient wanted to know if her insurance would cover a second device.  Told her will reach out to the rep and find out if her cardiomems is under warranty.  Sent message to device rep.  Awaiting response.  Patient returned call and stated she is schedule for the CTA on 3/17/25 at 2:30 pm.

## 2025-03-17 ENCOUNTER — HOSPITAL ENCOUNTER (OUTPATIENT)
Dept: CT IMAGING | Age: 85
Discharge: HOME | End: 2025-03-17
Payer: MEDICARE

## 2025-03-17 ENCOUNTER — TELEPHONE (OUTPATIENT)
Dept: CARDIOLOGY CLINIC | Age: 85
End: 2025-03-17

## 2025-03-17 DIAGNOSIS — I50.9 CONGESTIVE HEART FAILURE, UNSPECIFIED HF CHRONICITY, UNSPECIFIED HEART FAILURE TYPE (HCC): ICD-10-CM

## 2025-03-17 DIAGNOSIS — T81.507A: ICD-10-CM

## 2025-03-17 LAB
CREAT SERPL-MCNC: 1.7 MG/DL (ref 0.3–1.3)
PERFORMED ON: ABNORMAL

## 2025-03-17 PROCEDURE — 71275 CT ANGIOGRAPHY CHEST: CPT

## 2025-03-17 PROCEDURE — 6360000004 HC RX CONTRAST MEDICATION: Performed by: CLINICAL NURSE SPECIALIST

## 2025-03-17 PROCEDURE — 82565 ASSAY OF CREATININE: CPT

## 2025-03-17 RX ORDER — IOPAMIDOL 755 MG/ML
75 INJECTION, SOLUTION INTRAVASCULAR
Status: COMPLETED | OUTPATIENT
Start: 2025-03-17 | End: 2025-03-17

## 2025-03-17 RX ADMIN — IOPAMIDOL 75 ML: 755 INJECTION, SOLUTION INTRAVENOUS at 15:30

## 2025-03-17 NOTE — TELEPHONE ENCOUNTER
Anca with CT called stating patient is there for CTA with Cre 1.72 and GFR 28 and they need to know how to proceed since dye is used. Per Dr. Weaver ok to proceed just make sure patient is hydrated. Anca notified and voiced understanding.

## 2025-03-19 ENCOUNTER — RESULTS FOLLOW-UP (OUTPATIENT)
Dept: CT IMAGING | Age: 85
End: 2025-03-19

## 2025-03-25 ENCOUNTER — TELEPHONE (OUTPATIENT)
Dept: CARDIOLOGY CLINIC | Age: 85
End: 2025-03-25

## 2025-03-31 ENCOUNTER — TELEPHONE (OUTPATIENT)
Dept: CARDIOLOGY CLINIC | Age: 85
End: 2025-03-31

## 2025-03-31 NOTE — TELEPHONE ENCOUNTER
Received message from [employer] rep stating clinical team reviewed CTA results and stated it is likely the sensor migrated shortly after implant to a smaller vessel which is why the waveform is dampened.  The cardiomems cannot be repositioned, so the only option would be to place another sensor on the other side.  Patient has appointment on Wednesday to see if we can get a good reading in the office and to discuss her options.

## 2025-04-02 ENCOUNTER — OFFICE VISIT (OUTPATIENT)
Dept: CARDIOLOGY CLINIC | Age: 85
End: 2025-04-02

## 2025-04-02 DIAGNOSIS — I50.22 CHRONIC SYSTOLIC CONGESTIVE HEART FAILURE (HCC): Primary | ICD-10-CM

## 2025-04-02 NOTE — RESULT ENCOUNTER NOTE
Appears that the cardio mems device has migrated.  Came into office today for an office check.  Waveforms still dampened.  Patient does not wish to proceed with replacement

## 2025-06-30 ENCOUNTER — OFFICE VISIT (OUTPATIENT)
Dept: CARDIOLOGY CLINIC | Age: 85
End: 2025-06-30
Payer: MEDICARE

## 2025-06-30 VITALS
BODY MASS INDEX: 29.02 KG/M2 | WEIGHT: 170 LBS | SYSTOLIC BLOOD PRESSURE: 124 MMHG | HEART RATE: 65 BPM | HEIGHT: 64 IN | DIASTOLIC BLOOD PRESSURE: 78 MMHG

## 2025-06-30 DIAGNOSIS — I25.10 CORONARY ARTERY DISEASE INVOLVING NATIVE CORONARY ARTERY OF NATIVE HEART WITHOUT ANGINA PECTORIS: ICD-10-CM

## 2025-06-30 DIAGNOSIS — R06.02 SHORTNESS OF BREATH: ICD-10-CM

## 2025-06-30 DIAGNOSIS — Z95.5 H/O HEART ARTERY STENT: ICD-10-CM

## 2025-06-30 DIAGNOSIS — I10 ESSENTIAL HYPERTENSION: ICD-10-CM

## 2025-06-30 DIAGNOSIS — I50.22 CHRONIC SYSTOLIC CONGESTIVE HEART FAILURE (HCC): Primary | ICD-10-CM

## 2025-06-30 DIAGNOSIS — R07.89 OTHER CHEST PAIN: ICD-10-CM

## 2025-06-30 DIAGNOSIS — E78.5 DYSLIPIDEMIA: ICD-10-CM

## 2025-06-30 PROCEDURE — 1123F ACP DISCUSS/DSCN MKR DOCD: CPT | Performed by: INTERNAL MEDICINE

## 2025-06-30 PROCEDURE — 3078F DIAST BP <80 MM HG: CPT | Performed by: INTERNAL MEDICINE

## 2025-06-30 PROCEDURE — 99213 OFFICE O/P EST LOW 20 MIN: CPT | Performed by: INTERNAL MEDICINE

## 2025-06-30 PROCEDURE — 3074F SYST BP LT 130 MM HG: CPT | Performed by: INTERNAL MEDICINE

## 2025-06-30 PROCEDURE — 1159F MED LIST DOCD IN RCRD: CPT | Performed by: INTERNAL MEDICINE

## 2025-06-30 ASSESSMENT — ENCOUNTER SYMPTOMS
RESPIRATORY NEGATIVE: 1
VOMITING: 0
NAUSEA: 0
GASTROINTESTINAL NEGATIVE: 1
EYES NEGATIVE: 1
SHORTNESS OF BREATH: 0
DIARRHEA: 0

## 2025-06-30 NOTE — PROGRESS NOTES
[facility]                            Date:  6/30/2025  Patient: Oveda J Severns  Age:  84 y.o., 1940      Reason for evaluation:         SUBJECTIVE:    Returns today follow-up assessment chronic congestive heart failure coronary artery disease hypertension hyperlipidemia shortness of breath previous stent overall doing reasonably well denies exertional chest discomfort.  Dyspnea is unchanged since her last visit.  Has been reportedly doing fairly well with his lung cancer no other complaints or issues reported blood pressure 124/78 height 65    Review of Systems   Constitutional: Negative.  Negative for chills, fever and unexpected weight change.   HENT: Negative.     Eyes: Negative.    Respiratory: Negative.  Negative for shortness of breath.    Cardiovascular: Negative.  Negative for chest pain.   Gastrointestinal: Negative.  Negative for diarrhea, nausea and vomiting.   Endocrine: Negative.    Genitourinary: Negative.    Musculoskeletal: Negative.    Skin: Negative.    Neurological: Negative.    All other systems reviewed and are negative.        OBJECTIVE:    /78   Pulse 65   Ht 1.626 m (5' 4\")   Wt 77.1 kg (170 lb)   BMI 29.18 kg/m²     Labs:   CBC: No results for input(s): \"WBC\", \"HGB\", \"HCT\", \"PLT\" in the last 72 hours.  BMP:No results for input(s): \"NA\", \"K\", \"CO2\", \"BUN\", \"CREATININE\", \"LABGLOM\", \"GLUCOSE\" in the last 72 hours.  BNP: No results for input(s): \"BNP\" in the last 72 hours.  PT/INR: No results for input(s): \"PROTIME\", \"INR\" in the last 72 hours.  APTT:No results for input(s): \"APTT\" in the last 72 hours.  CARDIAC ENZYMES:No results for input(s): \"CKTOTAL\", \"CKMB\", \"CKMBINDEX\", \"TROPONINI\" in the last 72 hours.  FASTING LIPID PANEL:  Lab Results   Component Value Date/Time    HDL 41 07/18/2024 02:16 AM    TRIG 168 07/18/2024 02:16 AM     LIVER PROFILE:No results for input(s): \"AST\", \"ALT\", \"LABALBU\" in the last 72 hours.        Past Medical History:

## 2025-08-14 ENCOUNTER — TRANSCRIBE ORDERS (OUTPATIENT)
Dept: ADMINISTRATIVE | Age: 85
End: 2025-08-14

## 2025-08-14 DIAGNOSIS — Z12.31 ENCOUNTER FOR SCREENING MAMMOGRAM FOR MALIGNANT NEOPLASM OF BREAST: Primary | ICD-10-CM

## 2025-08-21 ENCOUNTER — HOSPITAL ENCOUNTER (OUTPATIENT)
Dept: WOMENS IMAGING | Age: 85
Discharge: HOME | End: 2025-08-21
Payer: MEDICARE

## 2025-08-21 DIAGNOSIS — Z12.31 ENCOUNTER FOR SCREENING MAMMOGRAM FOR MALIGNANT NEOPLASM OF BREAST: ICD-10-CM

## 2025-08-21 PROCEDURE — 77067 SCR MAMMO BI INCL CAD: CPT

## (undated) DEVICE — FORCEPS BX L240CM JAW DIA2.4MM ORNG L CAP W/ NDL DISP RAD

## (undated) DEVICE — FORCEPS BX L240CM DIA2.4MM L NDL RAD JAW 4 133340

## (undated) DEVICE — SAVARY-GILLIARD WIRE GUIDE: Brand: SAVARY-GILLIARD

## (undated) DEVICE — ENDO KIT,LOURDES HOSPITAL: Brand: MEDLINE INDUSTRIES, INC.